# Patient Record
Sex: FEMALE | Race: WHITE | HISPANIC OR LATINO | Employment: UNEMPLOYED | URBAN - METROPOLITAN AREA
[De-identification: names, ages, dates, MRNs, and addresses within clinical notes are randomized per-mention and may not be internally consistent; named-entity substitution may affect disease eponyms.]

---

## 2017-01-18 ENCOUNTER — OFFICE VISIT (OUTPATIENT)
Dept: OBSTETRICS AND GYNECOLOGY | Facility: CLINIC | Age: 32
End: 2017-01-18
Payer: MEDICAID

## 2017-01-18 VITALS
SYSTOLIC BLOOD PRESSURE: 118 MMHG | DIASTOLIC BLOOD PRESSURE: 78 MMHG | WEIGHT: 150.38 LBS | BODY MASS INDEX: 29.52 KG/M2 | HEIGHT: 60 IN

## 2017-01-18 DIAGNOSIS — E28.2 PCOS (POLYCYSTIC OVARIAN SYNDROME): Primary | ICD-10-CM

## 2017-01-18 DIAGNOSIS — N80.9 ENDOMETRIOSIS: ICD-10-CM

## 2017-01-18 DIAGNOSIS — N97.9 FEMALE FERTILITY PROBLEM: ICD-10-CM

## 2017-01-18 DIAGNOSIS — D25.9 UTERINE LEIOMYOMA, UNSPECIFIED LOCATION: ICD-10-CM

## 2017-01-18 PROCEDURE — 99999 PR PBB SHADOW E&M-EST. PATIENT-LVL III: CPT | Mod: PBBFAC,,, | Performed by: OBSTETRICS & GYNECOLOGY

## 2017-01-18 PROCEDURE — 99212 OFFICE O/P EST SF 10 MIN: CPT | Mod: S$PBB,,, | Performed by: OBSTETRICS & GYNECOLOGY

## 2017-01-18 PROCEDURE — 99213 OFFICE O/P EST LOW 20 MIN: CPT | Mod: PBBFAC | Performed by: OBSTETRICS & GYNECOLOGY

## 2017-01-18 RX ORDER — CLOMIPHENE CITRATE 50 MG/1
50 TABLET ORAL DAILY
Qty: 5 TABLET | Refills: 2 | Status: SHIPPED | OUTPATIENT
Start: 2017-01-18 | End: 2017-01-23

## 2017-01-18 NOTE — MR AVS SNAPSHOT
[facility] [place] [facility]  [address], [address]  [place] [address]  Phone: [phone]                  Amara Dasilva   2017 1:30 PM   Office Visit    Description:  Female : 1985   Provider:  Carlos Alberto Miles MD   Department:  [facility] [place] [facility]           Reason for Visit     Results           Diagnoses this Visit        Comments    PCOS (polycystic ovarian syndrome)    -  Primary     Female fertility problem         Uterine leiomyoma, unspecified location         Endometriosis                To Do List           Goals (5 Years of Data)     None      Follow-Up and Disposition     Return in about 6 weeks (around 3/1/2017).       These Medications        Disp Refills Start End    clomiPHENE (CLOMID) 50 mg tablet 5 tablet 2 2017    Take 1 tablet (50 mg total) by mouth once daily. - Oral    [facility] [id number] [place] [address] [address] [place] [address] Ph #: [phone]         [facility] On Call     [facility] On Call Nurse Care Line -  Assistance  Registered nurses in the [facility] On Call Center provide clinical advisement, health education, appointment booking, and other advisory services.  Call for this free service at [phone].             Medications           Message regarding Medications     Verify the changes and/or additions to your medication regime listed below are the same as discussed with your clinician today.  If any of these changes or additions are incorrect, please notify your healthcare provider.        START taking these NEW medications        Refills    clomiPHENE (CLOMID) 50 mg tablet 2    Sig: Take 1 tablet (50 mg total) by mouth once daily.    Class: Normal    Route: Oral           Verify that the below list of medications is an accurate representation of the medications you are currently taking.  If none reported, the list may be blank. If incorrect, please contact your healthcare  provider. Carry this list with you in case of emergency.           Current Medications     clomiPHENE (CLOMID) 50 mg tablet Take 1 tablet (50 mg total) by mouth once daily.    diphenhydrAMINE (BENADRYL) 25 mg capsule Take 1 each (25 mg total) by mouth every 6 (six) hours as needed for Itching.    doxycycline (MONODOX) 100 MG capsule     fluconazole (DIFLUCAN) 150 MG Tab as needed.    fluticasone (FLONASE) 50 mcg/actuation nasal spray as needed.    hydrocortisone 2.5 % cream daily as needed.    ibuprofen (ADVIL,MOTRIN) 600 MG tablet Take 600 mg by mouth 3 (three) times daily.    ibuprofen (ADVIL,MOTRIN) 800 MG tablet     L norgest&E estradiol-E estrad (SEASONIQUE) 0.15 mg-30 mcg (84)/10 mcg (7) 3MPk Take 1 tablet by mouth once daily.    MICROGESTIN 1/20, 21, 1-20 mg-mcg per tablet     montelukast (SINGULAIR) 10 mg tablet     oxycodone-acetaminophen (PERCOCET)  mg per tablet Take 1 tablet by mouth every 4 (four) hours as needed for Pain.    oxycodone-acetaminophen (PERCOCET) 5-325 mg per tablet Take 1 tablet by mouth every 4 (four) hours as needed for Pain.    polyethylene glycol (GLYCOLAX) 17 gram/dose powder as needed.    PROAIR HFA 90 mcg/actuation inhaler     tizanidine (ZANAFLEX) 4 MG tablet Take 4 mg by mouth every 8 (eight) hours.    tretinoin (RETIN-A) 0.05 % cream            Clinical Reference Information           Vital Signs - Last Recorded  Most recent update: 1/18/2017  1:41 PM by Anna Reardon MA    BP Ht Wt BMI       118/78 5' (1.524 m) 68.2 kg (150 lb 5.7 oz) 29.36 kg/m2       Blood Pressure          Most Recent Value    BP  118/78      Allergies as of 1/18/2017     Hydrocodone    Flagyl [Metronidazole Hcl]      Immunizations Administered on Date of Encounter - 1/18/2017     None

## 2017-01-18 NOTE — PROGRESS NOTES
Subjective:      Chief Complaint:    Chief Complaint   Patient presents with    Results       Menstrual History:    OB History      Para Term  AB TAB SAB Ectopic Multiple Living    2 2 2       2          Menarche age: 13     No LMP recorded. Patient is not currently having periods (Reason: Other).           Objective:        History of Present Illness AND  Examination detailed DICTATE:       HISTORY OF PRESENT ILLNESS:  The patient is 31 years of age,  2, para 2.    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  Also, irregular   cycles, galacturia and also now the patient states that she has problem with the   fertility, trying to get pregnant for several years' duration, is not being   happened.  Ultrasound indicated small fibroid tumors and possible ovaries   compatible with polycystic ovarian syndrome; however, hormone assay essentially   normal limit.  Prolactin is normal.  Fasting insulin normal.  FSH, LH,   testosterone normal.  A very high estradiol level, but multiple follicles   probably explain that.  The patient's last menstrual cycle was on 2017.    Since hormone assays are essentially normal range, my impression is possible   mild form of polycystic ovarian syndrome.  Since the patient is trying to get   pregnant and nothing happened, we will initiate Clomid stimulation at the next   cycle and we will follow up after Clomid stimulation with ultrasound and   progesterone levels to see if the patient is ovulating.  We will send a time   table for the patient to start medication and return for the pelvic ultrasound   and progesterone level.    DIAGNOSIS:  Possible milder form of polycystic ovarian syndrome and infertility,   most likely due to the polycystic ovarian syndrome.      EDOUARD  dd: 2017 13:49:48 (CST)  td: 2017 02:34:53 (CST)  Doc ID   #[id number]  Job ID  #[id number]    CC:           Assessment:      Diagnosis: PCOS   FERTILITY   PROBLEM    FIBROIDS       Plan:      Return in 6  weeks

## 2017-08-23 ENCOUNTER — OFFICE VISIT (OUTPATIENT)
Dept: OBSTETRICS AND GYNECOLOGY | Facility: CLINIC | Age: 32
End: 2017-08-23
Payer: MEDICAID

## 2017-08-23 VITALS
WEIGHT: 148.81 LBS | BODY MASS INDEX: 29.22 KG/M2 | SYSTOLIC BLOOD PRESSURE: 115 MMHG | DIASTOLIC BLOOD PRESSURE: 72 MMHG | HEIGHT: 60 IN

## 2017-08-23 DIAGNOSIS — N92.6 IRREGULAR MENSTRUAL CYCLE: ICD-10-CM

## 2017-08-23 DIAGNOSIS — L70.9 ACNE, UNSPECIFIED ACNE TYPE: ICD-10-CM

## 2017-08-23 DIAGNOSIS — E28.2 PCOS (POLYCYSTIC OVARIAN SYNDROME): Primary | ICD-10-CM

## 2017-08-23 PROCEDURE — 3008F BODY MASS INDEX DOCD: CPT | Mod: ,,, | Performed by: OBSTETRICS & GYNECOLOGY

## 2017-08-23 PROCEDURE — 99213 OFFICE O/P EST LOW 20 MIN: CPT | Mod: PBBFAC | Performed by: OBSTETRICS & GYNECOLOGY

## 2017-08-23 PROCEDURE — 99999 PR PBB SHADOW E&M-EST. PATIENT-LVL III: CPT | Mod: PBBFAC,,, | Performed by: OBSTETRICS & GYNECOLOGY

## 2017-08-23 PROCEDURE — 99212 OFFICE O/P EST SF 10 MIN: CPT | Mod: S$PBB,,, | Performed by: OBSTETRICS & GYNECOLOGY

## 2017-08-23 RX ORDER — DROSPIRENONE AND ETHINYL ESTRADIOL 0.02-3(28)
1 KIT ORAL DAILY
Qty: 30 TABLET | Refills: 11 | Status: SHIPPED | OUTPATIENT
Start: 2017-08-23 | End: 2019-02-07 | Stop reason: CLARIF

## 2017-08-23 NOTE — PROGRESS NOTES
Subjective:      Chief Complaint:    Chief Complaint   Patient presents with    Consult       Menstrual History:    OB History      Para Term  AB Living    2 2 2     2    SAB TAB Ectopic Multiple Live Births                       Menarche age: 13     Patient's last menstrual period was 2017.           Objective:        History of Present Illness AND  Examination detailed DICTATE:         The patient is 32 years of age,  2, para 2.  The patient was seen last   visit in January.  Prior to that, in December, the patient was diagnosed with   polycystic ovaries; however, all hormone assay, everything was in normal range.    The patient had one course of Clomid stimulation without any success.    Reviewing the patient's past history and problem list, endometriosis, ovarian   cyst, pelvic pain, at the present complaining of acne eruption on face.    Surgery, exploratory laparotomy and laser laparoscopy for endometriosis.  As   stated, reviewing the patient's records, only abnormality is the multicystic   ovaries and ultrasound and reversal FSH-LH ratio.    We will repeat the thyroid, TSH and serum testosterone level.  We will start the   patient on Liyah to see if we can control the patient's acne and we will see her   back in three months.  We will start the Liyah with the next menstrual cycle.      EDOUARD  dd: 2017 10:29:58 (CDT)  td: 2017 00:52:48 (CDT)  Doc ID   #[id number]  Job ID #[id number]    CC:         Assessment:      Diagnosis: PCOS    ACNE       Plan:      Return in 3  months

## 2017-12-19 ENCOUNTER — OFFICE VISIT (OUTPATIENT)
Dept: OBSTETRICS AND GYNECOLOGY | Facility: CLINIC | Age: 32
End: 2017-12-19
Payer: MEDICAID

## 2017-12-19 VITALS
DIASTOLIC BLOOD PRESSURE: 73 MMHG | SYSTOLIC BLOOD PRESSURE: 120 MMHG | WEIGHT: 159.63 LBS | BODY MASS INDEX: 31.34 KG/M2 | HEIGHT: 60 IN

## 2017-12-19 DIAGNOSIS — N76.1 CHRONIC VAGINITIS: ICD-10-CM

## 2017-12-19 DIAGNOSIS — R10.2 PELVIC PAIN IN FEMALE: ICD-10-CM

## 2017-12-19 DIAGNOSIS — Z30.9 ENCOUNTER FOR CONTRACEPTIVE MANAGEMENT, UNSPECIFIED TYPE: ICD-10-CM

## 2017-12-19 DIAGNOSIS — N80.9 ENDOMETRIOSIS: ICD-10-CM

## 2017-12-19 DIAGNOSIS — D25.9 UTERINE LEIOMYOMA, UNSPECIFIED LOCATION: ICD-10-CM

## 2017-12-19 DIAGNOSIS — Z00.00 ANNUAL PHYSICAL EXAM: Primary | ICD-10-CM

## 2017-12-19 PROCEDURE — 99999 PR PBB SHADOW E&M-EST. PATIENT-LVL III: CPT | Mod: PBBFAC,,, | Performed by: OBSTETRICS & GYNECOLOGY

## 2017-12-19 PROCEDURE — 99215 OFFICE O/P EST HI 40 MIN: CPT | Mod: S$PBB,,, | Performed by: OBSTETRICS & GYNECOLOGY

## 2017-12-19 PROCEDURE — 87591 N.GONORRHOEAE DNA AMP PROB: CPT

## 2017-12-19 PROCEDURE — 99213 OFFICE O/P EST LOW 20 MIN: CPT | Mod: PBBFAC | Performed by: OBSTETRICS & GYNECOLOGY

## 2017-12-19 PROCEDURE — 87660 TRICHOMONAS VAGIN DIR PROBE: CPT

## 2017-12-19 PROCEDURE — 87480 CANDIDA DNA DIR PROBE: CPT

## 2017-12-19 NOTE — PATIENT INSTRUCTIONS

## 2017-12-19 NOTE — PROGRESS NOTES
Subjective:      Chief Complaint:    Chief Complaint   Patient presents with    Gynecologic Exam       Menstrual History:    OB History      Para Term  AB Living    2 2 2     2    SAB TAB Ectopic Multiple Live Births                       Menarche age: 13     No LMP recorded. Patient is not currently having periods (Reason: Other).            Objective:        History of Present Illness AND  Examination detailed DICTATE:       HISTORY OF PRESENT ILLNESS:  The patient is 32 years of age,  2, para 2,   here for annual yearly examination.  The patient's past medical history is   asthma, endometriosis, ovarian cyst, polycystic ovarian syndrome and fibroid.    Ultrasound last year indicated multiple ovarian cysts with a small fibroid   tumor.  Surgery, exploratory laparotomy and multiple laparoscopies because of   endometriosis.  The patient at the present time complaining of discharge and   pruritus.  Also, pelvic pain off and on, not as bad as it was in the past.  The   patient wishes to continue with the Microgestin.    REVIEW OF SYSTEMS:  HEAD, EAR, EYES, NOSE, AND THROAT:  Negative.  CARDIORESPIRATORY:  Negative.  GASTROINTESTINAL:  Negative.  GENITOURINARY:  See present illness.  NEUROMUSCULAR:  No problem.    PHYSICAL EXAMINATION:  GENERAL:  Well-developed, well-nourished, alert, oriented female, not in acute   distress.  VITAL SIGNS:  Blood pressure 120/73, weight 159.  HEAD:  Normocephalic.  EYES:  Reactive.  NECK:  Supple.  Thyroid is not palpable.  No nodes.  CHEST:  Clear.  HEART:  Regular sinus rhythm, no murmur.  BREASTS:  No lumps, masses, discharge, skin changes, retraction, nipple changes.    Axilla negative.  ABDOMEN:  Upper abdomen normal.  Lower abdomen normal.  No guarding, rebound or   tenderness.  Bowel sounds normal.  PELVIC:  External normal.  Vulva normal.  [place], urethral and [place] glands   are negative.  Vagina, heavy mucoid discharge.  Cervix, mild ectropion.   Uterus,   normal size, shape, position.  Both ovaries are palpable, normal size, mobile;   however, some discomfort and pain elicited with the movement of the internal   organs.  Good pelvic support is noted.  The patient also has some dyspareu.  RECTAL:  Negative.  MUSCULOSKELETAL:  Negative.  NEUROLOGIC:  Grossly normal.  SKIN:  Clear.    IMPRESSION:  1.  Vaginal discharge, possible vaginal infection.  2.  Pelvic pain, most likely endometriosis.    Discussed with the patient the future and possible plans; however, we cannot   offer to her.  The patient will have to make the decision on her reproductive   capacity before final treatment is initiated.    PLAN:  Vaginal cultures.  We will decide on treatment depending on her culture.    Continue with Microgestin, multivitamin daily and start calcium and vitamin D   daily and we will see her back within a year.      EDOUARD  dd: 12/19/2017 10:56:54 (CST)  td: 12/20/2017 06:00:23 (CST)  Doc ID   #[id number]  Job ID #[id number]    CC:           Assessment:      Diagnosis:  ANNUAL   EXAM     ENDOMETRIOSIS   PELVIC   PAIN   VAGINITIS      Plan:      Return in 12  months

## 2017-12-20 LAB
C TRACH DNA SPEC QL NAA+PROBE: NOT DETECTED
CANDIDA RRNA VAG QL PROBE: NEGATIVE
G VAGINALIS RRNA GENITAL QL PROBE: NEGATIVE
N GONORRHOEA DNA SPEC QL NAA+PROBE: NOT DETECTED
T VAGINALIS RRNA GENITAL QL PROBE: NEGATIVE

## 2017-12-21 ENCOUNTER — TELEPHONE (OUTPATIENT)
Dept: OBSTETRICS AND GYNECOLOGY | Facility: CLINIC | Age: 32
End: 2017-12-21

## 2017-12-21 NOTE — TELEPHONE ENCOUNTER
----- Message from Carlos Alberto Miles MD sent at 12/21/2017  8:05 AM CST -----  Your vaginal culture is normal.

## 2017-12-21 NOTE — TELEPHONE ENCOUNTER
Notes Recorded by Laura Del Valle MA on 12/21/2017 at 8:43 AM CST  Patient aware of normal result, she is still worried about excessive vaginal discharge.

## 2018-03-04 ENCOUNTER — OFFICE VISIT (OUTPATIENT)
Dept: URGENT CARE | Facility: CLINIC | Age: 33
End: 2018-03-04
Payer: MEDICAID

## 2018-03-04 VITALS
HEART RATE: 110 BPM | SYSTOLIC BLOOD PRESSURE: 126 MMHG | DIASTOLIC BLOOD PRESSURE: 87 MMHG | WEIGHT: 157 LBS | HEIGHT: 60 IN | TEMPERATURE: 99 F | RESPIRATION RATE: 18 BRPM | BODY MASS INDEX: 30.82 KG/M2 | OXYGEN SATURATION: 97 %

## 2018-03-04 DIAGNOSIS — J30.1 ACUTE SEASONAL ALLERGIC RHINITIS DUE TO POLLEN: Primary | ICD-10-CM

## 2018-03-04 DIAGNOSIS — J02.9 ACUTE PHARYNGITIS, UNSPECIFIED ETIOLOGY: ICD-10-CM

## 2018-03-04 DIAGNOSIS — J01.00 ACUTE NON-RECURRENT MAXILLARY SINUSITIS: ICD-10-CM

## 2018-03-04 PROCEDURE — 99213 OFFICE O/P EST LOW 20 MIN: CPT | Mod: S$GLB,,, | Performed by: FAMILY MEDICINE

## 2018-03-04 RX ORDER — PREDNISONE 10 MG/1
TABLET ORAL
Qty: 30 TABLET | Refills: 0 | Status: SHIPPED | OUTPATIENT
Start: 2018-03-04 | End: 2019-02-07 | Stop reason: CLARIF

## 2018-03-04 RX ORDER — PREDNISONE 10 MG/1
10 TABLET ORAL DAILY
Qty: 10 TABLET | Refills: 0 | Status: SHIPPED | OUTPATIENT
Start: 2018-03-04 | End: 2018-03-04 | Stop reason: SDUPTHER

## 2018-03-04 NOTE — PROGRESS NOTES
Subjective:       Patient ID: Amara Dasilva is a 32 y.o. female.    Vitals:  height is 5' (1.524 m) and weight is 71.2 kg (157 lb). Her temperature is 99.3 °F (37.4 °C). Her blood pressure is 126/87 and her pulse is 110. Her respiration is 18 and oxygen saturation is 97%.     Chief Complaint: URI    URI    This is a new problem. The problem has been gradually worsening. The maximum temperature recorded prior to her arrival was 101 - 101.9 F. Associated symptoms include congestion, coughing, headaches, neck pain, rhinorrhea, sinus pain, sneezing and a sore throat. Pertinent negatives include no abdominal pain, chest pain, ear pain, nausea or wheezing. Treatments tried: Augmentin  The treatment provided mild relief.     Review of Systems   Constitution: Negative for chills, fever and malaise/fatigue.   HENT: Positive for congestion, rhinorrhea, sinus pain, sneezing and sore throat. Negative for ear pain and hoarse voice.    Eyes: Negative for discharge and redness.   Cardiovascular: Negative for chest pain, dyspnea on exertion and leg swelling.   Respiratory: Positive for cough and sputum production. Negative for shortness of breath and wheezing.    Musculoskeletal: Positive for myalgias and neck pain.   Gastrointestinal: Negative for abdominal pain and nausea.   Neurological: Positive for headaches.       Objective:      Physical Exam   Constitutional: She is oriented to person, place, and time. She appears well-developed and well-nourished. She is cooperative.  Non-toxic appearance. She does not appear ill. No distress.   HENT:   Head: Normocephalic and atraumatic.   Right Ear: Hearing, tympanic membrane, external ear and ear canal normal.   Left Ear: Hearing, tympanic membrane, external ear and ear canal normal.   Nose: Nose normal. No mucosal edema, rhinorrhea or nasal deformity. No epistaxis. Right sinus exhibits no maxillary sinus tenderness and no frontal sinus tenderness. Left sinus exhibits no maxillary  sinus tenderness and no frontal sinus tenderness.   Mouth/Throat: Uvula is midline, oropharynx is clear and moist and mucous membranes are normal. No trismus in the jaw. Normal dentition. No uvula swelling. No posterior oropharyngeal erythema.   Eyes: Conjunctivae and lids are normal. Right eye exhibits no discharge. Left eye exhibits no discharge. No scleral icterus.   Sclera clear bilat   Neck: Trachea normal, normal range of motion, full passive range of motion without pain and phonation normal. Neck supple.   Cardiovascular: Normal rate, regular rhythm, normal heart sounds, intact distal pulses and normal pulses.    Pulmonary/Chest: Effort normal and breath sounds normal. She has no wheezes.   Abdominal: Soft. Normal appearance and bowel sounds are normal. She exhibits no distension, no pulsatile midline mass and no mass. There is no tenderness.   Musculoskeletal: Normal range of motion. She exhibits no edema or deformity.   Neurological: She is alert and oriented to person, place, and time. She exhibits normal muscle tone. Coordination normal.   Skin: Skin is warm, dry and intact. She is not diaphoretic. No pallor.   Psychiatric: She has a normal mood and affect. Her speech is normal and behavior is normal. Judgment and thought content normal. Cognition and memory are normal.   Nursing note and vitals reviewed.      Assessment:       1. Acute seasonal allergic rhinitis due to pollen    2. Acute pharyngitis, unspecified etiology    3. Acute non-recurrent maxillary sinusitis        Plan:         Acute seasonal allergic rhinitis due to pollen    Acute pharyngitis, unspecified etiology    Acute non-recurrent maxillary sinusitis    Other orders  -     Discontinue: predniSONE (DELTASONE) 10 MG tablet; Take 1 tablet (10 mg total) by mouth once daily.  Dispense: 10 tablet; Refill: 0  -     predniSONE (DELTASONE) 10 MG tablet; Take 2 po bid x 5 days, then one daily x 5-7  days  Dispense: 30 tablet; Refill: 0         Cont. flonase nasal spray once daily  Mucinex D bid  Claritin one daily

## 2019-02-18 PROBLEM — J34.89 OVERDEVELOPMENT OF NASAL BONES: Status: ACTIVE | Noted: 2019-02-18

## 2019-02-18 PROBLEM — J34.3 HYPERTROPHY OF NASAL TURBINATES: Status: ACTIVE | Noted: 2019-02-18

## 2019-02-18 PROBLEM — H69.93 DISORDER OF BOTH EUSTACHIAN TUBES: Status: ACTIVE | Noted: 2019-02-18

## 2019-12-05 ENCOUNTER — OFFICE VISIT (OUTPATIENT)
Dept: URGENT CARE | Facility: CLINIC | Age: 34
End: 2019-12-05
Payer: MEDICAID

## 2019-12-05 VITALS
OXYGEN SATURATION: 98 % | TEMPERATURE: 100 F | RESPIRATION RATE: 18 BRPM | SYSTOLIC BLOOD PRESSURE: 131 MMHG | DIASTOLIC BLOOD PRESSURE: 85 MMHG | HEIGHT: 60 IN | HEART RATE: 103 BPM | WEIGHT: 150 LBS | BODY MASS INDEX: 29.45 KG/M2

## 2019-12-05 DIAGNOSIS — L73.9 FOLLICULITIS OF LEFT AXILLA: Primary | ICD-10-CM

## 2019-12-05 PROCEDURE — 99204 OFFICE O/P NEW MOD 45 MIN: CPT | Mod: S$GLB,,, | Performed by: NURSE PRACTITIONER

## 2019-12-05 PROCEDURE — 99204 PR OFFICE/OUTPT VISIT, NEW, LEVL IV, 45-59 MIN: ICD-10-PCS | Mod: S$GLB,,, | Performed by: NURSE PRACTITIONER

## 2019-12-05 RX ORDER — CEPHALEXIN 500 MG/1
500 CAPSULE ORAL EVERY 12 HOURS
Qty: 14 CAPSULE | Refills: 0 | Status: SHIPPED | OUTPATIENT
Start: 2019-12-05 | End: 2019-12-12

## 2019-12-05 NOTE — PATIENT INSTRUCTIONS
Folliculitis  Folliculitis is an inflammation of a hair follicle. A hair follicle is the little pocket where a hair grows out of the skin. Bacteria normally live on the skin. But sometimes bacteria can get trapped in a follicle and cause infection. This causes a bumpy rash. The area over the follicles is red and raised. It may itch or be painful. The bumps may have fluid (pus) inside. The pus may leak and then form crusts. Sores can spread to other areas of the body. Once it goes away, folliculitis can come back at any time. Severe cases may cause permanent hair loss and scarring.  Folliculitis can happen anywhere on the body where hair grows. It can be caused by rubbing from tight clothing. Ingrown hairs can cause it. Soaking in a hot tub or swimming pool that has bacteria in the water can cause it. It may also occur if a hair follicle is blocked by a bandage.  Sores often go away in a few days with no treatment. In some cases, medicine may be given. A small piece of skin or pus may be taken to find the type of bacteria causing the infection.  Home care  The healthcare provider may prescribe an antibiotic cream or ointment.  Oral antibiotics may also be prescribed. Or you may be told to use an over-the-counter antibiotic cream. Follow all instructions when using any of these medicines.  General care:  · Apply warm, moist compresses to the sores for 20 minutes up to 3 times a day. You can make a compress by soaking a cloth in warm water. Squeeze out excess water.  · Dont cut, poke, or squeeze the sores. This can be painful and spread infection.  · Dont scratch the affected area. Scratching can delay healing.  · Dont shave the areas affected by folliculitis.  · If the sores leak fluid, cover the area with a nonstick gauze bandage. Use as little tape as possible. Carefully discard all soiled bandages.  · Dress in loose cotton clothing.  · Change sheets and blankets if they are soiled by pus. Wash all clothes,  towels, sheets, and cloth diapers in soap and hot water. Do not share clothes, towels, or sheets with other family members.  · Do not soak the sores in bath water. This can spread infection. Instead, keep the area clean by gently washing sores with soap and warm water.  · Wash your hands or use antibacterial gels often to prevent spreading the bacteria.  Follow-up care  Follow up with your healthcare provider, or as advised.  When to seek medical advice  Call your healthcare provider right away if any of these occur:  · Fever of 100.4°F (38°C) or higher  · Spreading of the rash  · Rash does not get better with treatment  · Redness or swelling that gets worse  · Rash becomes more painful  · Foul-smelling fluid leaking from the skin  · Rash improves, but then comes back   Date Last Reviewed: 11/1/2016  © 4014-8181 [employer]. [address] [place] [address], [place] [address]. All rights reserved. This information is not intended as a substitute for professional medical care. Always follow your healthcare professional's instructions.

## 2020-02-11 ENCOUNTER — CLINICAL SUPPORT (OUTPATIENT)
Dept: URGENT CARE | Facility: CLINIC | Age: 35
End: 2020-02-11
Payer: MEDICAID

## 2020-02-11 VITALS
DIASTOLIC BLOOD PRESSURE: 95 MMHG | BODY MASS INDEX: 30.51 KG/M2 | OXYGEN SATURATION: 98 % | HEART RATE: 98 BPM | SYSTOLIC BLOOD PRESSURE: 133 MMHG | WEIGHT: 155.38 LBS | HEIGHT: 60 IN | TEMPERATURE: 100 F

## 2020-02-11 DIAGNOSIS — J10.1 INFLUENZA A: ICD-10-CM

## 2020-02-11 DIAGNOSIS — R50.9 FEVER, UNSPECIFIED FEVER CAUSE: Primary | ICD-10-CM

## 2020-02-11 PROCEDURE — 99214 OFFICE O/P EST MOD 30 MIN: CPT | Mod: S$GLB,,, | Performed by: NURSE PRACTITIONER

## 2020-02-11 PROCEDURE — 99214 PR OFFICE/OUTPT VISIT, EST, LEVL IV, 30-39 MIN: ICD-10-PCS | Mod: S$GLB,,, | Performed by: NURSE PRACTITIONER

## 2020-02-11 RX ORDER — CETIRIZINE HYDROCHLORIDE 10 MG/1
10 TABLET ORAL DAILY
Qty: 30 TABLET | Refills: 0 | Status: SHIPPED | OUTPATIENT
Start: 2020-02-11 | End: 2023-06-22 | Stop reason: SDUPTHER

## 2020-02-11 RX ORDER — OSELTAMIVIR PHOSPHATE 75 MG/1
75 CAPSULE ORAL 2 TIMES DAILY
Qty: 10 CAPSULE | Refills: 0 | Status: SHIPPED | OUTPATIENT
Start: 2020-02-11 | End: 2020-02-16

## 2020-02-11 RX ORDER — FLUTICASONE PROPIONATE 50 MCG
2 SPRAY, SUSPENSION (ML) NASAL DAILY
Qty: 15.8 ML | Refills: 0 | Status: SHIPPED | OUTPATIENT
Start: 2020-02-11 | End: 2023-06-06

## 2020-02-11 RX ORDER — ACETAMINOPHEN 500 MG
1000 TABLET ORAL
Status: COMPLETED | OUTPATIENT
Start: 2020-02-11 | End: 2020-02-11

## 2020-02-11 RX ADMIN — Medication 1000 MG: at 05:02

## 2020-02-11 NOTE — PATIENT INSTRUCTIONS
The Flu (Influenza)     The virus that causes the flu spreads through the air in droplets when someone who has the flu coughs, sneezes, laughs, or talks.   The flu (influenza) is an infection that affects your respiratory tract. This tract is made up of your mouth, nose, and lungs, and the passages between them. Unlike a cold, the flu can make you very ill. And it can lead to pneumonia, a serious lung infection. The flu can have serious complications and even cause death.  Who is at risk for the flu?  Anyone can get the flu. But you are more likely to become infected if you:  · Have a weakened immune system  · Work in a healthcare setting where you may be exposed to flu germs  · Live or work with someone who has the flu  · Havent had an annual flu shot  How does the flu spread?  The flu is caused by a virus. The virus spreads through the air in droplets when someone who has the flu coughs, sneezes, laughs, or talks. You can become infected when you inhale these viruses directly. You can also become infected when you touch a surface on which the droplets have landed and then transfer the germs to your eyes, nose, or mouth. Touching used tissues, or sharing utensils, drinking glasses, or a toothbrush from an infected person can expose you to flu viruses, too.  What are the symptoms of the flu?  Flu symptoms tend to come on quickly and may last a few days to a few weeks. They include:  · Fever usually higher than 100.4°F  (38°C) and chills  · Sore throat and headache  · Dry cough  · Runny nose  · Tiredness and weakness  · Muscle aches  Who is at risk for flu complications?  For some people, the flu can be very serious. The risk for complications is greater for:  · Children younger than age 5  · Adults ages 65 and older  · People with a chronic illness such as diabetes or heart, kidney, or lung disease  · People who live in a nursing home or long-term care facility   How is the flu treated?  The flu usually gets  better after 7 days or so. In some cases, your healthcare provider may prescribe an antiviral medicine. This may help you get well a little sooner. For the medicine to help, you need to take it as soon as possible (ideally within 48 hours) after your symptoms start. If you develop pneumonia or other serious illness, you may need to stay in the hospital.  Easing flu symptoms  · Drink lots of fluids such as water, juice, and warm soup. A good rule is to drink enough so that you urinate your normal amount.  · Get plenty of rest.  · Ask your healthcare provider what to take for fever and pain.  · Call your provider if your fever is 100.4°F (38°C) or higher, or you become dizzy, lightheaded, or short of breath.  Taking steps to protect others  · Wash your hands often, especially after coughing or sneezing. Or clean your hands with an alcohol-based hand  containing at least 60% alcohol.  · Cough or sneeze into a tissue. Then throw the tissue away and wash your hands. If you dont have a tissue, cough and sneeze into your elbow.  · Stay home until at least 24 hours after you no longer have a fever or chills. Be sure the fever isnt being hidden by fever-reducing medicine.  · Dont share food, utensils, drinking glasses, or a toothbrush with others.  · Ask your healthcare provider if others in your household should get antiviral medicine to help them avoid infection.  How can the flu be prevented?  · One of the best ways to avoid the flu is to get a flu vaccine each year. The virus that causes the flu changes from year to year. For that reason, healthcare providers recommend getting the flu vaccine each year, as soon as it's available in your area. The vaccine is given as a shot. Your healthcare provider can tell you which vaccine is right for you. A nasal spray is also available but is not recommended for the 6893-2804 flu season. The CDC says this is because the nasal spray did not seem to protect against the flu  over the last several flu seasons. In the past, it was meant for people ages 2 to 49.  · Wash your hands often. Frequent handwashing is a proven way to help prevent infection.  · Carry an alcohol-based hand gel containing at least 60% alcohol. Use it when you can't use soap and water. Then wash your hands as soon as you can.  · Avoid touching your eyes, nose, and mouth.  · At home and work, clean phones, computer keyboards, and toys often with disinfectant wipes.  · If possible, avoid close contact with others who have the flu or symptoms of the flu.  Handwashing tips  Handwashing is one of the best ways to prevent many common infections. If you are caring for or visiting someone with the flu, wash your hands each time you enter and leave the room. Follow these steps:  · Use warm water and plenty of soap. Rub your hands together well.  · Clean the whole hand, including under your nails, between your fingers, and up the wrists.  · Wash for at least 15 seconds.  · Rinse, letting the water run down your fingers, not up your wrists.  · Dry your hands well. Use a paper towel to turn off the faucet and open the door.  Using alcohol-based hand   Alcohol-based hand  are also a good choice. Use them when you can't use soap and water. Follow these steps:  · Squeeze about a tablespoon of gel into the palm of one hand.  · Rub your hands together briskly, cleaning the backs of your hands, the palms, between your fingers, and up the wrists.  · Rub until the gel is gone and your hands are completely dry.  Preventing the flu in healthcare settings  The flu is a special concern for people in hospitals and long-term care facilities. To help prevent the spread of flu, many hospitals and nursing homes take these steps:  · Healthcare providers wash their hands or use an alcohol-based hand  before and after treating each patient.  · People with the flu have private rooms and bathrooms or share a room with someone  with the same infection.  · People who are at high risk for the flu but don't have it are encouraged to get the flu and pneumonia vaccines.  · All healthcare workers are encouraged or required to get flu shots.   Date Last Reviewed: 12/1/2016  © 7196-5274 [employer]. [address] [place] [address]. All rights reserved. This information is not intended as a substitute for professional medical care. Always follow your healthcare professional's instructions.

## 2020-02-11 NOTE — LETTER
February 11, 2020      [facility]  [address]  [facility] [address]  Phone: [phone]       Patient: Amara Dasilva   YOB: 1985  Date of Visit: 02/11/2020    To Whom It May Concern:    Gwyn Dasilva  was at [facility] on 02/11/2020. She may return to work/school on 2/17/20 with no restrictions. If you have any questions or concerns, or if I can be of further assistance, please do not hesitate to contact me.    Sincerely,    Muriel Aquino, NP

## 2020-02-11 NOTE — PROGRESS NOTES
Subjective:       Patient ID: Amara Dasilva is a 34 y.o. female.    Vitals:  height is 5' (1.524 m) and weight is 70.5 kg (155 lb 6.4 oz). Her oral temperature is 100.2 °F (37.9 °C). Her blood pressure is 133/95 (abnormal) and her pulse is 98. Her oxygen saturation is 98%.     Chief Complaint: Cough    Patient complains of fever, cough, sinus congestion, and body aches that began today. Reports here children were seen here in clinic last night and diagnosed with influenza A.     Cough   This is a new problem. The current episode started in the past 7 days. The problem has been gradually worsening. The cough is non-productive. Associated symptoms include chest pain, chills, ear pain, headaches, myalgias, nasal congestion, rhinorrhea and sweats. Pertinent negatives include no fever, rash, sore throat or shortness of breath. Treatments tried: Tylenol, NSAIDS. The treatment provided no relief.       Constitution: Positive for chills. Negative for fatigue and fever.   HENT: Positive for ear pain. Negative for congestion and sore throat.    Neck: Negative for painful lymph nodes.   Cardiovascular: Positive for chest pain. Negative for leg swelling.   Eyes: Negative for double vision and blurred vision.   Respiratory: Positive for cough. Negative for shortness of breath.    Gastrointestinal: Negative for abdominal pain, nausea, vomiting and diarrhea.   Genitourinary: Negative for dysuria, frequency, urgency and history of kidney stones.   Musculoskeletal: Positive for muscle ache. Negative for joint pain, joint swelling and muscle cramps.   Skin: Negative for color change, pale, rash and bruising.   Allergic/Immunologic: Negative for seasonal allergies.   Neurological: Positive for headaches. Negative for dizziness, history of vertigo, light-headedness and passing out.   Hematologic/Lymphatic: Negative for swollen lymph nodes.   Psychiatric/Behavioral: Negative for nervous/anxious, sleep disturbance and depression.  The patient is not nervous/anxious.        Objective:      Physical Exam   Constitutional: She is oriented to person, place, and time. Vital signs are normal. She appears well-developed and well-nourished. She is cooperative.  Non-toxic appearance. She does not have a sickly appearance. She does not appear ill. No distress.   HENT:   Head: Normocephalic and atraumatic.   Right Ear: Hearing, tympanic membrane, external ear and ear canal normal.   Left Ear: Hearing, tympanic membrane, external ear and ear canal normal.   Nose: Nose normal. No mucosal edema, rhinorrhea or nasal deformity. No epistaxis. Right sinus exhibits no maxillary sinus tenderness and no frontal sinus tenderness. Left sinus exhibits no maxillary sinus tenderness and no frontal sinus tenderness.   Mouth/Throat: Uvula is midline, oropharynx is clear and moist and mucous membranes are normal. No trismus in the jaw. Normal dentition. No uvula swelling. No posterior oropharyngeal erythema.   Eyes: Conjunctivae and lids are normal. Right eye exhibits no discharge. Left eye exhibits no discharge. No scleral icterus.   Neck: Trachea normal, normal range of motion, full passive range of motion without pain and phonation normal. Neck supple.   Cardiovascular: Normal rate, regular rhythm, normal heart sounds, intact distal pulses and normal pulses.   Pulmonary/Chest: Effort normal and breath sounds normal. No respiratory distress.   Abdominal: Soft. Normal appearance and bowel sounds are normal. She exhibits no distension, no pulsatile midline mass and no mass. There is no tenderness.   Musculoskeletal: Normal range of motion. She exhibits no edema or deformity.   Neurological: She is alert and oriented to person, place, and time. She exhibits normal muscle tone. Coordination normal. GCS eye subscore is 4. GCS verbal subscore is 5. GCS motor subscore is 6.   Skin: Skin is warm, dry, intact, not diaphoretic and not pale.   Psychiatric: She has a normal mood  and affect. Her speech is normal and behavior is normal. Judgment and thought content normal. Cognition and memory are normal.   Nursing note and vitals reviewed.        Assessment:       1. Fever, unspecified fever cause    2. Influenza A        Plan:       Influenza negative.   Will treat as influenza based on history, physical exam, exposure to and prevalence of influenza within the community. Advised to increase fluids, rest and take tylenol or motrin for fever. Follow up with PCP.     Fever, unspecified fever cause    Influenza A    Other orders  -     acetaminophen tablet 1,000 mg  -     cetirizine (ZYRTEC) 10 MG tablet; Take 1 tablet (10 mg total) by mouth once daily.  Dispense: 30 tablet; Refill: 0  -     fluticasone propionate (FLONASE) 50 mcg/actuation nasal spray; 2 sprays (100 mcg total) by Each Nostril route once daily.  Dispense: 15.8 mL; Refill: 0  -     dexchlorphen-phenylephrine-DM (POLYTUSSIN DM) 1-5-10 mg/5 mL Syrp; Take 5 mLs by mouth every 4 (four) hours as needed.  Dispense: 120 mL; Refill: 0  -     oseltamivir (TAMIFLU) 75 MG capsule; Take 1 capsule (75 mg total) by mouth 2 (two) times daily. for 5 days  Dispense: 10 capsule; Refill: 0

## 2020-03-09 ENCOUNTER — OFFICE VISIT (OUTPATIENT)
Dept: URGENT CARE | Facility: CLINIC | Age: 35
End: 2020-03-09
Payer: MEDICAID

## 2020-03-09 VITALS
DIASTOLIC BLOOD PRESSURE: 88 MMHG | TEMPERATURE: 100 F | BODY MASS INDEX: 30.27 KG/M2 | WEIGHT: 155 LBS | OXYGEN SATURATION: 100 % | SYSTOLIC BLOOD PRESSURE: 133 MMHG | HEART RATE: 84 BPM | RESPIRATION RATE: 16 BRPM

## 2020-03-09 DIAGNOSIS — H65.93 BILATERAL OTITIS MEDIA WITH EFFUSION: Primary | ICD-10-CM

## 2020-03-09 PROCEDURE — 99214 OFFICE O/P EST MOD 30 MIN: CPT | Mod: S$GLB,,, | Performed by: NURSE PRACTITIONER

## 2020-03-09 PROCEDURE — 99214 PR OFFICE/OUTPT VISIT, EST, LEVL IV, 30-39 MIN: ICD-10-PCS | Mod: S$GLB,,, | Performed by: NURSE PRACTITIONER

## 2020-03-09 RX ORDER — PREDNISONE 20 MG/1
20 TABLET ORAL 2 TIMES DAILY
Qty: 10 TABLET | Refills: 0 | Status: SHIPPED | OUTPATIENT
Start: 2020-03-09 | End: 2020-03-14

## 2020-03-09 RX ORDER — CEFDINIR 300 MG/1
300 CAPSULE ORAL 2 TIMES DAILY
Qty: 20 CAPSULE | Refills: 0 | Status: SHIPPED | OUTPATIENT
Start: 2020-03-09 | End: 2020-03-19

## 2020-03-09 NOTE — PROGRESS NOTES
Subjective: right ear pain x 1 week       Patient ID: Amara Dasilva is a 34 y.o. female.    Vitals:  weight is 70.3 kg (155 lb). Her temperature is 99.8 °F (37.7 °C). Her blood pressure is 133/88 and her pulse is 84. Her respiration is 16 and oxygen saturation is 100%.     Chief Complaint: Otalgia (right ear pain x 1 week)    Otalgia    There is pain in the right ear. This is a new problem. The current episode started in the past 7 days. Pertinent negatives include no coughing, diarrhea, headaches, rash, sore throat or vomiting.       Constitution: Negative for chills, fatigue and fever.   HENT: Positive for ear pain. Negative for congestion and sore throat.    Neck: Negative for painful lymph nodes.   Cardiovascular: Negative for chest pain and leg swelling.   Eyes: Negative for double vision and blurred vision.   Respiratory: Negative for cough and shortness of breath.    Gastrointestinal: Negative for nausea, vomiting and diarrhea.   Genitourinary: Negative for dysuria, frequency, urgency and history of kidney stones.   Musculoskeletal: Negative for joint pain, joint swelling, muscle cramps and muscle ache.   Skin: Negative for color change, pale, rash and bruising.   Allergic/Immunologic: Negative for seasonal allergies.   Neurological: Negative for dizziness, history of vertigo, light-headedness, passing out and headaches.   Hematologic/Lymphatic: Negative for swollen lymph nodes.   Psychiatric/Behavioral: Negative for nervous/anxious, sleep disturbance and depression. The patient is not nervous/anxious.        Objective:      Physical Exam   Constitutional: She is oriented to person, place, and time. She appears well-developed and well-nourished. She is cooperative.  Non-toxic appearance. She does not appear ill. No distress.   HENT:   Head: Normocephalic and atraumatic.   Right Ear: Hearing, external ear and ear canal normal. Tympanic membrane is bulging. A middle ear effusion is present.   Left Ear:  Hearing, external ear and ear canal normal. Tympanic membrane is bulging. A middle ear effusion is present.   Nose: Nose normal. No mucosal edema, rhinorrhea or nasal deformity. No epistaxis. Right sinus exhibits no maxillary sinus tenderness and no frontal sinus tenderness. Left sinus exhibits no maxillary sinus tenderness and no frontal sinus tenderness.   Mouth/Throat: Uvula is midline, oropharynx is clear and moist and mucous membranes are normal. No trismus in the jaw. Normal dentition. No uvula swelling. No posterior oropharyngeal erythema.   Eyes: Conjunctivae and lids are normal. Right eye exhibits no discharge. Left eye exhibits no discharge. No scleral icterus.   Neck: Trachea normal, normal range of motion, full passive range of motion without pain and phonation normal. Neck supple.   Cardiovascular: Normal rate, regular rhythm, normal heart sounds, intact distal pulses and normal pulses.   Pulmonary/Chest: Effort normal and breath sounds normal. No respiratory distress.   Abdominal: Soft. Normal appearance and bowel sounds are normal. She exhibits no distension, no pulsatile midline mass and no mass. There is no tenderness.   Musculoskeletal: Normal range of motion. She exhibits no edema or deformity.   Neurological: She is alert and oriented to person, place, and time. She exhibits normal muscle tone. Coordination normal.   Skin: Skin is warm, dry, intact, not diaphoretic and not pale.   Psychiatric: She has a normal mood and affect. Her speech is normal and behavior is normal. Judgment and thought content normal. Cognition and memory are normal.   Nursing note and vitals reviewed.        Assessment:       1. Bilateral otitis media with effusion        Plan:         Bilateral otitis media with effusion    Other orders  -     cefdinir (OMNICEF) 300 MG capsule; Take 1 capsule (300 mg total) by mouth 2 (two) times daily. for 10 days  Dispense: 20 capsule; Refill: 0  -     predniSONE (DELTASONE) 20 MG  tablet; Take 1 tablet (20 mg total) by mouth 2 (two) times daily. for 5 days  Dispense: 10 tablet; Refill: 0

## 2020-03-09 NOTE — PATIENT INSTRUCTIONS
Common Middle Ear Problems    Your middle ear may have been injured or infected recently. Over time, certain growths or bone disease can also harm the middle ear. Left untreated, middle ear problems often lead to lifelong hearing loss. There are two types of hearing loss: conductive and sensorineural. One or both kinds can occur. Injury, infection, certain growths, or bone disease can cause your symptoms. A ruptured eardrum or a long-lasting (chronic) ear infection may be painful and decrease hearing.  Symptoms  · Hearing loss in one or both ears  · Fluid, often smelly, draining from the ear  · Pain, pressure, or discomfort in the ear  · Ringing in the ear  Conductive and sensorineural hearing loss  Sound waves may be disrupted before they reach the inner ear. If this happens, conductive hearing loss may occur. The ear canal can be blocked by wax, infection, a tumor, or a foreign object. The eardrum can be injured or infected. Abnormal bone growth, infection, or tumors in the middle ear can block sound waves.  Sound waves may not be processed correctly in the inner ear. If this happens, sensorineural hearing loss may occur. Permanent hearing loss is most commonly associated with sensorineural problems.  The tests and evaluations used to diagnose what type of hearing problem you have will depend on your symptoms.   Date Last Reviewed: 10/1/2016  © 2998-4331 [employer]. [address] [place] [address], [place] [address]. All rights reserved. This information is not intended as a substitute for professional medical care. Always follow your healthcare professional's instructions.

## 2020-07-08 ENCOUNTER — OFFICE VISIT (OUTPATIENT)
Dept: URGENT CARE | Facility: CLINIC | Age: 35
End: 2020-07-08
Payer: MEDICAID

## 2020-07-08 VITALS
HEIGHT: 60 IN | BODY MASS INDEX: 31.22 KG/M2 | HEART RATE: 76 BPM | DIASTOLIC BLOOD PRESSURE: 76 MMHG | WEIGHT: 159 LBS | RESPIRATION RATE: 18 BRPM | SYSTOLIC BLOOD PRESSURE: 125 MMHG | OXYGEN SATURATION: 99 % | TEMPERATURE: 98 F

## 2020-07-08 DIAGNOSIS — N30.01 ACUTE CYSTITIS WITH HEMATURIA: Primary | ICD-10-CM

## 2020-07-08 LAB
BILIRUB UR QL STRIP: NEGATIVE
GLUCOSE UR QL STRIP: NEGATIVE
KETONES UR QL STRIP: NEGATIVE
LEUKOCYTE ESTERASE UR QL STRIP: NEGATIVE
PH, POC UA: 6
POC BLOOD, URINE: NEGATIVE
POC NITRATES, URINE: NEGATIVE
PROT UR QL STRIP: NEGATIVE
SP GR UR STRIP: 1.02 (ref 1–1.03)
UROBILINOGEN UR STRIP-ACNC: NORMAL (ref 0.1–1.1)

## 2020-07-08 PROCEDURE — 99214 PR OFFICE/OUTPT VISIT, EST, LEVL IV, 30-39 MIN: ICD-10-PCS | Mod: 25,S$GLB,, | Performed by: NURSE PRACTITIONER

## 2020-07-08 PROCEDURE — 81003 POCT URINALYSIS, DIPSTICK, AUTOMATED, W/O SCOPE: ICD-10-PCS | Mod: QW,S$GLB,, | Performed by: NURSE PRACTITIONER

## 2020-07-08 PROCEDURE — 99214 OFFICE O/P EST MOD 30 MIN: CPT | Mod: 25,S$GLB,, | Performed by: NURSE PRACTITIONER

## 2020-07-08 PROCEDURE — 81003 URINALYSIS AUTO W/O SCOPE: CPT | Mod: QW,S$GLB,, | Performed by: NURSE PRACTITIONER

## 2020-07-08 RX ORDER — CEPHALEXIN 500 MG/1
500 CAPSULE ORAL EVERY 8 HOURS
Qty: 30 CAPSULE | Refills: 0 | Status: SHIPPED | OUTPATIENT
Start: 2020-07-08 | End: 2020-07-18

## 2020-07-08 NOTE — PROGRESS NOTES
Subjective:       Patient ID: Amara Dasilva is a 34 y.o. female.    Vitals:  height is 5' (1.524 m) and weight is 72.1 kg (159 lb). Her oral temperature is 98.2 °F (36.8 °C). Her blood pressure is 125/76 and her pulse is 76. Her respiration is 18 and oxygen saturation is 99%.     Chief Complaint: Urinary Tract Infection    Urinary Tract Infection   This is a new problem. The current episode started gradual onset. The problem has been gradually worsening. The quality of the pain is described as aching and burning. There has been no fever. Associated symptoms include frequency and hematuria. Pertinent negatives include no chills, nausea, urgency, vomiting or rash. She has tried nothing for the symptoms. The treatment provided mild relief.       Constitution: Negative for chills and fever.   Neck: Negative for painful lymph nodes.   Gastrointestinal: Negative for abdominal pain, nausea and vomiting.   Genitourinary: Positive for frequency and hematuria. Negative for dysuria, urgency, urine decreased, history of kidney stones, painful menstruation, irregular menstruation, missed menses, heavy menstrual bleeding, ovarian cysts, genital trauma, vaginal pain, vaginal discharge, vaginal bleeding, vaginal odor, painful intercourse, genital sore, painful ejaculation and pelvic pain.   Musculoskeletal: Negative for back pain.   Skin: Negative for rash and lesion.   Hematologic/Lymphatic: Negative for swollen lymph nodes.       Objective:      Physical Exam   Constitutional: She is oriented to person, place, and time. She appears well-developed.   HENT:   Head: Normocephalic and atraumatic.   Right Ear: External ear normal.   Left Ear: External ear normal.   Nose: Nose normal. No nasal deformity. No epistaxis.   Mouth/Throat: Oropharynx is clear and moist and mucous membranes are normal.   Eyes: Lids are normal.   Neck: Trachea normal, normal range of motion and phonation normal. Neck supple.   Cardiovascular: Normal  pulses.   Pulmonary/Chest: Effort normal.   Abdominal: Soft. Normal appearance and bowel sounds are normal. She exhibits no distension. There is no abdominal tenderness.   Neurological: She is alert and oriented to person, place, and time.   Skin: Skin is warm, dry and intact.   Psychiatric: Her speech is normal and behavior is normal. Mood, judgment and thought content normal.   Nursing note and vitals reviewed.        Assessment:       1. Acute cystitis with hematuria        Plan:       Discussed medication options with patient and option to wait for culture. Given symptoms, exam, history of pyelonephritis and documented blood in urine at OBGYN appointment x2, will treat for possible pyelonephritis   Acute cystitis with hematuria  -     POCT Urinalysis, Dipstick, Automated, W/O Scope  -     Culture, Urine  -     cephALEXin (KEFLEX) 500 MG capsule; Take 1 capsule (500 mg total) by mouth every 8 (eight) hours. for 10 days  Dispense: 30 capsule; Refill: 0

## 2020-07-13 LAB
BACTERIA UR CULT: NO GROWTH
BACTERIA UR CULT: NORMAL

## 2020-07-14 ENCOUNTER — TELEPHONE (OUTPATIENT)
Dept: URGENT CARE | Facility: CLINIC | Age: 35
End: 2020-07-14

## 2020-07-14 NOTE — TELEPHONE ENCOUNTER
----- Message from Livier Akers NP sent at 7/14/2020 11:50 AM CDT -----  There was no growth on urine culture. If patient continues to have symptoms she should follow up with PCP or obgyn

## 2020-08-12 ENCOUNTER — OFFICE VISIT (OUTPATIENT)
Dept: URGENT CARE | Facility: CLINIC | Age: 35
End: 2020-08-12
Payer: MEDICAID

## 2020-08-12 VITALS
SYSTOLIC BLOOD PRESSURE: 135 MMHG | HEIGHT: 60 IN | BODY MASS INDEX: 31.02 KG/M2 | OXYGEN SATURATION: 100 % | DIASTOLIC BLOOD PRESSURE: 90 MMHG | HEART RATE: 85 BPM | TEMPERATURE: 98 F | RESPIRATION RATE: 17 BRPM | WEIGHT: 158 LBS

## 2020-08-12 DIAGNOSIS — R11.0 NAUSEA: ICD-10-CM

## 2020-08-12 DIAGNOSIS — R50.9 FEVER, UNSPECIFIED FEVER CAUSE: ICD-10-CM

## 2020-08-12 DIAGNOSIS — H60.502 ACUTE OTITIS EXTERNA OF LEFT EAR, UNSPECIFIED TYPE: Primary | ICD-10-CM

## 2020-08-12 DIAGNOSIS — R50.9 FEVER: ICD-10-CM

## 2020-08-12 LAB
B-HCG UR QL: NEGATIVE
CTP QC/QA: YES

## 2020-08-12 PROCEDURE — 99214 OFFICE O/P EST MOD 30 MIN: CPT | Mod: S$GLB,,, | Performed by: NURSE PRACTITIONER

## 2020-08-12 PROCEDURE — 99214 PR OFFICE/OUTPT VISIT, EST, LEVL IV, 30-39 MIN: ICD-10-PCS | Mod: S$GLB,,, | Performed by: NURSE PRACTITIONER

## 2020-08-12 RX ORDER — ONDANSETRON 4 MG/1
4 TABLET, ORALLY DISINTEGRATING ORAL EVERY 6 HOURS PRN
Qty: 30 TABLET | Refills: 0 | OUTPATIENT
Start: 2020-08-12 | End: 2021-09-09

## 2020-08-12 RX ORDER — PROMETHAZINE HYDROCHLORIDE 25 MG/1
25 TABLET ORAL EVERY 4 HOURS PRN
Qty: 30 TABLET | Refills: 0 | Status: SHIPPED | OUTPATIENT
Start: 2020-08-12 | End: 2021-09-09 | Stop reason: ALTCHOICE

## 2020-08-12 RX ORDER — NEOMYCIN SULFATE, POLYMYXIN B SULFATE, HYDROCORTISONE 3.5; 10000; 1 MG/ML; [USP'U]/ML; MG/ML
4 SOLUTION/ DROPS AURICULAR (OTIC) 3 TIMES DAILY
Qty: 10 ML | Refills: 0 | Status: SHIPPED | OUTPATIENT
Start: 2020-08-12 | End: 2020-08-22

## 2020-08-12 NOTE — PROGRESS NOTES
Subjective:       Patient ID: Amara Dasilva is a 35 y.o. female.    Vitals:  height is 5' (1.524 m) and weight is 71.7 kg (158 lb). Her temperature is 98.4 °F (36.9 °C). Her blood pressure is 135/90 (abnormal) and her pulse is 85. Her respiration is 17 and oxygen saturation is 100%.     Chief Complaint: Nausea    Nausea  Episode onset: 2 days  Associated symptoms include diaphoresis, a fever and nausea. Pertinent negatives include no abdominal pain, chest pain, chills or vomiting. Associated symptoms comments: Headache, ear pain . She has tried acetaminophen for the symptoms. The treatment provided no relief.       Constitution: Positive for sweating and fever. Negative for appetite change and chills.   HENT: Positive for ear pain and ear discharge. Negative for trouble swallowing.    Cardiovascular: Negative for chest pain.   Respiratory: Negative for shortness of breath.    Gastrointestinal: Positive for nausea. Negative for abdominal trauma, abdominal pain, abdominal bloating, history of abdominal surgery, vomiting, constipation, diarrhea, dark colored stools and heartburn.   Genitourinary: Negative for dysuria, missed menses and pelvic pain.   Musculoskeletal: Negative for back pain.       Objective:      Physical Exam   Constitutional: She is oriented to person, place, and time. She appears well-developed.   HENT:   Head: Normocephalic and atraumatic.   Ears:   Right Ear: External ear normal.   Left Ear: External ear normal. There is drainage, swelling and tenderness.   Nose: Nose normal.   Mouth/Throat: Mucous membranes are normal.   Eyes: Conjunctivae and lids are normal.   Neck: Trachea normal and full passive range of motion without pain. Neck supple.   Cardiovascular: Normal rate, regular rhythm and normal heart sounds.   Pulmonary/Chest: Effort normal and breath sounds normal. No respiratory distress.   Abdominal: Soft. Normal appearance and bowel sounds are normal. She exhibits no distension, no  abdominal bruit, no pulsatile midline mass and no mass. There is no abdominal tenderness.   Musculoskeletal: Normal range of motion.   Neurological: She is alert and oriented to person, place, and time. She has normal strength.   Skin: Skin is warm, dry, intact, not diaphoretic and not pale. Psychiatric: Her speech is normal and behavior is normal. Judgment and thought content normal.   Nursing note and vitals reviewed.        Assessment:       1. Acute otitis externa of left ear, unspecified type    2. Nausea    3. Fever, unspecified fever cause    4. Fever        Plan:         Acute otitis externa of left ear, unspecified type    Nausea  -     POCT urine pregnancy  -     COVID-19 Routine Screening; Future; Expected date: 08/12/2020    Fever, unspecified fever cause    Fever  -     COVID-19 Routine Screening; Future; Expected date: 08/12/2020    Other orders  -     Cancel: COVID-19 Routine Screening  -     neomycin-polymyxin-hydrocortisone (CORTISPORIN) otic solution; Place 4 drops into both ears 3 (three) times daily. for 10 days  Dispense: 10 mL; Refill: 0  -     ondansetron (ZOFRAN-ODT) 4 MG TbDL; Take 1 tablet (4 mg total) by mouth every 6 (six) hours as needed.  Dispense: 30 tablet; Refill: 0  -     promethazine (PHENERGAN) 25 MG tablet; Take 1 tablet (25 mg total) by mouth every 4 (four) hours as needed for Nausea.  Dispense: 30 tablet; Refill: 0

## 2020-08-13 ENCOUNTER — LAB VISIT (OUTPATIENT)
Dept: PRIMARY CARE CLINIC | Facility: CLINIC | Age: 35
End: 2020-08-13
Payer: MEDICAID

## 2020-08-13 DIAGNOSIS — R50.9 FEVER: ICD-10-CM

## 2020-08-13 DIAGNOSIS — R11.0 NAUSEA: ICD-10-CM

## 2020-08-13 PROCEDURE — U0003 INFECTIOUS AGENT DETECTION BY NUCLEIC ACID (DNA OR RNA); SEVERE ACUTE RESPIRATORY SYNDROME CORONAVIRUS 2 (SARS-COV-2) (CORONAVIRUS DISEASE [COVID-19]), AMPLIFIED PROBE TECHNIQUE, MAKING USE OF HIGH THROUGHPUT TECHNOLOGIES AS DESCRIBED BY CMS-2020-01-R: HCPCS

## 2020-08-13 NOTE — PATIENT INSTRUCTIONS
External Ear Infection (Adult)    External otitis (also called swimmers ear) is an infection in the ear canal. It is often caused by bacteria or fungus. It can occur a few days after water gets trapped in the ear canal (from swimming or bathing). It can also occur after cleaning too deeply in the ear canal with a cotton swab or other object. Sometimes, hair care products get into the ear canal and cause this problem.  Symptoms can include pain, fever, itching, redness, drainage, or swelling of the ear canal. Temporary hearing loss may also occur.  Home care  · Do not try to clean the ear canal. This can push pus and bacteria deeper into the canal.  · Use prescribed ear drops as directed. These help reduce swelling and fight the infection. If an ear wick was placed in the ear canal, apply drops right onto the end of the wick. The wick will draw the medication into the ear canal even if it is swollen closed.  · A cotton ball may be loosely placed in the outer ear to absorb any drainage.  · You may use acetaminophen or ibuprofen to control pain, unless another medication was prescribed. Note: If you have chronic liver or kidney disease or ever had a stomach ulcer or GI bleeding, talk to your health care provider before taking any of these medications.  · Do not allow water to get into your ear when bathing. Also, avoid swimming until the infection has cleared.  Prevention  · Keep your ears dry. This helps lower the risk of infection. Dry your ears with a towel or hair dryer after getting wet. Also, use ear plugs when swimming.  · Do not stick any objects in the ear to remove wax.  · If you feel water trapped in your ear, use ear drops right away. You can get these drops over the counter at most drugstores. They work by removing water from the ear canal.  Follow-up care  Follow up with your health care provider in one week, or as advised.  When to seek medical advice  Call your health care provider right away if  "any of these occur:  · Ear pain becomes worse or doesnt improve after 3 days of treatment  · Redness or swelling of the outer ear occurs or gets worse  · Headache  · Painful or stiff neck  · Drowsiness or confusion  · Fever of 100.4ºF (38ºC) or higher, or as directed by your health care provider  · Seizure  Date Last Reviewed: 3/22/2015  © 1189-7326 [employer]. [address] [place] [address], [place] [address]. All rights reserved. This information is not intended as a substitute for professional medical care. Always follow your healthcare professional's instructions.        Vomiting (Adult)  Vomiting is a common symptom that may be due to different causes. These include gastroenteritis ("stomach flu"), food poisoning and gastritis. There are other more serious causes of vomiting which may be hard to diagnose early in the illness. Therefore, it is important to watch for the warning signs listed below.  The main danger from repeated vomiting is dehydration. This is due to excess loss of water and minerals from the body. When this occurs, body fluids must be replaced.  Home care  · If symptoms are severe, rest at home for the next 24 hours.  · Because your symptoms may be from an infection, wash your hands frequently and well, and use alcohol-based  to avoid spreading the infection to others.  · Wash your hands for at least 20 seconds. Hum the happy birthday song twice for the correct length of time.  · Wash your hands after using the toilet, before and after preparing food, before eating food, after changing a diaper, cleaning a wound, caring for a sick person, and blowing your nose, coughing, or sneezing. You should also wash your hands after caring for someone who is sick, touching pet food, or treats, and touching an animal, or animal waste.  · You may use acetaminophen or NSAID medicines like ibuprofen or naproxen to control fever, unless another medicine was prescribed. If you have chronic liver " or kidney disease or ever had a stomach ulcer or GI bleeding, talk with your doctor before using these medicines. Aspirin should never be used in anyone under 18 years of age who is ill with a fever. It may cause severe liver damage. Don't use NSAID medicines if you are already taking one for another condition (like arthritis) or are on aspirin (such as for heart disease, or after a stroke)  · Avoid tobacco and alcohol use, which may worsen your symptoms.  · If medicines for vomiting were prescribed, take as directed.  · Once vomiting stops, then follow these guidelines:  During the first 12 to 24 hours follow the diet below:  · Fruit juices. Apple, grape juice, clear fruit drinks, and electrolyte replacement drinks.  · Beverages. Soft drinks without caffeine; mineral water (plain or flavored), decaffeinated tea and coffee.  · Soups. Clear broth, consommé and bouillon  · Desserts. Plain gelatin, popsicles and fruit juice bars. As you feel better, you may add 6-8 ounces of yogurt per day.  During the next 24 hours you may add the following to the above:  · Hot cereal, plain toast, bread, rolls, crackers  · Plain noodles, rice, mashed potatoes, chicken noodle or rice soup  · Unsweetened canned fruit (avoid pineapple), bananas  · Limit caffeine and chocolate. No spices or seasonings except salt.  During the next 24 hours:  Gradually resume a normal diet, as you feel better and your symptoms lessen.  Follow-up care  Follow up with your healthcare provider, or as advised.  When to seek medical advice  Call your healthcare provider right away if any of these occur:  · Constant right-sided lower abdominal pain or increasing general abdominal pain  · Continued vomiting (unable to keep liquids down) for 24 hours  · Frequent diarrhea (more than 5 times a day); blood (red or black color) or mucus in diarrhea  · Reduced urine output or extreme thirst  · Weakness, dizziness or fainting  · Unusually drowsy or confused  · Fever of  100.4°F (38°C) oral or higher, or as directed  · Yellow color of the eyes or skin  Date Last Reviewed: 11/16/2015  © 1514-4064 [employer]. [address] [place] [address], [place] [address]. All rights reserved. This information is not intended as a substitute for professional medical care. Always follow your healthcare professional's instructions.

## 2020-08-14 LAB — SARS-COV-2 RNA RESP QL NAA+PROBE: NOT DETECTED

## 2020-08-15 ENCOUNTER — PATIENT MESSAGE (OUTPATIENT)
Dept: URGENT CARE | Facility: CLINIC | Age: 35
End: 2020-08-15

## 2020-11-02 ENCOUNTER — OFFICE VISIT (OUTPATIENT)
Dept: URGENT CARE | Facility: CLINIC | Age: 35
End: 2020-11-02
Payer: MEDICAID

## 2020-11-02 VITALS
OXYGEN SATURATION: 99 % | DIASTOLIC BLOOD PRESSURE: 79 MMHG | HEIGHT: 60 IN | TEMPERATURE: 98 F | SYSTOLIC BLOOD PRESSURE: 120 MMHG | BODY MASS INDEX: 31.02 KG/M2 | WEIGHT: 158 LBS | RESPIRATION RATE: 16 BRPM | HEART RATE: 82 BPM

## 2020-11-02 DIAGNOSIS — H60.391 INFECTIVE OTITIS EXTERNA, RIGHT: Primary | ICD-10-CM

## 2020-11-02 PROCEDURE — 99214 OFFICE O/P EST MOD 30 MIN: CPT | Mod: S$GLB,,, | Performed by: NURSE PRACTITIONER

## 2020-11-02 PROCEDURE — 99214 PR OFFICE/OUTPT VISIT, EST, LEVL IV, 30-39 MIN: ICD-10-PCS | Mod: S$GLB,,, | Performed by: NURSE PRACTITIONER

## 2020-11-02 RX ORDER — DICLOFENAC SODIUM 50 MG/1
50 TABLET, DELAYED RELEASE ORAL 3 TIMES DAILY PRN
Qty: 30 TABLET | Refills: 0 | Status: SHIPPED | OUTPATIENT
Start: 2020-11-02 | End: 2021-01-12 | Stop reason: ALTCHOICE

## 2020-11-02 RX ORDER — HYDROCODONE BITARTRATE AND ACETAMINOPHEN 5; 325 MG/1; MG/1
1 TABLET ORAL EVERY 6 HOURS PRN
Qty: 8 TABLET | Refills: 0 | Status: SHIPPED | OUTPATIENT
Start: 2020-11-02 | End: 2023-06-06

## 2020-11-02 RX ORDER — ONDANSETRON 4 MG/1
4 TABLET, ORALLY DISINTEGRATING ORAL EVERY 6 HOURS PRN
Qty: 30 TABLET | Refills: 0 | OUTPATIENT
Start: 2020-11-02 | End: 2021-09-09

## 2020-11-02 RX ORDER — CIPROFLOXACIN AND DEXAMETHASONE 3; 1 MG/ML; MG/ML
4 SUSPENSION/ DROPS AURICULAR (OTIC) 2 TIMES DAILY
Qty: 7.5 ML | Refills: 0 | Status: SHIPPED | OUTPATIENT
Start: 2020-11-02 | End: 2023-06-06

## 2020-11-02 NOTE — PROGRESS NOTES
Subjective:       Patient ID: Amara Dasilva is a 35 y.o. female.    Vitals:  height is 5' (1.524 m) and weight is 71.7 kg (158 lb). Her temperature is 98.4 °F (36.9 °C). Her blood pressure is 120/79 and her pulse is 82. Her respiration is 16 and oxygen saturation is 99%.     Chief Complaint: Otalgia (Possible ear infection for past two days. Pt. c/o throbbing in right ear with yellow leakage from ear)    HPI    Constitution: Negative for appetite change, chills and fever.   HENT: Positive for ear pain, ear discharge and hearing loss. Negative for congestion and sore throat.    Neck: Negative for painful lymph nodes.   Eyes: Negative for eye discharge and eye redness.   Respiratory: Negative for cough.    Gastrointestinal: Negative for vomiting and diarrhea.   Genitourinary: Negative for dysuria.   Musculoskeletal: Negative for muscle ache.   Skin: Negative for rash and erythema.   Neurological: Negative for headaches and seizures.   Hematologic/Lymphatic: Negative for swollen lymph nodes.       Objective:      Physical Exam   Constitutional: She is oriented to person, place, and time. She appears well-developed.   HENT:   Head: Normocephalic and atraumatic. Head is without abrasion, without contusion and without laceration.   Ears:   Right Ear: External ear normal. There is drainage, swelling (erythema to ear canal) and tenderness. A foreign body (PE tube, in place) is present. Decreased hearing is noted.   Left Ear: External ear normal.   Nose: Nose normal.   Mouth/Throat: Oropharynx is clear and moist and mucous membranes are normal.   Eyes: Pupils are equal, round, and reactive to light. Conjunctivae, EOM and lids are normal.   Neck: Trachea normal, full passive range of motion without pain and phonation normal. Neck supple.   Cardiovascular: Normal rate, regular rhythm and normal heart sounds.   Pulmonary/Chest: Effort normal and breath sounds normal. No stridor. No respiratory distress.   Musculoskeletal:  Normal range of motion.   Neurological: She is alert and oriented to person, place, and time.   Skin: Skin is warm, dry, intact and no rash. Capillary refill takes less than 2 seconds. abrasion, burn, bruising, erythema and ecchymosisPsychiatric: Her speech is normal and behavior is normal. Judgment and thought content normal.   Nursing note and vitals reviewed.        Assessment:       1. Infective otitis externa, right        Plan:         Infective otitis externa, right    Other orders  -     ciprofloxacin-dexamethasone 0.3-0.1% (CIPRODEX) 0.3-0.1 % DrpS; Place 4 drops into the right ear 2 (two) times daily.  Dispense: 7.5 mL; Refill: 0  -     diclofenac (VOLTAREN) 50 MG EC tablet; Take 1 tablet (50 mg total) by mouth 3 (three) times daily as needed.  Dispense: 30 tablet; Refill: 0  -     HYDROcodone-acetaminophen (NORCO) 5-325 mg per tablet; Take 1 tablet by mouth every 6 (six) hours as needed.  Dispense: 8 tablet; Refill: 0  -     ondansetron (ZOFRAN-ODT) 4 MG TbDL; Take 1 tablet (4 mg total) by mouth every 6 (six) hours as needed.  Dispense: 30 tablet; Refill: 0

## 2020-11-03 NOTE — PATIENT INSTRUCTIONS

## 2021-01-12 ENCOUNTER — HOSPITAL ENCOUNTER (EMERGENCY)
Facility: HOSPITAL | Age: 36
Discharge: HOME | End: 2021-01-12
Attending: EMERGENCY MEDICINE
Payer: MEDICAID

## 2021-01-12 VITALS
OXYGEN SATURATION: 98 % | HEIGHT: 60 IN | BODY MASS INDEX: 30.69 KG/M2 | TEMPERATURE: 99 F | HEART RATE: 92 BPM | DIASTOLIC BLOOD PRESSURE: 68 MMHG | SYSTOLIC BLOOD PRESSURE: 119 MMHG | RESPIRATION RATE: 16 BRPM | WEIGHT: 156.31 LBS

## 2021-01-12 DIAGNOSIS — K08.89 PAIN, DENTAL: Primary | ICD-10-CM

## 2021-01-12 LAB
B-HCG UR QL: NEGATIVE
CTP QC/QA: YES

## 2021-01-12 PROCEDURE — 81025 URINE PREGNANCY TEST: CPT | Performed by: PHYSICIAN ASSISTANT

## 2021-01-12 PROCEDURE — 63600175 PHARM REV CODE 636 W HCPCS: Performed by: EMERGENCY MEDICINE

## 2021-01-12 PROCEDURE — 99284 EMERGENCY DEPT VISIT MOD MDM: CPT | Mod: 25

## 2021-01-12 PROCEDURE — 96365 THER/PROPH/DIAG IV INF INIT: CPT

## 2021-01-12 PROCEDURE — 25000003 PHARM REV CODE 250: Performed by: EMERGENCY MEDICINE

## 2021-01-12 PROCEDURE — 96375 TX/PRO/DX INJ NEW DRUG ADDON: CPT

## 2021-01-12 RX ORDER — KETOROLAC TROMETHAMINE 30 MG/ML
15 INJECTION, SOLUTION INTRAMUSCULAR; INTRAVENOUS
Status: COMPLETED | OUTPATIENT
Start: 2021-01-12 | End: 2021-01-12

## 2021-01-12 RX ORDER — KETOROLAC TROMETHAMINE 10 MG/1
10 TABLET, FILM COATED ORAL EVERY 8 HOURS PRN
Qty: 12 TABLET | Refills: 0 | Status: SHIPPED | OUTPATIENT
Start: 2021-01-12 | End: 2021-01-17

## 2021-01-12 RX ORDER — CLINDAMYCIN PHOSPHATE 900 MG/50ML
900 INJECTION, SOLUTION INTRAVENOUS
Status: COMPLETED | OUTPATIENT
Start: 2021-01-12 | End: 2021-01-12

## 2021-01-12 RX ADMIN — KETOROLAC TROMETHAMINE 15 MG: 30 INJECTION, SOLUTION INTRAMUSCULAR; INTRAVENOUS at 03:01

## 2021-01-12 RX ADMIN — CLINDAMYCIN IN 5 PERCENT DEXTROSE 900 MG: 18 INJECTION, SOLUTION INTRAVENOUS at 03:01

## 2021-02-17 ENCOUNTER — OFFICE VISIT (OUTPATIENT)
Dept: URGENT CARE | Facility: CLINIC | Age: 36
End: 2021-02-17
Payer: MEDICAID

## 2021-02-17 VITALS
DIASTOLIC BLOOD PRESSURE: 87 MMHG | OXYGEN SATURATION: 99 % | BODY MASS INDEX: 31.25 KG/M2 | RESPIRATION RATE: 16 BRPM | SYSTOLIC BLOOD PRESSURE: 127 MMHG | HEART RATE: 94 BPM | WEIGHT: 160 LBS

## 2021-02-17 DIAGNOSIS — K08.89 PAIN, DENTAL: ICD-10-CM

## 2021-02-17 DIAGNOSIS — B37.31 VAGINAL CANDIDIASIS: ICD-10-CM

## 2021-02-17 DIAGNOSIS — N30.01 ACUTE CYSTITIS WITH HEMATURIA: ICD-10-CM

## 2021-02-17 DIAGNOSIS — N12 PYELONEPHRITIS: ICD-10-CM

## 2021-02-17 DIAGNOSIS — R30.0 DYSURIA: Primary | ICD-10-CM

## 2021-02-17 LAB
B-HCG UR QL: NEGATIVE
BILIRUB UR QL STRIP: NEGATIVE
CTP QC/QA: YES
GLUCOSE UR QL STRIP: NEGATIVE
KETONES UR QL STRIP: NEGATIVE
LEUKOCYTE ESTERASE UR QL STRIP: NEGATIVE
PH, POC UA: 5
POC BLOOD, URINE: POSITIVE
POC NITRATES, URINE: POSITIVE
PROT UR QL STRIP: NEGATIVE
SP GR UR STRIP: 1.02 (ref 1–1.03)
UROBILINOGEN UR STRIP-ACNC: ABNORMAL (ref 0.1–1.1)

## 2021-02-17 PROCEDURE — 81003 URINALYSIS AUTO W/O SCOPE: CPT | Mod: QW,S$GLB,, | Performed by: NURSE PRACTITIONER

## 2021-02-17 PROCEDURE — 81025 URINE PREGNANCY TEST: CPT | Mod: S$GLB,,, | Performed by: NURSE PRACTITIONER

## 2021-02-17 PROCEDURE — 99214 PR OFFICE/OUTPT VISIT, EST, LEVL IV, 30-39 MIN: ICD-10-PCS | Mod: 25,S$GLB,, | Performed by: NURSE PRACTITIONER

## 2021-02-17 PROCEDURE — 99214 OFFICE O/P EST MOD 30 MIN: CPT | Mod: 25,S$GLB,, | Performed by: NURSE PRACTITIONER

## 2021-02-17 PROCEDURE — 81025 PR  URINE PREGNANCY TEST: ICD-10-PCS | Mod: S$GLB,,, | Performed by: NURSE PRACTITIONER

## 2021-02-17 PROCEDURE — 81003 POCT URINALYSIS, DIPSTICK, AUTOMATED, W/O SCOPE: ICD-10-PCS | Mod: QW,S$GLB,, | Performed by: NURSE PRACTITIONER

## 2021-02-17 RX ORDER — HYDROCODONE BITARTRATE AND ACETAMINOPHEN 7.5; 325 MG/1; MG/1
TABLET ORAL
COMMUNITY
Start: 2021-02-05 | End: 2023-06-06

## 2021-02-17 RX ORDER — PENICILLIN V POTASSIUM 500 MG/1
TABLET, FILM COATED ORAL
COMMUNITY
Start: 2021-02-01 | End: 2021-02-17

## 2021-02-17 RX ORDER — FLUCONAZOLE 150 MG/1
150 TABLET ORAL DAILY
Qty: 1 TABLET | Refills: 0 | Status: SHIPPED | OUTPATIENT
Start: 2021-02-17 | End: 2021-02-18

## 2021-02-17 RX ORDER — IBUPROFEN 800 MG/1
TABLET ORAL
COMMUNITY
End: 2022-02-09

## 2021-02-17 RX ORDER — AMOXICILLIN 500 MG/1
TABLET, FILM COATED ORAL
COMMUNITY
Start: 2021-02-05 | End: 2021-02-17

## 2021-02-17 RX ORDER — LACTULOSE 10 G/15ML
SOLUTION ORAL; RECTAL
COMMUNITY
End: 2023-06-06

## 2021-02-17 RX ORDER — CLINDAMYCIN HYDROCHLORIDE 300 MG/1
CAPSULE ORAL
COMMUNITY
Start: 2021-01-12 | End: 2023-06-06

## 2021-02-17 RX ORDER — TRETINOIN 0.5 MG/G
CREAM TOPICAL
COMMUNITY
End: 2023-06-06

## 2021-02-17 RX ORDER — PRENATAL WITH FERROUS FUM AND FOLIC ACID 3080; 920; 120; 400; 22; 1.84; 3; 20; 10; 1; 12; 200; 27; 25; 2 [IU]/1; [IU]/1; MG/1; [IU]/1; MG/1; MG/1; MG/1; MG/1; MG/1; MG/1; UG/1; MG/1; MG/1; MG/1; MG/1
TABLET ORAL
COMMUNITY
Start: 2021-02-11 | End: 2023-06-06

## 2021-02-17 RX ORDER — TIZANIDINE 4 MG/1
TABLET ORAL
COMMUNITY
End: 2023-06-06

## 2021-02-17 RX ORDER — POLYETHYLENE GLYCOL 3350 17 G/17G
POWDER, FOR SOLUTION ORAL
COMMUNITY
End: 2023-06-06

## 2021-02-17 RX ORDER — CEFTRIAXONE 1 G/1
1 INJECTION, POWDER, FOR SOLUTION INTRAMUSCULAR; INTRAVENOUS
Status: COMPLETED | OUTPATIENT
Start: 2021-02-17 | End: 2021-02-17

## 2021-02-17 RX ORDER — MELOXICAM 15 MG/1
TABLET ORAL
COMMUNITY
End: 2022-02-09

## 2021-02-17 RX ORDER — TRIAZOLAM 0.25 MG/1
TABLET ORAL
COMMUNITY
Start: 2021-02-04 | End: 2023-06-06

## 2021-02-17 RX ORDER — LIDOCAINE HYDROCHLORIDE 20 MG/ML
SOLUTION OROPHARYNGEAL
COMMUNITY
End: 2023-06-06

## 2021-02-17 RX ORDER — FLUCONAZOLE 150 MG/1
TABLET ORAL
COMMUNITY
Start: 2021-02-01 | End: 2021-02-17 | Stop reason: ALTCHOICE

## 2021-02-17 RX ORDER — CIPROFLOXACIN 500 MG/1
500 TABLET ORAL EVERY 12 HOURS
Qty: 14 TABLET | Refills: 0 | Status: SHIPPED | OUTPATIENT
Start: 2021-02-17 | End: 2021-02-24

## 2021-02-17 RX ORDER — HYDROCORTISONE 25 MG/G
CREAM TOPICAL
COMMUNITY
End: 2023-06-06

## 2021-02-17 RX ADMIN — CEFTRIAXONE 1 G: 1 INJECTION, POWDER, FOR SOLUTION INTRAMUSCULAR; INTRAVENOUS at 01:02

## 2021-02-21 ENCOUNTER — TELEPHONE (OUTPATIENT)
Dept: URGENT CARE | Facility: CLINIC | Age: 36
End: 2021-02-21

## 2021-02-21 LAB
BACTERIA UR CULT: ABNORMAL
BACTERIA UR CULT: ABNORMAL
OTHER ANTIBIOTIC SUSC ISLT: ABNORMAL

## 2021-05-06 ENCOUNTER — PATIENT MESSAGE (OUTPATIENT)
Dept: RESEARCH | Facility: HOSPITAL | Age: 36
End: 2021-05-06

## 2021-07-27 ENCOUNTER — OFFICE VISIT (OUTPATIENT)
Dept: URGENT CARE | Facility: CLINIC | Age: 36
End: 2021-07-27
Payer: MEDICAID

## 2021-07-27 VITALS
TEMPERATURE: 99 F | RESPIRATION RATE: 16 BRPM | SYSTOLIC BLOOD PRESSURE: 133 MMHG | HEIGHT: 62 IN | OXYGEN SATURATION: 99 % | DIASTOLIC BLOOD PRESSURE: 95 MMHG | BODY MASS INDEX: 30.55 KG/M2 | HEART RATE: 83 BPM | WEIGHT: 166 LBS

## 2021-07-27 DIAGNOSIS — M25.561 ACUTE PAIN OF RIGHT KNEE: Primary | ICD-10-CM

## 2021-07-27 DIAGNOSIS — R52 BODY ACHES: ICD-10-CM

## 2021-07-27 DIAGNOSIS — Z20.822 COVID-19 VIRUS NOT DETECTED: ICD-10-CM

## 2021-07-27 LAB
CTP QC/QA: YES
SARS-COV-2 RDRP RESP QL NAA+PROBE: NEGATIVE

## 2021-07-27 PROCEDURE — 87635 PR SARS-COV-2 COVID-19 AMPLIFIED PROBE: ICD-10-PCS | Mod: QW,S$GLB,, | Performed by: NURSE PRACTITIONER

## 2021-07-27 PROCEDURE — 99213 OFFICE O/P EST LOW 20 MIN: CPT | Mod: S$GLB,,, | Performed by: NURSE PRACTITIONER

## 2021-07-27 PROCEDURE — 99213 PR OFFICE/OUTPT VISIT, EST, LEVL III, 20-29 MIN: ICD-10-PCS | Mod: S$GLB,,, | Performed by: NURSE PRACTITIONER

## 2021-07-27 PROCEDURE — 87635 SARS-COV-2 COVID-19 AMP PRB: CPT | Mod: QW,S$GLB,, | Performed by: NURSE PRACTITIONER

## 2021-07-27 RX ORDER — MELOXICAM 15 MG/1
15 TABLET ORAL DAILY
Qty: 20 TABLET | Refills: 0 | OUTPATIENT
Start: 2021-07-27 | End: 2022-02-09

## 2021-09-09 ENCOUNTER — HOSPITAL ENCOUNTER (EMERGENCY)
Facility: HOSPITAL | Age: 36
Discharge: HOME | End: 2021-09-09
Attending: EMERGENCY MEDICINE
Payer: MEDICAID

## 2021-09-09 VITALS
HEIGHT: 62 IN | TEMPERATURE: 99 F | DIASTOLIC BLOOD PRESSURE: 75 MMHG | SYSTOLIC BLOOD PRESSURE: 124 MMHG | WEIGHT: 166 LBS | RESPIRATION RATE: 16 BRPM | OXYGEN SATURATION: 100 % | HEART RATE: 76 BPM | BODY MASS INDEX: 30.55 KG/M2

## 2021-09-09 DIAGNOSIS — R07.9 CHEST PAIN: ICD-10-CM

## 2021-09-09 DIAGNOSIS — U07.1 COVID-19: Primary | ICD-10-CM

## 2021-09-09 DIAGNOSIS — R40.20 LOSS OF CONSCIOUSNESS: ICD-10-CM

## 2021-09-09 LAB
ALBUMIN SERPL BCP-MCNC: 4.3 G/DL (ref 3.5–5.2)
ALP SERPL-CCNC: 57 U/L (ref 55–135)
ALT SERPL W/O P-5'-P-CCNC: 24 U/L (ref 10–44)
ANION GAP SERPL CALC-SCNC: 13 MMOL/L (ref 8–16)
AST SERPL-CCNC: 19 U/L (ref 10–40)
B-HCG UR QL: NEGATIVE
BASOPHILS # BLD AUTO: 0.03 K/UL (ref 0–0.2)
BASOPHILS NFR BLD: 0.5 % (ref 0–1.9)
BILIRUB SERPL-MCNC: 0.5 MG/DL (ref 0.1–1)
BUN SERPL-MCNC: 10 MG/DL (ref 6–20)
CALCIUM SERPL-MCNC: 9.9 MG/DL (ref 8.7–10.5)
CHLORIDE SERPL-SCNC: 102 MMOL/L (ref 95–110)
CO2 SERPL-SCNC: 25 MMOL/L (ref 23–29)
CREAT SERPL-MCNC: 0.8 MG/DL (ref 0.5–1.4)
CTP QC/QA: YES
DIFFERENTIAL METHOD: ABNORMAL
EOSINOPHIL # BLD AUTO: 0 K/UL (ref 0–0.5)
EOSINOPHIL NFR BLD: 0.5 % (ref 0–8)
ERYTHROCYTE [DISTWIDTH] IN BLOOD BY AUTOMATED COUNT: 12.3 % (ref 11.5–14.5)
EST. GFR  (AFRICAN AMERICAN): >60 ML/MIN/1.73 M^2
EST. GFR  (NON AFRICAN AMERICAN): >60 ML/MIN/1.73 M^2
GLUCOSE SERPL-MCNC: 93 MG/DL (ref 70–110)
HCT VFR BLD AUTO: 42.8 % (ref 37–48.5)
HGB BLD-MCNC: 13.5 G/DL (ref 12–16)
IMM GRANULOCYTES # BLD AUTO: 0.01 K/UL (ref 0–0.04)
IMM GRANULOCYTES NFR BLD AUTO: 0.2 % (ref 0–0.5)
LYMPHOCYTES # BLD AUTO: 1.1 K/UL (ref 1–4.8)
LYMPHOCYTES NFR BLD: 18.3 % (ref 18–48)
MCH RBC QN AUTO: 28.5 PG (ref 27–31)
MCHC RBC AUTO-ENTMCNC: 31.5 G/DL (ref 32–36)
MCV RBC AUTO: 90 FL (ref 82–98)
MONOCYTES # BLD AUTO: 0.5 K/UL (ref 0.3–1)
MONOCYTES NFR BLD: 8.2 % (ref 4–15)
NEUTROPHILS # BLD AUTO: 4.4 K/UL (ref 1.8–7.7)
NEUTROPHILS NFR BLD: 72.3 % (ref 38–73)
NRBC BLD-RTO: 0 /100 WBC
PLATELET # BLD AUTO: 297 K/UL (ref 150–450)
PMV BLD AUTO: 9.4 FL (ref 9.2–12.9)
POTASSIUM SERPL-SCNC: 3.6 MMOL/L (ref 3.5–5.1)
PROT SERPL-MCNC: 8.3 G/DL (ref 6–8.4)
RBC # BLD AUTO: 4.74 M/UL (ref 4–5.4)
SARS-COV-2 RDRP RESP QL NAA+PROBE: POSITIVE
SODIUM SERPL-SCNC: 140 MMOL/L (ref 136–145)
TROPONIN I SERPL DL<=0.01 NG/ML-MCNC: <0.006 NG/ML (ref 0–0.03)
WBC # BLD AUTO: 6.13 K/UL (ref 3.9–12.7)

## 2021-09-09 PROCEDURE — 84484 ASSAY OF TROPONIN QUANT: CPT | Performed by: NURSE PRACTITIONER

## 2021-09-09 PROCEDURE — 99285 EMERGENCY DEPT VISIT HI MDM: CPT | Mod: 25

## 2021-09-09 PROCEDURE — 36415 COLL VENOUS BLD VENIPUNCTURE: CPT | Performed by: NURSE PRACTITIONER

## 2021-09-09 PROCEDURE — 93005 ELECTROCARDIOGRAM TRACING: CPT

## 2021-09-09 PROCEDURE — 81025 URINE PREGNANCY TEST: CPT | Performed by: NURSE PRACTITIONER

## 2021-09-09 PROCEDURE — 85025 COMPLETE CBC W/AUTO DIFF WBC: CPT | Performed by: NURSE PRACTITIONER

## 2021-09-09 PROCEDURE — U0002 COVID-19 LAB TEST NON-CDC: HCPCS | Performed by: NURSE PRACTITIONER

## 2021-09-09 PROCEDURE — 96372 THER/PROPH/DIAG INJ SC/IM: CPT

## 2021-09-09 PROCEDURE — 63600175 PHARM REV CODE 636 W HCPCS: Performed by: NURSE PRACTITIONER

## 2021-09-09 PROCEDURE — 80053 COMPREHEN METABOLIC PANEL: CPT | Performed by: NURSE PRACTITIONER

## 2021-09-09 PROCEDURE — 25000003 PHARM REV CODE 250: Performed by: NURSE PRACTITIONER

## 2021-09-09 RX ORDER — ONDANSETRON 4 MG/1
4 TABLET, ORALLY DISINTEGRATING ORAL
Status: COMPLETED | OUTPATIENT
Start: 2021-09-09 | End: 2021-09-09

## 2021-09-09 RX ORDER — ONDANSETRON 4 MG/1
8 TABLET, ORALLY DISINTEGRATING ORAL EVERY 6 HOURS PRN
Qty: 20 TABLET | Refills: 0 | Status: SHIPPED | OUTPATIENT
Start: 2021-09-09 | End: 2023-06-06

## 2021-09-09 RX ORDER — KETOROLAC TROMETHAMINE 30 MG/ML
15 INJECTION, SOLUTION INTRAMUSCULAR; INTRAVENOUS
Status: COMPLETED | OUTPATIENT
Start: 2021-09-09 | End: 2021-09-09

## 2021-09-09 RX ORDER — ACETAMINOPHEN 500 MG
1000 TABLET ORAL
Status: COMPLETED | OUTPATIENT
Start: 2021-09-09 | End: 2021-09-09

## 2021-09-09 RX ADMIN — KETOROLAC TROMETHAMINE 15 MG: 30 INJECTION, SOLUTION INTRAMUSCULAR at 11:09

## 2021-09-09 RX ADMIN — ONDANSETRON 4 MG: 4 TABLET, ORALLY DISINTEGRATING ORAL at 10:09

## 2021-09-09 RX ADMIN — ACETAMINOPHEN 1000 MG: 500 TABLET ORAL at 10:09

## 2021-09-10 ENCOUNTER — INFUSION (OUTPATIENT)
Dept: INFECTIOUS DISEASES | Facility: HOSPITAL | Age: 36
End: 2021-09-10
Attending: NURSE PRACTITIONER
Payer: MEDICAID

## 2021-09-10 VITALS
OXYGEN SATURATION: 100 % | DIASTOLIC BLOOD PRESSURE: 92 MMHG | HEART RATE: 83 BPM | SYSTOLIC BLOOD PRESSURE: 144 MMHG | RESPIRATION RATE: 18 BRPM | TEMPERATURE: 98 F

## 2021-09-10 DIAGNOSIS — U07.1 COVID-19: ICD-10-CM

## 2021-09-10 PROCEDURE — 63600175 PHARM REV CODE 636 W HCPCS: Performed by: NURSE PRACTITIONER

## 2021-09-10 PROCEDURE — 25000003 PHARM REV CODE 250: Performed by: NURSE PRACTITIONER

## 2021-09-10 PROCEDURE — M0243 CASIRIVI AND IMDEVI INFUSION: HCPCS | Performed by: NURSE PRACTITIONER

## 2021-09-10 RX ORDER — ACETAMINOPHEN 325 MG/1
650 TABLET ORAL ONCE AS NEEDED
Status: DISCONTINUED | OUTPATIENT
Start: 2021-09-10 | End: 2024-01-13

## 2021-09-10 RX ORDER — ONDANSETRON 4 MG/1
4 TABLET, ORALLY DISINTEGRATING ORAL ONCE AS NEEDED
Status: DISCONTINUED | OUTPATIENT
Start: 2021-09-10 | End: 2024-01-13

## 2021-09-10 RX ORDER — DIPHENHYDRAMINE HYDROCHLORIDE 50 MG/ML
25 INJECTION INTRAMUSCULAR; INTRAVENOUS ONCE AS NEEDED
Status: DISCONTINUED | OUTPATIENT
Start: 2021-09-10 | End: 2022-08-11

## 2021-09-10 RX ORDER — EPINEPHRINE 0.3 MG/.3ML
0.3 INJECTION SUBCUTANEOUS
Status: DISCONTINUED | OUTPATIENT
Start: 2021-09-10 | End: 2024-01-13

## 2021-09-10 RX ORDER — ALBUTEROL SULFATE 90 UG/1
2 AEROSOL, METERED RESPIRATORY (INHALATION)
Status: DISCONTINUED | OUTPATIENT
Start: 2021-09-10 | End: 2024-01-13

## 2021-09-10 RX ORDER — SODIUM CHLORIDE 0.9 % (FLUSH) 0.9 %
10 SYRINGE (ML) INJECTION
Status: DISCONTINUED | OUTPATIENT
Start: 2021-09-10 | End: 2024-01-13

## 2021-09-10 RX ADMIN — CASIRIVIMAB AND IMDEVIMAB 600 MG: 600; 600 INJECTION, SOLUTION, CONCENTRATE INTRAVENOUS at 10:09

## 2021-09-11 ENCOUNTER — NURSE TRIAGE (OUTPATIENT)
Dept: ADMINISTRATIVE | Facility: CLINIC | Age: 36
End: 2021-09-11

## 2022-01-12 LAB
HBV SURFACE AG SERPL QL IA: NEGATIVE
HIV-1 AND HIV-2 ANTIBODIES: NEGATIVE
RPR: NORMAL
RUBELLA IMMUNE STATUS: NORMAL

## 2022-02-09 ENCOUNTER — HOSPITAL ENCOUNTER (EMERGENCY)
Facility: HOSPITAL | Age: 37
Discharge: HOME | End: 2022-02-09
Attending: EMERGENCY MEDICINE
Payer: COMMERCIAL

## 2022-02-09 VITALS
TEMPERATURE: 99 F | HEIGHT: 62 IN | WEIGHT: 160 LBS | SYSTOLIC BLOOD PRESSURE: 132 MMHG | DIASTOLIC BLOOD PRESSURE: 65 MMHG | HEART RATE: 95 BPM | OXYGEN SATURATION: 99 % | RESPIRATION RATE: 18 BRPM | BODY MASS INDEX: 29.44 KG/M2

## 2022-02-09 DIAGNOSIS — O21.0 HYPEREMESIS GRAVIDARUM: Primary | ICD-10-CM

## 2022-02-09 DIAGNOSIS — N30.00 ACUTE CYSTITIS WITHOUT HEMATURIA: ICD-10-CM

## 2022-02-09 LAB
ALBUMIN SERPL BCP-MCNC: 3.9 G/DL (ref 3.5–5.2)
ALP SERPL-CCNC: 48 U/L (ref 55–135)
ALT SERPL W/O P-5'-P-CCNC: 18 U/L (ref 10–44)
ANION GAP SERPL CALC-SCNC: 12 MMOL/L (ref 8–16)
AST SERPL-CCNC: 11 U/L (ref 10–40)
B-HCG UR QL: POSITIVE
BACTERIA #/AREA URNS HPF: ABNORMAL /HPF
BASOPHILS # BLD AUTO: 0.02 K/UL (ref 0–0.2)
BASOPHILS NFR BLD: 0.3 % (ref 0–1.9)
BILIRUB SERPL-MCNC: 0.5 MG/DL (ref 0.1–1)
BILIRUB UR QL STRIP: NEGATIVE
BUN SERPL-MCNC: 7 MG/DL (ref 6–20)
CALCIUM SERPL-MCNC: 10 MG/DL (ref 8.7–10.5)
CHLORIDE SERPL-SCNC: 103 MMOL/L (ref 95–110)
CLARITY UR: CLEAR
CO2 SERPL-SCNC: 22 MMOL/L (ref 23–29)
COLOR UR: YELLOW
CREAT SERPL-MCNC: 0.7 MG/DL (ref 0.5–1.4)
DIFFERENTIAL METHOD: ABNORMAL
EOSINOPHIL # BLD AUTO: 0 K/UL (ref 0–0.5)
EOSINOPHIL NFR BLD: 0.3 % (ref 0–8)
ERYTHROCYTE [DISTWIDTH] IN BLOOD BY AUTOMATED COUNT: 12.9 % (ref 11.5–14.5)
EST. GFR  (AFRICAN AMERICAN): >60 ML/MIN/1.73 M^2
EST. GFR  (NON AFRICAN AMERICAN): >60 ML/MIN/1.73 M^2
GLUCOSE SERPL-MCNC: 81 MG/DL (ref 70–110)
GLUCOSE UR QL STRIP: NEGATIVE
HCT VFR BLD AUTO: 34.5 % (ref 37–48.5)
HGB BLD-MCNC: 11.2 G/DL (ref 12–16)
HGB UR QL STRIP: NEGATIVE
HYALINE CASTS #/AREA URNS LPF: 0 /LPF
IMM GRANULOCYTES # BLD AUTO: 0.02 K/UL (ref 0–0.04)
IMM GRANULOCYTES NFR BLD AUTO: 0.3 % (ref 0–0.5)
KETONES UR QL STRIP: ABNORMAL
LEUKOCYTE ESTERASE UR QL STRIP: ABNORMAL
LIPASE SERPL-CCNC: 28 U/L (ref 4–60)
LYMPHOCYTES # BLD AUTO: 1.2 K/UL (ref 1–4.8)
LYMPHOCYTES NFR BLD: 14.7 % (ref 18–48)
MCH RBC QN AUTO: 28.3 PG (ref 27–31)
MCHC RBC AUTO-ENTMCNC: 32.5 G/DL (ref 32–36)
MCV RBC AUTO: 87 FL (ref 82–98)
MICROSCOPIC COMMENT: ABNORMAL
MONOCYTES # BLD AUTO: 0.4 K/UL (ref 0.3–1)
MONOCYTES NFR BLD: 4.6 % (ref 4–15)
NEUTROPHILS # BLD AUTO: 6.4 K/UL (ref 1.8–7.7)
NEUTROPHILS NFR BLD: 79.8 % (ref 38–73)
NITRITE UR QL STRIP: NEGATIVE
NRBC BLD-RTO: 0 /100 WBC
PH UR STRIP: 6 [PH] (ref 5–8)
PLATELET # BLD AUTO: 251 K/UL (ref 150–450)
PMV BLD AUTO: 9.3 FL (ref 9.2–12.9)
POTASSIUM SERPL-SCNC: 3.4 MMOL/L (ref 3.5–5.1)
PROT SERPL-MCNC: 7.9 G/DL (ref 6–8.4)
PROT UR QL STRIP: ABNORMAL
RBC # BLD AUTO: 3.96 M/UL (ref 4–5.4)
RBC #/AREA URNS HPF: 0 /HPF (ref 0–4)
SODIUM SERPL-SCNC: 137 MMOL/L (ref 136–145)
SP GR UR STRIP: >=1.03 (ref 1–1.03)
SQUAMOUS #/AREA URNS HPF: 2 /HPF
URN SPEC COLLECT METH UR: ABNORMAL
UROBILINOGEN UR STRIP-ACNC: 1 EU/DL
WBC # BLD AUTO: 7.98 K/UL (ref 3.9–12.7)
WBC #/AREA URNS HPF: 6 /HPF (ref 0–5)

## 2022-02-09 PROCEDURE — 96361 HYDRATE IV INFUSION ADD-ON: CPT

## 2022-02-09 PROCEDURE — 81025 URINE PREGNANCY TEST: CPT | Performed by: EMERGENCY MEDICINE

## 2022-02-09 PROCEDURE — 36415 COLL VENOUS BLD VENIPUNCTURE: CPT | Performed by: EMERGENCY MEDICINE

## 2022-02-09 PROCEDURE — 25000003 PHARM REV CODE 250: Performed by: EMERGENCY MEDICINE

## 2022-02-09 PROCEDURE — 83690 ASSAY OF LIPASE: CPT | Performed by: EMERGENCY MEDICINE

## 2022-02-09 PROCEDURE — 99284 EMERGENCY DEPT VISIT MOD MDM: CPT | Mod: 25

## 2022-02-09 PROCEDURE — 63600175 PHARM REV CODE 636 W HCPCS: Performed by: EMERGENCY MEDICINE

## 2022-02-09 PROCEDURE — 85025 COMPLETE CBC W/AUTO DIFF WBC: CPT | Performed by: EMERGENCY MEDICINE

## 2022-02-09 PROCEDURE — 96365 THER/PROPH/DIAG IV INF INIT: CPT

## 2022-02-09 PROCEDURE — 80053 COMPREHEN METABOLIC PANEL: CPT | Performed by: EMERGENCY MEDICINE

## 2022-02-09 PROCEDURE — 81000 URINALYSIS NONAUTO W/SCOPE: CPT | Performed by: NURSE PRACTITIONER

## 2022-02-09 RX ORDER — DOXYLAMINE SUCCINATE AND PYRIDOXINE HYDROCHLORIDE, DELAYED RELEASE TABLETS 10 MG/10 MG 10; 10 MG/1; MG/1
2 TABLET, DELAYED RELEASE ORAL NIGHTLY
Qty: 60 TABLET | Refills: 0 | Status: SHIPPED | OUTPATIENT
Start: 2022-02-09 | End: 2022-03-11

## 2022-02-09 RX ORDER — PROMETHAZINE HYDROCHLORIDE 25 MG/1
25 SUPPOSITORY RECTAL EVERY 6 HOURS PRN
Qty: 12 SUPPOSITORY | Refills: 0 | Status: SHIPPED | OUTPATIENT
Start: 2022-02-09 | End: 2023-06-06

## 2022-02-09 RX ORDER — CEPHALEXIN 500 MG/1
500 CAPSULE ORAL EVERY 8 HOURS
Qty: 21 CAPSULE | Refills: 0 | Status: SHIPPED | OUTPATIENT
Start: 2022-02-09 | End: 2022-02-16

## 2022-02-09 RX ADMIN — SODIUM CHLORIDE 1000 ML: 0.9 INJECTION, SOLUTION INTRAVENOUS at 05:02

## 2022-02-09 RX ADMIN — PROMETHAZINE HYDROCHLORIDE 12.5 MG: 25 INJECTION INTRAMUSCULAR; INTRAVENOUS at 05:02

## 2022-02-09 NOTE — FIRST PROVIDER EVALUATION
" Emergency Department TeleTriage Encounter Note      CHIEF COMPLAINT    Chief Complaint   Patient presents with    Emesis During Pregnancy     12 weeks pregnant, N/V/D x 2 weeks        VITAL SIGNS   Initial Vitals [02/09/22 1439]   BP Pulse Resp Temp SpO2   132/65 95 18 99 °F (37.2 °C) 99 %      MAP       --            ALLERGIES    Review of patient's allergies indicates:   Allergen Reactions    Hydrocodone Nausea And Vomiting    Flagyl [metronidazole hcl] Other (See Comments)     Oral form only--burns her   stomach. Take IV only       PROVIDER TRIAGE NOTE  This is a teletriage evaluation of a 36 y.o. female presenting to the ED complaining of n/v/d for two weeks.  Pt states, "I think I have a kidney infection."  Pt is 12 weeks pregnant and is established with OB.  Reports lower abd pain.     Initial orders will be placed and care will be transferred to an alternate provider when patient is roomed for a full evaluation. Any additional orders and the final disposition will be determined by that provider.           ORDERS  Labs Reviewed   URINALYSIS, REFLEX TO URINE CULTURE       ED Orders (720h ago, onward)    Start Ordered     Status Ordering Provider    02/09/22 1455 02/09/22 1454  Urinalysis, Reflex to Urine Culture Urine, Clean Catch  STAT         Ordered MENA LAMAR    Unscheduled 02/09/22 1454  Assess fetal heart tones  Use PRN       Ordered MENA LAMAR            Virtual Visit Note: The provider triage portion of this emergency department evaluation and documentation was performed via [employer], a HIPAA-compliant telemedicine application, in concert with a tele-presenter in the room. A face to face patient evaluation with one of my colleagues will occur once the patient is placed in an emergency department room.      DISCLAIMER: This note was prepared with [email or web address] voice recognition transcription software. Garbled syntax, mangled pronouns, and other bizarre constructions may be " attributed to that software system.

## 2022-02-09 NOTE — ED PROVIDER NOTES
Encounter Date: 2022    SCRIBE #1 NOTE: IMalou, am scribing for, and in the presence of, Hosea Sellers MD.       History     Chief Complaint   Patient presents with    Emesis During Pregnancy     12 weeks pregnant, N/V/D x 2 weeks      Time seen by provider: 4:06 PM on 2022    Amara Dasilva is a 36 y.o. female A1 who presents to the ED at 12 weeks gestation with an onset of N/V. Patient has been experiencing N/V over the last 2 weeks and has been taking Phenergan as prescribed by OB/GYN located in [place] which has provided little to no relief. She denies taking Zofran as she is currently pregnant. She reports 1 episode of vomiting today, as well as abdominal pain and back pain. She expresses concerns for kidney stones, noting Hx of kidney stones and previous decrease in function of kidneys years ago almost requiring she be placed on dialysis. She states decreased kidney function, at the time, was result of excessive Coca [employer]a consumption. The patient denies vaginal bleeding, vaginal discharge, or any other symptoms at this time. She has had an US for current pregnancy. PMHx of C difficile colitis, ovarian cyst, endometriosis, and cystitis. PSHx of exploratory of abdomen and laser laparoscopy.     The history is provided by the patient.     Review of patient's allergies indicates:   Allergen Reactions    Hydrocodone Nausea And Vomiting    Flagyl [metronidazole hcl] Other (See Comments)     Oral form only--burns her   stomach. Take IV only     Past Medical History:   Diagnosis Date    Clostridium difficile colitis     hx--reoccurs with antibiotic use    Cystitis 2014    Endometriosis     Ovarian cyst     Ovarian cyst 2014    Pelvic pain in female      Past Surgical History:   Procedure Laterality Date    EXCISION OF LESION OF NOSE Bilateral 2019    Procedure: EXCISION, LESION, NOSE;  Surgeon: Chadd Ward MD;  Location: [facility];  Service: ENT;  Laterality:  Bilateral;    LASER LAPAROSCOPY  07/01/2013    pt has had 3 surgies     MYOMECTOMY      MYRINGOTOMY WITH INSERTION OF VENTILATION TUBE Right 2/18/2019    Procedure: MYRINGOTOMY, WITH TYMPANOSTOMY TUBE INSERTION;  Surgeon: Chadd Ward MD;  Location[facility];  Service: ENT;  Laterality: Right;    AK EXPLORATORY OF ABDOMEN  2012    AK EXPLORATORY OF ABDOMEN  04/15/2014    VAGINAL DELIVERY      x2 normal     Family History   Problem Relation Age of Onset    Hypertension Mother     Hypertension Father      Social History     Tobacco Use    Smoking status: Never Smoker    Smokeless tobacco: Never Used   Substance Use Topics    Alcohol use: No     Comment: rarely    Drug use: No     Review of Systems   Constitutional: Negative for fever.   HENT: Negative for sore throat.    Respiratory: Negative for shortness of breath.    Cardiovascular: Negative for chest pain.   Gastrointestinal: Positive for abdominal pain, nausea and vomiting.   Genitourinary: Negative for dysuria, vaginal bleeding and vaginal discharge.   Musculoskeletal: Positive for back pain.   Skin: Negative for rash.   Neurological: Negative for weakness.   Hematological: Does not bruise/bleed easily.       Physical Exam     Initial Vitals [02/09/22 1439]   BP Pulse Resp Temp SpO2   132/65 95 18 99 °F (37.2 °C) 99 %      MAP       --         Physical Exam    Nursing note and vitals reviewed.  Constitutional: She appears well-developed.  Non-toxic appearance.   HENT:   Head: Normocephalic and atraumatic.   Eyes: EOM are normal. Pupils are equal, round, and reactive to light.   Neck: Neck supple.   Cardiovascular: Normal rate, regular rhythm, normal heart sounds and intact distal pulses. Exam reveals no gallop and no friction rub.    No murmur heard.  Pulmonary/Chest: Breath sounds normal. No respiratory distress. She has no decreased breath sounds. She has no wheezes. She has no rhonchi. She has no rales.   Abdominal: Abdomen is soft. Bowel sounds  are normal. She exhibits no distension. There is no abdominal tenderness.   Musculoskeletal:         General: Normal range of motion.      Cervical back: Neck supple.     Neurological: She is alert and oriented to person, place, and time.   Skin: Skin is warm and dry.   Psychiatric: She has a normal mood and affect.         ED Course   Procedures  Labs Reviewed   URINALYSIS, REFLEX TO URINE CULTURE - Abnormal; Notable for the following components:       Result Value    Specific Gravity, UA >=1.030 (*)     Protein, UA 1+ (*)     Ketones, UA 1+ (*)     Leukocytes, UA Trace (*)     All other components within normal limits    Narrative:     Specimen Source->Urine   PREGNANCY TEST, URINE RAPID - Abnormal; Notable for the following components:    Preg Test, Ur Positive (*)     All other components within normal limits    Narrative:     Specimen Source->Urine   COMPREHENSIVE METABOLIC PANEL - Abnormal; Notable for the following components:    Potassium 3.4 (*)     CO2 22 (*)     Alkaline Phosphatase 48 (*)     All other components within normal limits   CBC W/ AUTO DIFFERENTIAL - Abnormal; Notable for the following components:    RBC 3.96 (*)     Hemoglobin 11.2 (*)     Hematocrit 34.5 (*)     Gran % 79.8 (*)     Lymph % 14.7 (*)     All other components within normal limits   URINALYSIS MICROSCOPIC - Abnormal; Notable for the following components:    WBC, UA 6 (*)     Bacteria Many (*)     All other components within normal limits    Narrative:     Specimen Source->Urine   LIPASE          Imaging Results    None          Medications   sodium chloride 0.9% bolus 1,000 mL (0 mLs Intravenous Stopped 2/9/22 1815)   promethazine (PHENERGAN) 12.5 mg in dextrose 5 % 50 mL IVPB (0 mg Intravenous Stopped 2/9/22 1734)     Medical Decision Making:   History:   Old Medical Records: I decided to obtain old medical records.  Clinical Tests:   Lab Tests: Reviewed and Ordered          Scribe Attestation:   Scribe #1: I performed the  above scribed service and the documentation accurately describes the services I performed. I attest to the accuracy of the note.        ED Course as of 02/09/22 2116 Wed Feb 09, 2022 1749 Patient appears to have hyperemesis.  She was given Phenergan here.  I will prescribe her  diclegis. She needs to follow up with her obgyn.  Able to tolerate fluids prior to dc.  [JS]   1910 Patient does not appear to be septic or toxic.  She has a mild nonspecific urinary tract infection.  No signs of pyelonephritis.  Stable for discharge [JS]      ED Course User Index  [JS] Hosea Sellers MD           I, Dr. Hosea Sellers personally performed the services described in this documentation. All medical record entries made by the scribe were at my direction and in my presence.  I have reviewed the chart and agree that the record reflects my personal performance and is accurate and complete. Hosea Sellers MD.  9:16 PM 02/09/2022    DISCLAIMER: This note was prepared with [employer] voice recognition transcription software. Garbled syntax, mangled pronouns, and other bizarre constructions may be attributed to that software system   Clinical Impression:   Final diagnoses:  [O21.0] Hyperemesis gravidarum (Primary)  [N30.00] Acute cystitis without hematuria          ED Disposition Condition    Discharge Stable        ED Prescriptions     Medication Sig Dispense Start Date End Date Auth. Provider    doxylamine-pyridoxine, vit B6, (DICLEGIS) 10-10 mg TbEC Take 2 tablets by mouth every evening. 60 tablet 2/9/2022 3/11/2022 Hosea Sellers MD    promethazine (PHENERGAN) 25 MG suppository Place 1 suppository (25 mg total) rectally every 6 (six) hours as needed for Nausea. 12 suppository 2/9/2022  Hosea Sellers MD    cephALEXin (KEFLEX) 500 MG capsule Take 1 capsule (500 mg total) by mouth every 8 (eight) hours. for 7 days 21 capsule 2/9/2022 2/16/2022 Hosea Sellers MD        Follow-up Information     Follow up  With Specialties Details Why Contact Info        follow up with your obgyn this week.    [facility] Emergency Dept Emergency Medicine Schedule an appointment as soon as possible for a visit   [address] [place] [id number] [phone]           Hosea Sellers MD  02/09/22 5579

## 2022-02-11 ENCOUNTER — PATIENT OUTREACH (OUTPATIENT)
Dept: EMERGENCY MEDICINE | Facility: HOSPITAL | Age: 37
End: 2022-02-11
Payer: MEDICAID

## 2022-02-11 NOTE — PROGRESS NOTES
Elida Reeves  ED Navigator  Emergency Department    Project: [facility] ED Navigator  Role: Community Health Worker    Date: 02/11/2022  Patient Name: Mrs. Amara Dasilva  MRN: [id number]  PCP: Jama Burt MD    Assessment:     Amara Dasilva is a 36 y.o. female who has presented to ED for nausea/vomiting. Patient has visited the ED 1 times in the past 3 months. Patient did not contact PCP.     ED Navigator Initial Assessment    ED Navigator Enrollment Documentation  Consent to Services  Does patient consent to completing the assessment?: Yes  Contact  Method of Initial Contact: Phone  Transportation  Does the patient have issues with Transportation?: No  Does the patient have transportation to and from healthcare appointments?: Yes  Insurance Coverage  Do you have coverage/adequate coverage?: Yes  Type/kind of coverage: Medicaid/UHC  Is patient able to afford co-pays/deductibles?: Yes  Is patient able to afford HME or supplies?: Yes  Does patient have an established [facility] PCP?: No  Does patient need assistance finding a PCP?: Yes  Does the patient have a lack of adequate coverage?: No  Specialist Appointment  Did the patient come to the ED to see a specialist?: No  Does the patient have a pending specialist referral?: No  Does the patient have a specialist appointment made?: No  PCP Follow Up Appointment  Has the patient had an appointment with a primary care provider in the past year?: No  Does the patient have a follow up appontment with a PCP?: No  Why does the patient not have a follow up scheduled?: Other (see comments)  Medications  Is patient able to afford medication?: Yes  Is patient unable to get medication due to lack of transportation?: No  Psychological  Does the patient have psycho-social concerns?: No  Food  Does the patient have concerns about food?: No  Communication/Education  Does the patient have limited English proficiency/English not primary language?: No  Does patient have low literacy and/or  low health literacy?: Yes  Does patient have concerns with care?: No  Does patient have dissatisfaction with care?: No  Other Financial Concerns  Does the patient have immediate financial distress?: No  Does the patient have general financial concerns?: Yes  Other Social Barriers/Concerns  Does the patient have any additional barriers or concerns?: Unable to afford utilities  Primary Barrier  Barriers identified: Cognitive barrier (health literacy, language and communication, etc.)  Root Cause of ED Utilization: Patient Knowledge/Low Health Literacy  Plan to address Patient Knowledge/Low Health Literacy: Provided information for [facility] 24/7 Nurse triage line [phone] or 1-[facility] ([phone])  Next steps: Provided Education  Was education/educational materials provided surrounding PCP services/creating a medical home?: Yes Was education verbal or written?: Written   Was education/educational materials provided surrounding low cost, healthy foods?: No    Was education/educational materials provided surrounding other items? If so, use comment to explain.: Yes Was education verbal or written?: Written   Plan: Provided information for [facility] 24/7 Nurse triage line, [phone] or 1-866-[facility] ([phone])  Expected Date of Follow Up 1: 3/4/22         Social History     Socioeconomic History    Marital status:    Tobacco Use    Smoking status: Never Smoker    Smokeless tobacco: Never Used   Substance and Sexual Activity    Alcohol use: No     Comment: rarely    Drug use: No    Sexual activity: Yes     Partners: Male     Social Determinants of Health     Financial Resource Strain: Low Risk     Difficulty of Paying Living Expenses: Not very hard   Food Insecurity: No Food Insecurity    Worried About Running Out of Food in the Last Year: Never true    Ran Out of Food in the Last Year: Never true   Transportation Needs: No Transportation Needs    Lack of Transportation  (Medical): No    Lack of Transportation (Non-Medical): No   Physical Activity: Inactive    Days of Exercise per Week: 0 days    Minutes of Exercise per Session: 0 min   Stress: No Stress Concern Present    Feeling of Stress : Not at all   Social Connections: Unknown    Frequency of Communication with Friends and Family: Three times a week    Frequency of Social Gatherings with Friends and Family: Three times a week    Marital Status:    Housing Stability: High Risk    Unable to Pay for Housing in the Last Year: Yes    Unstable Housing in the Last Year: No       Plan:   ED Navigator spoke with patient on the telephone and completed initial enrollment.  Patient reported no concerns with food, transportation, however, paying bills has been difficult in the past.  Patient reported her mortgage is on a deferred plan at this time.  Patient has two children ages 13 and 10 and is four months pregnant.  She stated she has not started purchasing anything for the baby but did get approved for WIC two days ago.  Patient stated she does not have a PCP and would like assistance in getting one.  ED Navigator emailed the following resources to patient:  [facility] brochure, [facility] Nurse Triage line, information on virtual medical visits, information on utility assistance, pregnancy support, organizations that offer financial assistance with daily living expenses.      Elida Reeves  ED Navigator

## 2022-05-05 ENCOUNTER — PATIENT MESSAGE (OUTPATIENT)
Dept: OBSTETRICS AND GYNECOLOGY | Facility: CLINIC | Age: 37
End: 2022-05-05
Payer: MEDICAID

## 2022-05-12 ENCOUNTER — PATIENT OUTREACH (OUTPATIENT)
Dept: EMERGENCY MEDICINE | Facility: HOSPITAL | Age: 37
End: 2022-05-12
Payer: MEDICAID

## 2022-05-19 LAB
C TRACH RRNA SPEC QL PROBE: NEGATIVE
HUMAN PAPILLOMAVIRUS (HPV): NORMAL
N GONORRHOEAE, AMPLIFIED DNA: NEGATIVE
PAP RECOMMENDATION EXT: NORMAL
PAP SMEAR: NORMAL

## 2022-05-23 LAB — INDIRECT COOMBS: NEGATIVE

## 2022-05-29 ENCOUNTER — HOSPITAL ENCOUNTER (EMERGENCY)
Facility: HOSPITAL | Age: 37
Discharge: HOME | End: 2022-05-29
Attending: EMERGENCY MEDICINE
Payer: COMMERCIAL

## 2022-05-29 VITALS
WEIGHT: 170 LBS | OXYGEN SATURATION: 100 % | DIASTOLIC BLOOD PRESSURE: 60 MMHG | SYSTOLIC BLOOD PRESSURE: 101 MMHG | HEART RATE: 116 BPM | TEMPERATURE: 100 F | HEIGHT: 62 IN | RESPIRATION RATE: 20 BRPM | BODY MASS INDEX: 31.28 KG/M2

## 2022-05-29 DIAGNOSIS — N30.00 ACUTE CYSTITIS WITHOUT HEMATURIA: Primary | ICD-10-CM

## 2022-05-29 DIAGNOSIS — R05.9 COUGH: ICD-10-CM

## 2022-05-29 LAB
B-HCG UR QL: POSITIVE
BACTERIA #/AREA URNS HPF: ABNORMAL /HPF
BILIRUB UR QL STRIP: NEGATIVE
CLARITY UR: ABNORMAL
COLOR UR: YELLOW
CTP QC/QA: YES
GLUCOSE UR QL STRIP: NEGATIVE
HGB UR QL STRIP: NEGATIVE
HYALINE CASTS #/AREA URNS LPF: 38 /LPF
INFLUENZA A, MOLECULAR: NEGATIVE
INFLUENZA B, MOLECULAR: NEGATIVE
KETONES UR QL STRIP: NEGATIVE
LEUKOCYTE ESTERASE UR QL STRIP: ABNORMAL
MICROSCOPIC COMMENT: ABNORMAL
NITRITE UR QL STRIP: NEGATIVE
PH UR STRIP: 7 [PH] (ref 5–8)
PROT UR QL STRIP: ABNORMAL
RBC #/AREA URNS HPF: 3 /HPF (ref 0–4)
SARS-COV-2 RDRP RESP QL NAA+PROBE: NEGATIVE
SP GR UR STRIP: 1.02 (ref 1–1.03)
SPECIMEN SOURCE: NORMAL
SQUAMOUS #/AREA URNS HPF: 2 /HPF
URN SPEC COLLECT METH UR: ABNORMAL
UROBILINOGEN UR STRIP-ACNC: NEGATIVE EU/DL
WBC #/AREA URNS HPF: 30 /HPF (ref 0–5)

## 2022-05-29 PROCEDURE — 99283 EMERGENCY DEPT VISIT LOW MDM: CPT

## 2022-05-29 PROCEDURE — 81025 URINE PREGNANCY TEST: CPT | Performed by: STUDENT IN AN ORGANIZED HEALTH CARE EDUCATION/TRAINING PROGRAM

## 2022-05-29 PROCEDURE — U0002 COVID-19 LAB TEST NON-CDC: HCPCS | Performed by: STUDENT IN AN ORGANIZED HEALTH CARE EDUCATION/TRAINING PROGRAM

## 2022-05-29 PROCEDURE — 87086 URINE CULTURE/COLONY COUNT: CPT | Performed by: STUDENT IN AN ORGANIZED HEALTH CARE EDUCATION/TRAINING PROGRAM

## 2022-05-29 PROCEDURE — 87502 INFLUENZA DNA AMP PROBE: CPT | Performed by: STUDENT IN AN ORGANIZED HEALTH CARE EDUCATION/TRAINING PROGRAM

## 2022-05-29 PROCEDURE — 81001 URINALYSIS AUTO W/SCOPE: CPT | Performed by: STUDENT IN AN ORGANIZED HEALTH CARE EDUCATION/TRAINING PROGRAM

## 2022-05-29 RX ORDER — CEPHALEXIN 500 MG/1
500 CAPSULE ORAL 4 TIMES DAILY
Qty: 28 CAPSULE | Refills: 0 | Status: SHIPPED | OUTPATIENT
Start: 2022-05-29 | End: 2022-06-05

## 2022-05-30 NOTE — ED PROVIDER NOTES
Encounter Date: 5/29/2022       History     Chief Complaint   Patient presents with    COVID-19 Concerns    Back Pain     APPROX 7 MOS PREG    Chills     36-year-old female at a gestational age of 20 weeks presents emergency room for complaints of fever, cough, chills.  She has no nausea or vomiting.  She has no chest pain or shortness of breath.  No chronic lung diseases.  Her  is currently positive for COVID-19.  She is also concerned about UTI although declines any UTI like symptoms.        Review of patient's allergies indicates:   Allergen Reactions    Hydrocodone Nausea And Vomiting    Flagyl [metronidazole hcl] Other (See Comments)     Oral form only--burns her   stomach. Take IV only     Past Medical History:   Diagnosis Date    Clostridium difficile colitis     hx--reoccurs with antibiotic use    Cystitis 5/20/2014    Endometriosis     Ovarian cyst     Ovarian cyst 5/20/2014    Pelvic pain in female      Past Surgical History:   Procedure Laterality Date    EXCISION OF LESION OF NOSE Bilateral 2/18/2019    Procedure: EXCISION, LESION, NOSE;  Surgeon: Chadd Ward MD;  Location: [facility] OR;  Service: ENT;  Laterality: Bilateral;    LASER LAPAROSCOPY  07/01/2013    pt has had 3 surgies     MYOMECTOMY      MYRINGOTOMY WITH INSERTION OF VENTILATION TUBE Right 2/18/2019    Procedure: MYRINGOTOMY, WITH TYMPANOSTOMY TUBE INSERTION;  Surgeon: Chadd Ward MD;  Location: [facility] OR;  Service: ENT;  Laterality: Right;    CO EXPLORATORY OF ABDOMEN  2012    CO EXPLORATORY OF ABDOMEN  04/15/2014    VAGINAL DELIVERY      x2 normal     Family History   Problem Relation Age of Onset    Hypertension Mother     Hypertension Father      Social History     Tobacco Use    Smoking status: Never Smoker    Smokeless tobacco: Never Used   Substance Use Topics    Alcohol use: No     Comment: rarely    Drug use: No     Review of Systems   Constitutional: Positive for chills, fatigue and fever.   HENT:  Negative for congestion, hearing loss, sore throat and trouble swallowing.    Eyes: Negative for visual disturbance.   Respiratory: Positive for cough. Negative for chest tightness and shortness of breath.    Cardiovascular: Negative for chest pain.   Gastrointestinal: Negative for abdominal pain and nausea.   Endocrine: Negative for polyuria.   Genitourinary: Negative for difficulty urinating, dysuria and urgency.   Musculoskeletal: Negative for arthralgias and myalgias.   Skin: Negative for rash.   Neurological: Negative for dizziness and headaches.   Psychiatric/Behavioral: The patient is not nervous/anxious.    All other systems reviewed and are negative.      Physical Exam     Initial Vitals [05/29/22 1758]   BP Pulse Resp Temp SpO2   98/69 (!) 123 18 99.9 °F (37.7 °C) 98 %      MAP       --         Physical Exam    Nursing note and vitals reviewed.  Constitutional: She appears well-developed and well-nourished.   HENT:   Head: Normocephalic and atraumatic.   Right Ear: External ear normal.   Left Ear: External ear normal.   Nose: Nose normal.   Mouth/Throat: Oropharynx is clear and moist.   Eyes: Conjunctivae and EOM are normal.   Neck: Neck supple.   Normal range of motion.  Cardiovascular: Normal rate, regular rhythm and normal heart sounds.   Pulmonary/Chest: Breath sounds normal. No respiratory distress. She has no wheezes. She has no rhonchi. She has no rales. She exhibits no tenderness.   Abdominal: Abdomen is soft. She exhibits no distension. There is no abdominal tenderness.   Gravid uterus   Musculoskeletal:         General: Normal range of motion.      Cervical back: Normal range of motion and neck supple.     Neurological: She is alert and oriented to person, place, and time. GCS score is 15. GCS eye subscore is 4. GCS verbal subscore is 5. GCS motor subscore is 6.   Skin: Skin is warm and dry. Capillary refill takes less than 2 seconds.   Psychiatric: She has a normal mood and affect. Her behavior  is normal. Judgment and thought content normal.         ED Course   Procedures  Labs Reviewed   URINALYSIS, REFLEX TO URINE CULTURE - Abnormal; Notable for the following components:       Result Value    Appearance, UA Hazy (*)     Protein, UA 1+ (*)     Leukocytes, UA Trace (*)     All other components within normal limits    Narrative:     Specimen Source->Urine   URINALYSIS MICROSCOPIC - Abnormal; Notable for the following components:    WBC, UA 30 (*)     Hyaline Casts, UA 38 (*)     All other components within normal limits    Narrative:     Specimen Source->Urine   POCT URINE PREGNANCY - Abnormal; Notable for the following components:    POC Preg Test, Ur Positive (*)     All other components within normal limits   CULTURE, URINE   SARS-COV-2 RNA AMPLIFICATION, QUAL   INFLUENZA A AND B ANTIGEN    Narrative:     Specimen Source->Nasopharyngeal Swab          Imaging Results    None          Medications - No data to display  Medical Decision Making:   Initial Assessment:   36-year-old female at a gestational age of 20 weeks presents emergency room for complaints of fever, cough, chills.  She has no nausea or vomiting.  She has no chest pain or shortness of breath.  No chronic lung diseases.  Her  is currently positive for COVID-19.  She is also concerned about UTI although declines any UTI like symptoms.  ED Management:  36-year-old female, currently pregnant presents emergency room for evaluation of flu-like symptoms.  Nontoxic, afebrile.  COVID negative but at home with COVID positive family member.  Likely represents false negative.  Patient also found to have bacteriuria without symptoms.  Will treat given that she is pregnant.  Plan for discharge home with symptomatic care and antibiotics.  Recommend follow-up with OBGYN and primary care.    Disposition:  Improved, discharged this  Plan to discharge home with appropriate follow-up, including primary care manager.  Will discharge with prescription for  Keflex.    I discussed the findings and plan of care with this patient.  All questions were answered to the patient's satisfaction.  Disposition plan as above.  Verbal and written discharge instructions provided to the patient on discharge.  Return precautions discussed prior to discharge.     I discuss this patient case with the cosigning physician, who agrees with diagnosis and plan of care. This note was written using the assistance of a dictation program and may contain grammatical errors.                       Clinical Impression:   Final diagnoses:  [N30.00] Acute cystitis without hematuria (Primary)  [R05.9] Cough          ED Disposition Condition    Discharge Stable        ED Prescriptions     Medication Sig Dispense Start Date End Date Auth. Provider    cephALEXin (KEFLEX) 500 MG capsule Take 1 capsule (500 mg total) by mouth 4 (four) times daily. for 7 days 28 capsule 5/29/2022 6/5/2022 Eddie Braun PA-C        Follow-up Information     Follow up With Specialties Details Why Contact Info Additional Information    Aamir Burt MD Family Medicine Schedule an appointment as soon as possible for a visit in 1 week  [address]  [place] [address]  [phone]       [facility] - Emergency Dept Emergency Medicine Go to  As needed, If symptoms worsen [address] [id number] [phone] 1st floor           Eddie Braun PA-C  05/29/22 2032

## 2022-06-01 LAB — BACTERIA UR CULT: NO GROWTH

## 2022-06-27 ENCOUNTER — HOSPITAL ENCOUNTER (OUTPATIENT)
Facility: HOSPITAL | Age: 37
Discharge: HOME | End: 2022-06-27
Attending: OBSTETRICS & GYNECOLOGY | Admitting: OBSTETRICS & GYNECOLOGY
Payer: COMMERCIAL

## 2022-06-27 VITALS — HEART RATE: 96 BPM | RESPIRATION RATE: 17 BRPM | DIASTOLIC BLOOD PRESSURE: 66 MMHG | SYSTOLIC BLOOD PRESSURE: 109 MMHG

## 2022-06-27 DIAGNOSIS — O09.529 AMA (ADVANCED MATERNAL AGE) MULTIGRAVIDA 35+: ICD-10-CM

## 2022-06-27 PROCEDURE — 59025 FETAL NON-STRESS TEST: CPT

## 2022-06-30 ENCOUNTER — HOSPITAL ENCOUNTER (OUTPATIENT)
Facility: HOSPITAL | Age: 37
Discharge: HOME | End: 2022-06-30
Attending: OBSTETRICS & GYNECOLOGY | Admitting: OBSTETRICS & GYNECOLOGY
Payer: COMMERCIAL

## 2022-06-30 VITALS — SYSTOLIC BLOOD PRESSURE: 100 MMHG | DIASTOLIC BLOOD PRESSURE: 54 MMHG | HEART RATE: 97 BPM

## 2022-06-30 DIAGNOSIS — O09.529 AMA (ADVANCED MATERNAL AGE) MULTIGRAVIDA 35+: ICD-10-CM

## 2022-06-30 PROCEDURE — 59025 FETAL NON-STRESS TEST: CPT

## 2022-07-04 ENCOUNTER — HOSPITAL ENCOUNTER (OUTPATIENT)
Facility: HOSPITAL | Age: 37
Discharge: HOME | End: 2022-07-04
Attending: OBSTETRICS & GYNECOLOGY | Admitting: OBSTETRICS & GYNECOLOGY
Payer: COMMERCIAL

## 2022-07-04 VITALS — DIASTOLIC BLOOD PRESSURE: 59 MMHG | HEART RATE: 102 BPM | SYSTOLIC BLOOD PRESSURE: 107 MMHG

## 2022-07-04 DIAGNOSIS — O09.529 AMA (ADVANCED MATERNAL AGE) MULTIGRAVIDA 35+: ICD-10-CM

## 2022-07-04 PROCEDURE — 59025 FETAL NON-STRESS TEST: CPT

## 2022-07-07 ENCOUNTER — HOSPITAL ENCOUNTER (OUTPATIENT)
Facility: HOSPITAL | Age: 37
Discharge: HOME | End: 2022-07-07
Attending: OBSTETRICS & GYNECOLOGY | Admitting: OBSTETRICS & GYNECOLOGY
Payer: COMMERCIAL

## 2022-07-07 VITALS — SYSTOLIC BLOOD PRESSURE: 112 MMHG | DIASTOLIC BLOOD PRESSURE: 59 MMHG | RESPIRATION RATE: 18 BRPM | HEART RATE: 104 BPM

## 2022-07-07 DIAGNOSIS — O09.529 AMA (ADVANCED MATERNAL AGE) MULTIGRAVIDA 35+: ICD-10-CM

## 2022-07-07 PROCEDURE — 59025 FETAL NON-STRESS TEST: CPT

## 2022-07-11 ENCOUNTER — HOSPITAL ENCOUNTER (OUTPATIENT)
Facility: HOSPITAL | Age: 37
Discharge: HOME | End: 2022-07-11
Attending: OBSTETRICS & GYNECOLOGY | Admitting: OBSTETRICS & GYNECOLOGY
Payer: COMMERCIAL

## 2022-07-11 VITALS — SYSTOLIC BLOOD PRESSURE: 106 MMHG | DIASTOLIC BLOOD PRESSURE: 64 MMHG | HEART RATE: 114 BPM

## 2022-07-11 DIAGNOSIS — O09.529 AMA (ADVANCED MATERNAL AGE) MULTIGRAVIDA 35+: ICD-10-CM

## 2022-07-11 PROCEDURE — 59025 FETAL NON-STRESS TEST: CPT | Mod: 59

## 2022-07-14 ENCOUNTER — HOSPITAL ENCOUNTER (OUTPATIENT)
Facility: HOSPITAL | Age: 37
Discharge: HOME | End: 2022-07-14
Attending: OBSTETRICS & GYNECOLOGY | Admitting: OBSTETRICS & GYNECOLOGY
Payer: COMMERCIAL

## 2022-07-14 DIAGNOSIS — O09.529 ADVANCED MATERNAL AGE IN MULTIGRAVIDA: ICD-10-CM

## 2022-07-14 PROCEDURE — 59025 FETAL NON-STRESS TEST: CPT

## 2022-07-14 NOTE — DISCHARGE INSTRUCTIONS
Keep your scheduled appointment with your provider.    Call your Doctor if you have any of the following:  Temperature above 100 degrees  Nausea, vomiting and/or diarrhea  Severe headache, dizziness, or blurred vision  Notable increase in swelling of hands or feet  Notable swelling of face and lips  Difficulty, pain or burning with urination  Foul smelling vaginal discharge  Decreased fetal movement    Come to the hospital if you have any of the following symptoms:  Your water breaks  More than 4-6 contractions in 1 hour for 2 or more hours  Vaginal bleeding like a period    After 28 weeks, you should feel 10 distinct fetal movements within a 2 hour period.    It is recommended that you drink 1/2 a gallon of water each day.  Tea, Soda and Juice are  in addition to this.     For pain you can:   Take up to 3,000mg of tylenol per day   Warm soaks in tub/shower  Heating pads  Belly bands  Drink at least 64 oz fluids per day to prevent dehydration contractions.

## 2022-07-15 ENCOUNTER — HOSPITAL ENCOUNTER (OUTPATIENT)
Dept: RADIOLOGY | Facility: HOSPITAL | Age: 37
Discharge: HOME | End: 2022-07-15
Attending: OTOLARYNGOLOGY
Payer: COMMERCIAL

## 2022-07-15 DIAGNOSIS — G47.30 SLEEP APNEA IN ADULT: ICD-10-CM

## 2022-07-15 DIAGNOSIS — E01.0 THYROMEGALY: ICD-10-CM

## 2022-07-15 PROCEDURE — 76536 US SOFT TISSUE HEAD NECK THYROID: ICD-10-PCS | Mod: 26,,, | Performed by: RADIOLOGY

## 2022-07-15 PROCEDURE — 76536 US EXAM OF HEAD AND NECK: CPT | Mod: TC

## 2022-07-15 PROCEDURE — 76536 US EXAM OF HEAD AND NECK: CPT | Mod: 26,,, | Performed by: RADIOLOGY

## 2022-07-18 ENCOUNTER — HOSPITAL ENCOUNTER (OUTPATIENT)
Facility: HOSPITAL | Age: 37
Discharge: HOME | End: 2022-07-18
Attending: OBSTETRICS & GYNECOLOGY | Admitting: OBSTETRICS & GYNECOLOGY
Payer: COMMERCIAL

## 2022-07-18 VITALS — DIASTOLIC BLOOD PRESSURE: 69 MMHG | SYSTOLIC BLOOD PRESSURE: 110 MMHG | RESPIRATION RATE: 17 BRPM | HEART RATE: 96 BPM

## 2022-07-18 DIAGNOSIS — O09.529 ADVANCED MATERNAL AGE IN MULTIGRAVIDA: ICD-10-CM

## 2022-07-18 PROCEDURE — 59025 FETAL NON-STRESS TEST: CPT

## 2022-07-21 ENCOUNTER — HOSPITAL ENCOUNTER (OUTPATIENT)
Facility: HOSPITAL | Age: 37
Discharge: HOME | End: 2022-07-21
Attending: OBSTETRICS & GYNECOLOGY | Admitting: OBSTETRICS & GYNECOLOGY
Payer: COMMERCIAL

## 2022-07-21 DIAGNOSIS — O09.529 AMA (ADVANCED MATERNAL AGE) MULTIGRAVIDA 35+: ICD-10-CM

## 2022-07-21 LAB — PRENATAL STREP B CULTURE: NEGATIVE

## 2022-07-21 PROCEDURE — 59025 FETAL NON-STRESS TEST: CPT

## 2022-07-25 ENCOUNTER — HOSPITAL ENCOUNTER (OUTPATIENT)
Facility: HOSPITAL | Age: 37
Discharge: HOME | End: 2022-07-25
Attending: OBSTETRICS & GYNECOLOGY | Admitting: OBSTETRICS & GYNECOLOGY
Payer: COMMERCIAL

## 2022-07-25 VITALS — DIASTOLIC BLOOD PRESSURE: 66 MMHG | HEART RATE: 96 BPM | SYSTOLIC BLOOD PRESSURE: 112 MMHG

## 2022-07-25 DIAGNOSIS — O09.529 AMA (ADVANCED MATERNAL AGE) MULTIGRAVIDA 35+: ICD-10-CM

## 2022-07-25 PROCEDURE — 59025 FETAL NON-STRESS TEST: CPT

## 2022-08-01 ENCOUNTER — HOSPITAL ENCOUNTER (OUTPATIENT)
Facility: HOSPITAL | Age: 37
Discharge: HOME | End: 2022-08-01
Attending: OBSTETRICS & GYNECOLOGY | Admitting: OBSTETRICS & GYNECOLOGY
Payer: COMMERCIAL

## 2022-08-01 VITALS — HEART RATE: 100 BPM | DIASTOLIC BLOOD PRESSURE: 62 MMHG | SYSTOLIC BLOOD PRESSURE: 118 MMHG

## 2022-08-01 DIAGNOSIS — O09.529 AMA (ADVANCED MATERNAL AGE) MULTIGRAVIDA 35+: ICD-10-CM

## 2022-08-01 PROCEDURE — 59025 FETAL NON-STRESS TEST: CPT | Mod: 59

## 2022-08-04 ENCOUNTER — HOSPITAL ENCOUNTER (OUTPATIENT)
Facility: HOSPITAL | Age: 37
Discharge: HOME | End: 2022-08-04
Attending: OBSTETRICS & GYNECOLOGY | Admitting: OBSTETRICS & GYNECOLOGY
Payer: COMMERCIAL

## 2022-08-04 VITALS — DIASTOLIC BLOOD PRESSURE: 68 MMHG | RESPIRATION RATE: 16 BRPM | SYSTOLIC BLOOD PRESSURE: 123 MMHG | HEART RATE: 100 BPM

## 2022-08-04 DIAGNOSIS — O09.529 ADVANCED MATERNAL AGE IN MULTIGRAVIDA: ICD-10-CM

## 2022-08-04 PROCEDURE — 59025 FETAL NON-STRESS TEST: CPT

## 2022-08-11 ENCOUNTER — HOSPITAL ENCOUNTER (INPATIENT)
Facility: HOSPITAL | Age: 37
LOS: 3 days | Discharge: HOME | End: 2022-08-14
Attending: OBSTETRICS & GYNECOLOGY | Admitting: OBSTETRICS & GYNECOLOGY
Payer: COMMERCIAL

## 2022-08-11 DIAGNOSIS — O36.5990 IUGR, ANTENATAL: Primary | ICD-10-CM

## 2022-08-11 DIAGNOSIS — O36.5930 POOR FETAL GROWTH AFFECTING MANAGEMENT OF MOTHER IN THIRD TRIMESTER: ICD-10-CM

## 2022-08-11 LAB
ABO + RH BLD: ABNORMAL
AMPHET+METHAMPHET UR QL: NEGATIVE
BACTERIA #/AREA URNS HPF: ABNORMAL /HPF
BARBITURATES UR QL SCN>200 NG/ML: NEGATIVE
BASOPHILS # BLD AUTO: 0.02 K/UL (ref 0–0.2)
BASOPHILS NFR BLD: 0.2 % (ref 0–1.9)
BENZODIAZ UR QL SCN>200 NG/ML: NEGATIVE
BILIRUB UR QL STRIP: NEGATIVE
BLD GP AB SCN CELLS X3 SERPL QL: ABNORMAL
BLOOD GROUP ANTIBODIES SERPL: NORMAL
BUPRENORPHINE UR QL: NEGATIVE
BZE UR QL SCN: NEGATIVE
CANNABINOIDS UR QL SCN: NEGATIVE
CLARITY UR: CLEAR
COLOR UR: YELLOW
CREAT UR-MCNC: 229 MG/DL (ref 15–325)
DIFFERENTIAL METHOD: ABNORMAL
EOSINOPHIL # BLD AUTO: 0 K/UL (ref 0–0.5)
EOSINOPHIL NFR BLD: 0.4 % (ref 0–8)
ERYTHROCYTE [DISTWIDTH] IN BLOOD BY AUTOMATED COUNT: 16.1 % (ref 11.5–14.5)
GLUCOSE UR QL STRIP: NEGATIVE
HCT VFR BLD AUTO: 29.5 % (ref 37–48.5)
HGB BLD-MCNC: 9.4 G/DL (ref 12–16)
HGB UR QL STRIP: NEGATIVE
HYALINE CASTS #/AREA URNS LPF: 26 /LPF
IMM GRANULOCYTES # BLD AUTO: 0.1 K/UL (ref 0–0.04)
IMM GRANULOCYTES NFR BLD AUTO: 1 % (ref 0–0.5)
KETONES UR QL STRIP: ABNORMAL
LEUKOCYTE ESTERASE UR QL STRIP: NEGATIVE
LYMPHOCYTES # BLD AUTO: 2.1 K/UL (ref 1–4.8)
LYMPHOCYTES NFR BLD: 20.9 % (ref 18–48)
MCH RBC QN AUTO: 27 PG (ref 27–31)
MCHC RBC AUTO-ENTMCNC: 31.9 G/DL (ref 32–36)
MCV RBC AUTO: 85 FL (ref 82–98)
MICROSCOPIC COMMENT: ABNORMAL
MONOCYTES # BLD AUTO: 0.7 K/UL (ref 0.3–1)
MONOCYTES NFR BLD: 7.3 % (ref 4–15)
NEUTROPHILS # BLD AUTO: 7 K/UL (ref 1.8–7.7)
NEUTROPHILS NFR BLD: 70.2 % (ref 38–73)
NITRITE UR QL STRIP: NEGATIVE
NRBC BLD-RTO: 0 /100 WBC
OPIATES UR QL SCN: NEGATIVE
PCP UR QL SCN>25 NG/ML: NEGATIVE
PH UR STRIP: 7 [PH] (ref 5–8)
PLATELET # BLD AUTO: 166 K/UL (ref 150–450)
PMV BLD AUTO: 12 FL (ref 9.2–12.9)
PROT UR QL STRIP: ABNORMAL
RBC # BLD AUTO: 3.48 M/UL (ref 4–5.4)
RBC #/AREA URNS HPF: 2 /HPF (ref 0–4)
SP GR UR STRIP: >1.03 (ref 1–1.03)
SQUAMOUS #/AREA URNS HPF: 8 /HPF
TOXICOLOGY INFORMATION: NORMAL
URN SPEC COLLECT METH UR: ABNORMAL
UROBILINOGEN UR STRIP-ACNC: ABNORMAL EU/DL
WBC # BLD AUTO: 9.94 K/UL (ref 3.9–12.7)
WBC #/AREA URNS HPF: 4 /HPF (ref 0–5)

## 2022-08-11 PROCEDURE — 25000003 PHARM REV CODE 250: Performed by: OBSTETRICS & GYNECOLOGY

## 2022-08-11 PROCEDURE — 86901 BLOOD TYPING SEROLOGIC RH(D): CPT | Performed by: OBSTETRICS & GYNECOLOGY

## 2022-08-11 PROCEDURE — 80307 DRUG TEST PRSMV CHEM ANLYZR: CPT | Performed by: OBSTETRICS & GYNECOLOGY

## 2022-08-11 PROCEDURE — 12000002 HC ACUTE/MED SURGE SEMI-PRIVATE ROOM

## 2022-08-11 PROCEDURE — 86870 RBC ANTIBODY IDENTIFICATION: CPT | Performed by: OBSTETRICS & GYNECOLOGY

## 2022-08-11 PROCEDURE — 85025 COMPLETE CBC W/AUTO DIFF WBC: CPT | Performed by: OBSTETRICS & GYNECOLOGY

## 2022-08-11 PROCEDURE — 63600175 PHARM REV CODE 636 W HCPCS: Performed by: OBSTETRICS & GYNECOLOGY

## 2022-08-11 PROCEDURE — 86922 COMPATIBILITY TEST ANTIGLOB: CPT | Performed by: OBSTETRICS & GYNECOLOGY

## 2022-08-11 PROCEDURE — 81001 URINALYSIS AUTO W/SCOPE: CPT | Performed by: OBSTETRICS & GYNECOLOGY

## 2022-08-11 PROCEDURE — 86592 SYPHILIS TEST NON-TREP QUAL: CPT | Performed by: OBSTETRICS & GYNECOLOGY

## 2022-08-11 RX ORDER — CALCIUM CARBONATE 200(500)MG
500 TABLET,CHEWABLE ORAL 3 TIMES DAILY PRN
Status: DISCONTINUED | OUTPATIENT
Start: 2022-08-11 | End: 2022-08-14 | Stop reason: HOSPADM

## 2022-08-11 RX ORDER — ONDANSETRON 2 MG/ML
4 INJECTION INTRAMUSCULAR; INTRAVENOUS EVERY 6 HOURS PRN
Status: DISCONTINUED | OUTPATIENT
Start: 2022-08-11 | End: 2022-08-12

## 2022-08-11 RX ORDER — SODIUM CHLORIDE, SODIUM LACTATE, POTASSIUM CHLORIDE, CALCIUM CHLORIDE 600; 310; 30; 20 MG/100ML; MG/100ML; MG/100ML; MG/100ML
INJECTION, SOLUTION INTRAVENOUS CONTINUOUS
Status: DISCONTINUED | OUTPATIENT
Start: 2022-08-11 | End: 2022-08-12

## 2022-08-11 RX ORDER — TERBUTALINE SULFATE 1 MG/ML
0.25 INJECTION SUBCUTANEOUS
Status: DISCONTINUED | OUTPATIENT
Start: 2022-08-11 | End: 2022-08-12

## 2022-08-11 RX ORDER — MISOPROSTOL 100 MCG
25 TABLET ORAL EVERY 4 HOURS PRN
Status: DISCONTINUED | OUTPATIENT
Start: 2022-08-11 | End: 2022-08-12

## 2022-08-11 RX ORDER — BUTORPHANOL TARTRATE 2 MG/ML
1 INJECTION INTRAMUSCULAR; INTRAVENOUS
Status: DISCONTINUED | OUTPATIENT
Start: 2022-08-11 | End: 2022-08-12

## 2022-08-11 RX ADMIN — Medication 25 MCG: at 06:08

## 2022-08-11 RX ADMIN — SODIUM CHLORIDE, SODIUM LACTATE, POTASSIUM CHLORIDE, AND CALCIUM CHLORIDE: .6; .31; .03; .02 INJECTION, SOLUTION INTRAVENOUS at 06:08

## 2022-08-11 RX ADMIN — Medication 25 MCG: at 10:08

## 2022-08-12 ENCOUNTER — ANESTHESIA EVENT (OUTPATIENT)
Dept: OBSTETRICS AND GYNECOLOGY | Facility: HOSPITAL | Age: 37
End: 2022-08-12
Payer: COMMERCIAL

## 2022-08-12 ENCOUNTER — ANESTHESIA (OUTPATIENT)
Dept: OBSTETRICS AND GYNECOLOGY | Facility: HOSPITAL | Age: 37
End: 2022-08-12
Payer: COMMERCIAL

## 2022-08-12 PROBLEM — O36.5930 POOR FETAL GROWTH AFFECTING MANAGEMENT OF MOTHER IN THIRD TRIMESTER: Status: ACTIVE | Noted: 2022-08-12

## 2022-08-12 PROBLEM — O36.5990 IUGR, ANTENATAL: Status: ACTIVE | Noted: 2022-08-12

## 2022-08-12 LAB — RPR SER QL: NORMAL

## 2022-08-12 PROCEDURE — 72200004 HC VAGINAL DELIVERY LEVEL I

## 2022-08-12 PROCEDURE — 27200710 HC EPIDURAL INFUSION PUMP SET: Performed by: ANESTHESIOLOGY

## 2022-08-12 PROCEDURE — 51702 INSERT TEMP BLADDER CATH: CPT

## 2022-08-12 PROCEDURE — 63600175 PHARM REV CODE 636 W HCPCS: Performed by: ANESTHESIOLOGY

## 2022-08-12 PROCEDURE — 25000003 PHARM REV CODE 250: Performed by: OBSTETRICS & GYNECOLOGY

## 2022-08-12 PROCEDURE — 12000002 HC ACUTE/MED SURGE SEMI-PRIVATE ROOM

## 2022-08-12 PROCEDURE — 25000003 PHARM REV CODE 250: Performed by: ANESTHESIOLOGY

## 2022-08-12 PROCEDURE — C1751 CATH, INF, PER/CENT/MIDLINE: HCPCS | Performed by: ANESTHESIOLOGY

## 2022-08-12 PROCEDURE — 63600175 PHARM REV CODE 636 W HCPCS: Performed by: OBSTETRICS & GYNECOLOGY

## 2022-08-12 PROCEDURE — 62326 NJX INTERLAMINAR LMBR/SAC: CPT | Performed by: ANESTHESIOLOGY

## 2022-08-12 RX ORDER — ROPIVACAINE HYDROCHLORIDE 2 MG/ML
INJECTION, SOLUTION EPIDURAL; INFILTRATION
Status: DISCONTINUED | OUTPATIENT
Start: 2022-08-12 | End: 2022-08-12

## 2022-08-12 RX ORDER — PRENATAL WITH FERROUS FUM AND FOLIC ACID 3080; 920; 120; 400; 22; 1.84; 3; 20; 10; 1; 12; 200; 27; 25; 2 [IU]/1; [IU]/1; MG/1; [IU]/1; MG/1; MG/1; MG/1; MG/1; MG/1; MG/1; UG/1; MG/1; MG/1; MG/1; MG/1
1 TABLET ORAL DAILY
Status: DISCONTINUED | OUTPATIENT
Start: 2022-08-13 | End: 2022-08-14 | Stop reason: HOSPADM

## 2022-08-12 RX ORDER — OXYCODONE AND ACETAMINOPHEN 5; 325 MG/1; MG/1
1 TABLET ORAL EVERY 4 HOURS PRN
Status: DISCONTINUED | OUTPATIENT
Start: 2022-08-12 | End: 2022-08-14 | Stop reason: HOSPADM

## 2022-08-12 RX ORDER — PROCHLORPERAZINE EDISYLATE 5 MG/ML
5 INJECTION INTRAMUSCULAR; INTRAVENOUS EVERY 6 HOURS PRN
Status: DISCONTINUED | OUTPATIENT
Start: 2022-08-12 | End: 2022-08-14 | Stop reason: HOSPADM

## 2022-08-12 RX ORDER — OXYTOCIN-SODIUM CHLORIDE 0.9% IV SOLN 30 UNIT/500ML 30-0.9/5 UT/ML-%
30 SOLUTION INTRAVENOUS ONCE
Status: DISCONTINUED | OUTPATIENT
Start: 2022-08-12 | End: 2022-08-14 | Stop reason: HOSPADM

## 2022-08-12 RX ORDER — DOCUSATE SODIUM 100 MG/1
200 CAPSULE, LIQUID FILLED ORAL 2 TIMES DAILY PRN
Status: DISCONTINUED | OUTPATIENT
Start: 2022-08-12 | End: 2022-08-14 | Stop reason: HOSPADM

## 2022-08-12 RX ORDER — SIMETHICONE 80 MG
1 TABLET,CHEWABLE ORAL EVERY 6 HOURS PRN
Status: DISCONTINUED | OUTPATIENT
Start: 2022-08-12 | End: 2022-08-14 | Stop reason: HOSPADM

## 2022-08-12 RX ORDER — OXYTOCIN-SODIUM CHLORIDE 0.9% IV SOLN 30 UNIT/500ML 30-0.9/5 UT/ML-%
0-30 SOLUTION INTRAVENOUS CONTINUOUS
Status: DISCONTINUED | OUTPATIENT
Start: 2022-08-12 | End: 2022-08-12

## 2022-08-12 RX ORDER — ACETAMINOPHEN 325 MG/1
650 TABLET ORAL EVERY 6 HOURS PRN
Status: DISCONTINUED | OUTPATIENT
Start: 2022-08-12 | End: 2022-08-14 | Stop reason: HOSPADM

## 2022-08-12 RX ORDER — IBUPROFEN 400 MG/1
800 TABLET ORAL EVERY 6 HOURS PRN
Status: DISCONTINUED | OUTPATIENT
Start: 2022-08-12 | End: 2022-08-14 | Stop reason: HOSPADM

## 2022-08-12 RX ORDER — DIPHENHYDRAMINE HCL 25 MG
25 CAPSULE ORAL EVERY 4 HOURS PRN
Status: DISCONTINUED | OUTPATIENT
Start: 2022-08-12 | End: 2022-08-14 | Stop reason: HOSPADM

## 2022-08-12 RX ORDER — EPHEDRINE SULFATE 50 MG/ML
10 INJECTION, SOLUTION INTRAVENOUS ONCE AS NEEDED
Status: DISCONTINUED | OUTPATIENT
Start: 2022-08-12 | End: 2022-08-12

## 2022-08-12 RX ORDER — ONDANSETRON 4 MG/1
8 TABLET, ORALLY DISINTEGRATING ORAL EVERY 8 HOURS PRN
Status: DISCONTINUED | OUTPATIENT
Start: 2022-08-12 | End: 2022-08-14 | Stop reason: HOSPADM

## 2022-08-12 RX ORDER — DIPHENHYDRAMINE HYDROCHLORIDE 50 MG/ML
12.5 INJECTION INTRAMUSCULAR; INTRAVENOUS EVERY 4 HOURS PRN
Status: DISCONTINUED | OUTPATIENT
Start: 2022-08-12 | End: 2022-08-12

## 2022-08-12 RX ORDER — AMOXICILLIN 250 MG
1 CAPSULE ORAL NIGHTLY
Status: DISCONTINUED | OUTPATIENT
Start: 2022-08-12 | End: 2022-08-14 | Stop reason: HOSPADM

## 2022-08-12 RX ORDER — ONDANSETRON 2 MG/ML
4 INJECTION INTRAMUSCULAR; INTRAVENOUS EVERY 6 HOURS PRN
Status: DISCONTINUED | OUTPATIENT
Start: 2022-08-12 | End: 2022-08-12

## 2022-08-12 RX ORDER — FENTANYL/BUPIVACAINE/NS/PF 2MCG/ML-.1
14 PLASTIC BAG, INJECTION (ML) INJECTION CONTINUOUS
Status: DISCONTINUED | OUTPATIENT
Start: 2022-08-12 | End: 2022-08-12

## 2022-08-12 RX ORDER — HYDROCORTISONE 25 MG/G
CREAM TOPICAL 3 TIMES DAILY PRN
Status: DISCONTINUED | OUTPATIENT
Start: 2022-08-12 | End: 2022-08-14 | Stop reason: HOSPADM

## 2022-08-12 RX ORDER — NALOXONE HCL 0.4 MG/ML
0.4 VIAL (ML) INJECTION SEE ADMIN INSTRUCTIONS
Status: DISCONTINUED | OUTPATIENT
Start: 2022-08-12 | End: 2022-08-12

## 2022-08-12 RX ORDER — OXYCODONE AND ACETAMINOPHEN 10; 325 MG/1; MG/1
1 TABLET ORAL EVERY 4 HOURS PRN
Status: DISCONTINUED | OUTPATIENT
Start: 2022-08-12 | End: 2022-08-14 | Stop reason: HOSPADM

## 2022-08-12 RX ADMIN — ROPIVACAINE HYDROCHLORIDE 3 ML: 2 INJECTION, SOLUTION EPIDURAL; INFILTRATION at 05:08

## 2022-08-12 RX ADMIN — IBUPROFEN 800 MG: 400 TABLET ORAL at 07:08

## 2022-08-12 RX ADMIN — BENZOCAINE AND LEVOMENTHOL: 200; 5 SPRAY TOPICAL at 10:08

## 2022-08-12 RX ADMIN — Medication 2 MILLI-UNITS/MIN: at 03:08

## 2022-08-12 RX ADMIN — SODIUM CHLORIDE, SODIUM LACTATE, POTASSIUM CHLORIDE, AND CALCIUM CHLORIDE: .6; .31; .03; .02 INJECTION, SOLUTION INTRAVENOUS at 09:08

## 2022-08-12 RX ADMIN — OXYCODONE AND ACETAMINOPHEN 1 TABLET: 10; 325 TABLET ORAL at 08:08

## 2022-08-12 RX ADMIN — SENNOSIDES AND DOCUSATE SODIUM 1 TABLET: 8.6; 5 TABLET ORAL at 10:08

## 2022-08-12 RX ADMIN — SODIUM CHLORIDE, SODIUM LACTATE, POTASSIUM CHLORIDE, AND CALCIUM CHLORIDE: .6; .31; .03; .02 INJECTION, SOLUTION INTRAVENOUS at 02:08

## 2022-08-12 RX ADMIN — SODIUM CHLORIDE, SODIUM LACTATE, POTASSIUM CHLORIDE, AND CALCIUM CHLORIDE: .6; .31; .03; .02 INJECTION, SOLUTION INTRAVENOUS at 05:08

## 2022-08-12 RX ADMIN — SODIUM CHLORIDE, SODIUM LACTATE, POTASSIUM CHLORIDE, AND CALCIUM CHLORIDE 1000 ML: .6; .31; .03; .02 INJECTION, SOLUTION INTRAVENOUS at 05:08

## 2022-08-12 RX ADMIN — Medication 10 ML/HR: at 05:08

## 2022-08-12 NOTE — NURSING
0244 Dr. Whipple notified of pt SROM clear fluid; SVE 2/50/-3 posterior and soft.  Category I tracing; contractions every 3-4 minutes; last dose of Cytotec given at 2230.  Orders to start Pitocin and pt may have epidural PRN noted.

## 2022-08-12 NOTE — PROGRESS NOTES
[facility]  Obstetrics  Labor Progress Note    Patient Name: Amara Reis  MRN: [id number]  Admission Date: 2022  Hospital Length of Stay: 1 days  Attending Physician: Ora Whipple MD  Primary Care Provider: Jama Burt MD    Subjective:     Principal Problem:Poor fetal growth affecting management of mother in third trimester    Hospital Course:  38yo  at 38.4 wks, IUGR with AC 3 wk lag. GBBS negative.  Starts with cytotec, then goes on to pitocin. Epidural.       Interval History:  Amara is a 37 y.o.  at 38w4d. She is doing well. SROM during the night. Cervix tight 4cm/75%/-3/vertex    Objective:     Vital Signs (Most Recent):  Temp: 97.8 °F (36.6 °C) (22)  Pulse: 82 (22)  Resp: 16 (22)  BP: 111/74 (22)  SpO2: 98 % (22) Vital Signs (24h Range):  Temp:  [97.3 °F (36.3 °C)-98.9 °F (37.2 °C)] 97.8 °F (36.6 °C)  Pulse:  [] 82  Resp:  [16-18] 16  SpO2:  [95 %-100 %] 98 %  BP: (102-136)/(54-91) 111/74     Weight: 73.5 kg (162 lb)  Body mass index is 31.64 kg/m².    FHT: 130's Cat 1 (reassuring)  TOCO:  Q 2-5 minutes         Significant Labs:  Lab Results   Component Value Date    GROUPTRH A POS 2022    HEPBSAG Negative 2022    STREPBCULT negative 2022       I have personallly reviewed all pertinent lab results from the last 24 hours.    Physical Exam    Assessment/Plan:     37 y.o. female  at 38w4d for:    * Poor fetal growth affecting management of mother in third trimester  IUP 38.4 wks  Good progress  Expect           Ora Whipple MD  Obstetrics  [facility]

## 2022-08-12 NOTE — ANESTHESIA PREPROCEDURE EVALUATION
08/12/2022  Amara Reis is a 37 y.o., female.      Patient Active Problem List   Diagnosis    Unspecified symptom associated with female genital organs    Ovarian cyst    Cystitis    Pelvic pain in female    Contraceptive management    Neck mass    Routine gynecological examination    Endometriosis    Encounter for gynecological examination without abnormal finding    Intramural leiomyoma of uterus    Breast discharge    PCOS (polycystic ovarian syndrome)    Female fertility problem    Uterine leiomyoma    Irregular menstrual cycle    Acne    Chronic vaginitis    Overdevelopment of nasal bones    Disorder of both eustachian tubes    Hypertrophy of nasal turbinates       Past Surgical History:   Procedure Laterality Date    EXCISION OF LESION OF NOSE Bilateral 2/18/2019    Procedure: EXCISION, LESION, NOSE;  Surgeon: Chadd Ward MD;  Location: [facility] OR;  Service: ENT;  Laterality: Bilateral;    LASER LAPAROSCOPY  07/01/2013    pt has had 3 surgies     MYOMECTOMY      MYRINGOTOMY WITH INSERTION OF VENTILATION TUBE Right 2/18/2019    Procedure: MYRINGOTOMY, WITH TYMPANOSTOMY TUBE INSERTION;  Surgeon: Chadd Ward MD;  Location: [facility] OR;  Service: ENT;  Laterality: Right;    CT EXPLORATORY OF ABDOMEN  2012    CT EXPLORATORY OF ABDOMEN  04/15/2014    VAGINAL DELIVERY      x2 normal        Tobacco Use:  The patient  reports that she has never smoked. She has never used smokeless tobacco.     Results for orders placed or performed during the hospital encounter of 09/09/21   EKG 12-lead    Collection Time: 09/09/21 10:17 AM    Narrative    Test Reason : R40.20,    Vent. Rate : 075 BPM     Atrial Rate : 075 BPM     P-R Int : 138 ms          QRS Dur : 078 ms      QT Int : 390 ms       P-R-T Axes : 021 003 006 degrees     QTc Int : 435 ms    Normal sinus rhythm  Normal ECG  No  previous ECGs available  Confirmed by Baron GHOSH, Nabil MAURER ([id number]) on 9/9/2021 4:28:04 PM    Referred By: [email or web address]   SELF           Confirmed By:Nabil Draper MD             Lab Results   Component Value Date    WBC 9.94 08/11/2022    HGB 9.4 (L) 08/11/2022    HCT 29.5 (L) 08/11/2022    MCV 85 08/11/2022     08/11/2022     BMPNo results found for this or any previous visit.     08/11/22 17:49   Group & Rh A POS   INDIRECT CHERRI POS !   Antibody ID POS   !: Data is abnormal    Pre-op Assessment    I have reviewed the Patient Summary Reports.     I have reviewed the Nursing Notes. I have reviewed the NPO Status.   I have reviewed the Medications.     Review of Systems  Anesthesia Hx:  No problems with previous Anesthesia Denies Hx of Anesthetic complications  Denies Family Hx of Anesthesia complications.   Denies Personal Hx of Anesthesia complications.   Social:  No Alcohol Use, Non-Smoker    Hematology/Oncology:     Oncology Normal    -- Anemia:   EENT/Dental:EENT/Dental Normal   Cardiovascular:  Cardiovascular Normal     Pulmonary:   Sleep Apnea    Education provided regarding risk of obstructive sleep apnea     Renal/:  Renal/ Normal     Hepatic/GI:  Hepatic/GI Normal    Musculoskeletal:  Musculoskeletal Normal    Neurological:  Neurology Normal    Endocrine:  Endocrine Normal    Dermatological:  Skin Normal    Psych:  Psychiatric Normal           Physical Exam  General: Well nourished, Cooperative, Alert and Oriented    Airway:  Mallampati: III   Mouth Opening: Normal  TM Distance: Normal  Tongue: Normal  Neck ROM: Normal ROM    Dental:  Intact    Chest/Lungs:  Clear to auscultation, Normal Respiratory Rate    Heart:  Rate: Normal  Rhythm: Regular Rhythm  Sounds: Normal        Anesthesia Plan  Type of Anesthesia, risks & benefits discussed:    Anesthesia Type: Epidural  Intra-op Monitoring Plan: Standard ASA Monitors  Informed Consent: Informed consent signed with the Patient and all  parties understand the risks and agree with anesthesia plan.  All questions answered. Patient consented to blood products? Yes  ASA Score: 3  Anesthesia Plan Notes:       Labor Epidural    Ready For Surgery From Anesthesia Perspective.     .

## 2022-08-12 NOTE — NURSING
0634 Dr. Whipple notified pt has epidural is comfortable, last SVE 3/70/-2 midposition.  Orders to continue with current plan of care noted.

## 2022-08-12 NOTE — HOSPITAL COURSE
36yo  at 38.4 wks, IUGR with AC 3 wk lag. GBBS negative.  Starts with cytotec, then goes on to pitocin. Epidural. She underwent  of viable female infant. Apgars 8/8.

## 2022-08-12 NOTE — SUBJECTIVE & OBJECTIVE
Interval History:  Amara is a 37 y.o.  at 38w4d. She is doing well. SROM during the night. Cervix tight 4cm/75%/-3/vertex    Objective:     Vital Signs (Most Recent):  Temp: 97.8 °F (36.6 °C) (22)  Pulse: 82 (22 0742)  Resp: 16 (22)  BP: 111/74 (22)  SpO2: 98 % (22) Vital Signs (24h Range):  Temp:  [97.3 °F (36.3 °C)-98.9 °F (37.2 °C)] 97.8 °F (36.6 °C)  Pulse:  [] 82  Resp:  [16-18] 16  SpO2:  [95 %-100 %] 98 %  BP: (102-136)/(54-91) 111/74     Weight: 73.5 kg (162 lb)  Body mass index is 31.64 kg/m².    FHT: 130's Cat 1 (reassuring)  TOCO:  Q 2-5 minutes         Significant Labs:  Lab Results   Component Value Date    GROUPTRH A POS 2022    HEPBSAG Negative 2022    STREPBCULT negative 2022       I have personallly reviewed all pertinent lab results from the last 24 hours.    Physical Exam

## 2022-08-12 NOTE — ANESTHESIA PROCEDURE NOTES
Epidural    Patient location during procedure: OB   Reason for block: primary anesthetic   Reason for block: labor analgesia requested by patient and obstetrician  Diagnosis: IUP   Start time: 8/12/2022 5:40 AM  Timeout: 8/12/2022 5:27 AM  End time: 8/12/2022 5:50 AM    Staffing  Performing Provider: Elver Ramos MD  Authorizing Provider: Elver Ramos MD        Preanesthetic Checklist  Completed: patient identified, IV checked, site marked, risks and benefits discussed, surgical consent, monitors and equipment checked, pre-op evaluation, timeout performed, anesthesia consent given, hand hygiene performed and patient being monitored  Preparation  Patient position: sitting  Prep: Betadine  Patient monitoring: ECG, Pulse Ox and Blood Pressure  Reason for block: primary anesthetic   Epidural  Skin Anesthetic: lidocaine 1%  Skin Wheal: 5 mL  Administration type: continuous  Interspace: L3-4    Injection technique: VINICIO air  Needle and Epidural Catheter  Needle type: Tuohy   Needle gauge: 17  Needle length: 3.5 inches  Needle insertion depth: 7 cm  Catheter type: multi-orifice and springwound  Catheter size: 19 G  Catheter at skin depth: 12 cm  Insertion Attempts: 2  Test dose: 3 mL of lidocaine 1.5% with Epi 1-to-200,000  Additional Documentation: incremental injection, negative aspiration for heme and CSF, no paresthesia on injection, no signs/symptoms of IV or SA injection, no significant pain on injection and no significant complaints from patient  Needle localization: anatomical landmarks  Medications:  Volume per aspiration: 3 mL  Time between aspirations: 1 minutes   Assessment  Upper dermatomal levels - Left: T10  Right: T10   Dermatomal levels determined by alcohol wipe  Ease of block: easy  Patient's tolerance of the procedure: comfortable throughout block and no complaints  Additional Notes  The patient was ID and examined with chart and labs reviewed. The risk benefits alternatives to the epidural were  "discussed with the patient with all questions answered and the patient asked if she desired the epidural and she responded "yes". The consents were signed and a timeout performed.      No inadvertent dural puncture with Tuohy.  Dural puncture not performed with spinal needle            "

## 2022-08-12 NOTE — NURSING
1830 Report received assumed care.  1908  Pt lying in bed no acute distress noted.  Pt states+FM; denies loss of fluid; or vaginal bleeding at this time. Pt states having irregular mild contractions rating them a 2/10 on a pain scale.  Assessment done per flow.  Breath sounds clear bilaterally; apical pulse strong and regular; bowel sounds active X 4 quadrants.  Pt AAO x3 call bell in reach.  IV to LFA intact without redness or swelling infusing LR on a pump @ 125cc/hour without difficulty.  Plan of care discussed with pt.  Pt voiced understanding and consent.  Pt denies needs at this time.  Pt instructed to call for any and all needs.

## 2022-08-12 NOTE — L&D DELIVERY NOTE
Pending sale to [facility]  Vaginal Delivery   Operative Note    SUMMARY     Patient got to complete dilation with a good working epidural.  No episiotomy was performed.  The baby was in the occiput anterior presentation.  After the head delivered, there was a nuchal cord x1 that was reduced, then the shoulders cleared easily, followed by the rest of the infant, and is a viable female infant.  Baby had Apgars of 8 and 8. After the baby delivered, the nares and mouth were then suctioned, then she was handed off to the mom.  Once the cord stopped pulsating, it was clamped by me and then cut by the bruce budrick.  Cord blood was obtained for the nursery.  The placenta delivered in the Schultze presentation was complete and intact.  The uterus was swept and there are no retained placenta found.  The perineum was inspected and was intact, but there was a very small labia minora tear but the 1 o'clock position that required a figure-of-eight of 3-0 chromic.  Blood loss about 250 cc.   Patient tolerated delivery well. Sponge needle and lap counted correctly x2, and counted by me.    Indications: Poor fetal growth affecting management of mother in third trimester  Pregnancy complicated by:   Patient Active Problem List   Diagnosis    Unspecified symptom associated with female genital organs    Ovarian cyst    Cystitis    Pelvic pain in female    Contraceptive management    Neck mass    Routine gynecological examination    Endometriosis    Encounter for gynecological examination without abnormal finding    Intramural leiomyoma of uterus    Breast discharge    PCOS (polycystic ovarian syndrome)    Female fertility problem    Uterine leiomyoma    Irregular menstrual cycle    Acne    Chronic vaginitis    Overdevelopment of nasal bones    Disorder of both eustachian tubes    Hypertrophy of nasal turbinates    Poor fetal growth affecting management of mother in third trimester    IUGR,      Admitting  GA: 38w4d    Delivery Information for Prabhu Reis    Birth information:  YOB: 2022   Time of birth: 2:59 PM   Sex: female   Head Delivery Date/Time:     Delivery type:    Gestational Age: 38w4d    Delivery Providers    Delivering clinician:            Measurements    Weight:   Length:          Apgars    Living status:   Apgars:  1 min.:  5 min.:  10 min.:  15 min.:  20 min.:    Skin color:         Heart rate:         Reflex irritability:         Muscle tone:         Respiratory effort:         Total:                                Interventions/Resuscitation         Cord    No data filed       Placenta    Placenta delivery date/time:   Placenta removal:            Labor Events:       labor: No     Labor Onset Date/Time:         Dilation Complete Date/Time:         Start Pushing Date/Time:         Start Pushing Date/Time:       Rupture Date/Time: 22         Rupture type: SRM (Spontaneous Rupture)         Fluid Amount:       Fluid Color: Clear               steroids: None     Antibiotics given for GBS: No     Induction: misoprostol     Indications for induction:  Intrauterine Growth Restriction     Augmentation:       Indications for augmentation:       Labor complications:       Additional complications:          Cervical ripening:                     Delivery:      Episiotomy:       Indication for Episiotomy:       Perineal Lacerations:   Repaired:      Periurethral Laceration:   Repaired:     Labial Laceration:   Repaired:     Sulcus Laceration:   Repaired:     Vaginal Laceration:   Repaired:     Cervical Laceration:   Repaired:     Repair suture:       Repair # of packets:       Last Value - EBL - Nursing (mL):       Sum - EBL - Nursing (mL): 0     Last Value - EBL - Anesthesia (mL):      Calculated QBL (mL):       Vaginal Sweep Performed:       Surgicount Correct:         Other providers:            Details (if applicable):  Trial of Labor       Categorization:      Priority:     Indications for :     Incision Type:       Additional  information:  Forceps:    Vacuum:    Breech:    Observed anomalies    Other (Comments):

## 2022-08-13 LAB
BASOPHILS # BLD AUTO: 0.03 K/UL (ref 0–0.2)
BASOPHILS NFR BLD: 0.2 % (ref 0–1.9)
DIFFERENTIAL METHOD: ABNORMAL
EOSINOPHIL # BLD AUTO: 0.1 K/UL (ref 0–0.5)
EOSINOPHIL NFR BLD: 0.5 % (ref 0–8)
ERYTHROCYTE [DISTWIDTH] IN BLOOD BY AUTOMATED COUNT: 15.8 % (ref 11.5–14.5)
HCT VFR BLD AUTO: 28.5 % (ref 37–48.5)
HGB BLD-MCNC: 9.2 G/DL (ref 12–16)
IMM GRANULOCYTES # BLD AUTO: 0.08 K/UL (ref 0–0.04)
IMM GRANULOCYTES NFR BLD AUTO: 0.6 % (ref 0–0.5)
LYMPHOCYTES # BLD AUTO: 2 K/UL (ref 1–4.8)
LYMPHOCYTES NFR BLD: 15.4 % (ref 18–48)
MCH RBC QN AUTO: 27.2 PG (ref 27–31)
MCHC RBC AUTO-ENTMCNC: 32.3 G/DL (ref 32–36)
MCV RBC AUTO: 84 FL (ref 82–98)
MONOCYTES # BLD AUTO: 0.7 K/UL (ref 0.3–1)
MONOCYTES NFR BLD: 5.8 % (ref 4–15)
NEUTROPHILS # BLD AUTO: 9.8 K/UL (ref 1.8–7.7)
NEUTROPHILS NFR BLD: 77.5 % (ref 38–73)
NRBC BLD-RTO: 0 /100 WBC
PLATELET # BLD AUTO: 191 K/UL (ref 150–450)
PMV BLD AUTO: 10.4 FL (ref 9.2–12.9)
RBC # BLD AUTO: 3.38 M/UL (ref 4–5.4)
WBC # BLD AUTO: 12.7 K/UL (ref 3.9–12.7)

## 2022-08-13 PROCEDURE — 36415 COLL VENOUS BLD VENIPUNCTURE: CPT | Performed by: OBSTETRICS & GYNECOLOGY

## 2022-08-13 PROCEDURE — 85025 COMPLETE CBC W/AUTO DIFF WBC: CPT | Performed by: OBSTETRICS & GYNECOLOGY

## 2022-08-13 PROCEDURE — 12000002 HC ACUTE/MED SURGE SEMI-PRIVATE ROOM

## 2022-08-13 PROCEDURE — 25000003 PHARM REV CODE 250: Performed by: OBSTETRICS & GYNECOLOGY

## 2022-08-13 RX ORDER — HYDROCORTISONE 25 MG/G
CREAM TOPICAL 2 TIMES DAILY
Status: DISCONTINUED | OUTPATIENT
Start: 2022-08-13 | End: 2022-08-14 | Stop reason: HOSPADM

## 2022-08-13 RX ADMIN — OXYCODONE AND ACETAMINOPHEN 1 TABLET: 10; 325 TABLET ORAL at 01:08

## 2022-08-13 RX ADMIN — HYDROCORTISONE: 25 CREAM TOPICAL at 08:08

## 2022-08-13 RX ADMIN — IBUPROFEN 800 MG: 400 TABLET ORAL at 01:08

## 2022-08-13 RX ADMIN — DOCUSATE SODIUM 200 MG: 100 CAPSULE, LIQUID FILLED ORAL at 08:08

## 2022-08-13 RX ADMIN — OXYCODONE AND ACETAMINOPHEN 1 TABLET: 10; 325 TABLET ORAL at 05:08

## 2022-08-13 RX ADMIN — IBUPROFEN 800 MG: 400 TABLET ORAL at 03:08

## 2022-08-13 RX ADMIN — PRENATAL VIT W/ FE FUMARATE-FA TAB 27-0.8 MG 1 TABLET: 27-0.8 TAB at 08:08

## 2022-08-13 RX ADMIN — HYDROCORTISONE: 25 CREAM TOPICAL at 11:08

## 2022-08-13 RX ADMIN — OXYCODONE AND ACETAMINOPHEN 1 TABLET: 10; 325 TABLET ORAL at 09:08

## 2022-08-13 RX ADMIN — SENNOSIDES AND DOCUSATE SODIUM 1 TABLET: 8.6; 5 TABLET ORAL at 11:08

## 2022-08-13 NOTE — LACTATION NOTE
08/13/22 1400   Maternal Assessment   Breast Density Bilateral:;soft   Areola Bilateral:;elastic   Nipples Bilateral:;everted   Left Nipple Symptoms redness;cracked;painful   Right Nipple Symptoms redness;tender   Maternal Infant Feeding   Maternal Emotional State assist needed   Infant Positioning clutch/football   Signs of Milk Transfer audible swallow;infant jaw motion present   Pain with Feeding yes   Pain Location nipples, bilateral   Comfort Measures Before/During Feeding infant position adjusted;latch adjusted;maternal position adjusted   Latch Assistance yes     Assisted to latch baby to left breast in football position. Nipple is red, cracked and tender. Baby chomping and tongue thrusting at breast. Mother complains of nipple pain during feeding. Able to get baby to drop tongue and suck on gloved finger. Moved baby to right breast in football position. Baby latched deeply, nursing well with audible swallows. Mother states pain nipple pain eases during feeding with deep latch. Reviewed basic breastfeeding instructions and emphasized importance of deep latch to prevent further nipple damage. Provided gel pads to promote nipple healing. Encouraged to call me for any further breastfeeding assistance. Patient verbalizes understanding of all instructions with good recall.    Instructed on proper latch to facilitate effective breastfeeding.  Discussed recognizing hunger cues, appropriate positioning and wide mouth latch.  Discussed ways to determine an effective latch including:  areola included in latch, rhythmic/nutritive sucking and audible swallowing.  Also discussed soreness/tenderness associated with latch and prevention and treatment.  Pt states understanding and verbalized appropriate recall.

## 2022-08-13 NOTE — PLAN OF CARE
Patient educated on bleeding , clots , when to call nurse ,  VSS, lochia wdl, voids without complication,pain controlled with prn pain medication

## 2022-08-13 NOTE — PROGRESS NOTES
[facility]  Obstetrics  Postpartum Progress Note    Patient Name: Amara Reis  MRN: [id number]  Admission Date: 2022  Hospital Length of Stay: 2 days  Attending Physician: Ora Whipple MD  Primary Care Provider: Jama Burt MD    Subjective:     Principal Problem:Poor fetal growth affecting management of mother in third trimester    Hospital Course:  36yo  at 38.4 wks, IUGR with AC 3 wk lag. GBBS negative.  Starts with cytotec, then goes on to pitocin. Epidural. She underwent  of viable female infant. Apgars 8/8.      Interval History: PPD #1. C/o hemorrhoid pin.    She is doing well this morning. She is tolerating a regular diet without nausea or vomiting. She is voiding spontaneously. She is ambulating. She has not passed flatus, and has not a BM. Vaginal bleeding is mild. She denies fever or chills. Abdominal pain is mild and controlled with oral medications. She Is breastfeeding. She desires circumcision for her male baby: not applicable.    Objective:     Vital Signs (Most Recent):  Temp: 98.1 °F (36.7 °C) (22)  Pulse: 77 (22)  Resp: 16 (22)  BP: 114/75 (22)  SpO2: 97 % (22) Vital Signs (24h Range):  Temp:  [97.6 °F (36.4 °C)-98.6 °F (37 °C)] 98.1 °F (36.7 °C)  Pulse:  [75-93] 77  Resp:  [16-18] 16  SpO2:  [97 %-99 %] 97 %  BP: (107-136)/(61-86) 114/75     Weight: 73.5 kg (162 lb)  Body mass index is 31.64 kg/m².      Intake/Output Summary (Last 24 hours) at 2022 0901  Last data filed at 2022 0443  Gross per 24 hour   Intake 2902.08 ml   Output 3249 ml   Net -346.92 ml         Significant Labs:  Lab Results   Component Value Date    GROUPTRH A POS 2022    HEPBSAG Negative 2022    STREPBCULT negative 2022     Recent Labs   Lab 22  0302   HGB 9.2*   HCT 28.5*       I have personallly reviewed all pertinent lab results from the last 24 hours.    Physical Exam    Assessment/Plan:      37 y.o. female  for:    * Poor fetal growth affecting management of mother in third trimester  PPD #1   Breast feeding  hemorrhoid pian  Home tomorrow        Disposition: As patient meets milestones, will plan to discharge tomorrow.    Ora Whipple MD  Obstetrics  [facility]

## 2022-08-13 NOTE — ANESTHESIA POSTPROCEDURE EVALUATION
Anesthesia Post Evaluation    Patient: Amara Reis    Procedure(s) Performed: * No procedures listed *    Final Anesthesia Type: epidural      Patient location during evaluation: floor  Patient participation: Yes- Able to Participate  Level of consciousness: awake and alert, oriented and awake  Post-procedure vital signs: reviewed and stable  Pain management: adequate  Airway patency: patent    PONV status at discharge: No PONV  Anesthetic complications: no      Cardiovascular status: blood pressure returned to baseline, hemodynamically stable and stable  Respiratory status: unassisted, spontaneous ventilation and room air  Hydration status: euvolemic  Follow-up not needed.  Comments: The patient was found to be awake alert and oriented x4 without evidence or sedation, confusion or respiratory depression at this time.  The patient states that her legs feel normal and has been able to ambulate well without difficulty.  She was able to demonstrate good motor and sensory to her lower extremities at this time.  The patient is without headache or lilly back pain at this time.  The patient experiences minor insertion site discomfort which is normal and expected.  The epidural site was examined and found to be clean and dry without evidence of bleeding, infection or hematoma formation.  The patient was instructed to notify the Department of Anesthesiology with any questions, problems or concerns.  The patient demonstrates no anesthesia complications at this time.          Vitals Value Taken Time   /79 08/13/22 0356   Temp 36.8 °C (98.3 °F) 08/13/22 0356   Pulse 78 08/13/22 0356   Resp 17 08/13/22 0356   SpO2 99 % 08/13/22 0356         No case tracking events are documented in the log.      Pain/Lavonne Score: Pain Rating Prior to Med Admin: 6 (8/13/2022  3:56 AM)  Pain Rating Post Med Admin: 0 (8/12/2022  9:00 PM)  Lavonne Score: 10 (8/12/2022  9:00 PM)

## 2022-08-13 NOTE — NURSING
1830 Report received assumed care.  1905 Pt lying in bed no acute distress noted.  Pt states 6/10 pain requesting something for pain.  Motrin 800mg p.o. given for c/o pain.  Assessment done per flow.  Breath sounds clear bilaterally; apical pulse strong and regular; bowel sounds active X 4 quadrants.  Pt AAO x3 call bell in reach.  FF deviated to rt at umbilicus; small amount of rubra noted.  Mayers draining clear yellow urine to gravity without difficulty.  IV to LFA intact without redness or swelling infusing Pitocin @ 100cc/hour per pump without difficulty.  Plan of care discussed with pt. Pt voiced understanding and consent.  Pt denies needs at this time.  Pt instructed to call for any and all needs.

## 2022-08-13 NOTE — PLAN OF CARE
22 0924   OB SCREEN   Assessment Type Discharge Planning Assessment   Source of Information patient;health record   Received Prenatal Care Yes   Any indications/suspicions for None   Is this a teen pregnancy No   Is the baby in NICU No   Indication for adoption/Safe Haven No   Indication for DME/post-acute needs No   HIV (+) No   Any congenital  disorders No   Fetal demise/ death No

## 2022-08-13 NOTE — SUBJECTIVE & OBJECTIVE
Interval History: PPD #1. C/o hemorrhoid pin.    She is doing well this morning. She is tolerating a regular diet without nausea or vomiting. She is voiding spontaneously. She is ambulating. She has not passed flatus, and has not a BM. Vaginal bleeding is mild. She denies fever or chills. Abdominal pain is mild and controlled with oral medications. She Is breastfeeding. She desires circumcision for her male baby: not applicable.    Objective:     Vital Signs (Most Recent):  Temp: 98.1 °F (36.7 °C) (08/13/22 0730)  Pulse: 77 (08/13/22 0730)  Resp: 16 (08/13/22 0730)  BP: 114/75 (08/13/22 0730)  SpO2: 97 % (08/13/22 0730) Vital Signs (24h Range):  Temp:  [97.6 °F (36.4 °C)-98.6 °F (37 °C)] 98.1 °F (36.7 °C)  Pulse:  [75-93] 77  Resp:  [16-18] 16  SpO2:  [97 %-99 %] 97 %  BP: (107-136)/(61-86) 114/75     Weight: 73.5 kg (162 lb)  Body mass index is 31.64 kg/m².      Intake/Output Summary (Last 24 hours) at 8/13/2022 0901  Last data filed at 8/13/2022 0443  Gross per 24 hour   Intake 2902.08 ml   Output 3249 ml   Net -346.92 ml         Significant Labs:  Lab Results   Component Value Date    GROUPTRH A POS 08/11/2022    HEPBSAG Negative 01/12/2022    STREPBCULT negative 07/21/2022     Recent Labs   Lab 08/13/22  0302   HGB 9.2*   HCT 28.5*       I have personallly reviewed all pertinent lab results from the last 24 hours.    Physical Exam

## 2022-08-13 NOTE — NURSING
2123 Mayers removed catheter tip intact 600 cc clear yellow noted, pt tolerated well.  Pt up to bathroom voided 50cc pericare done pads changed pt tolerated well.   2200 pt transferred via w/c from LDR2 to 2106 orientated to room and surroundings.  Report to DEMETRIA Wynn RN postpartum nurse care handed over.

## 2022-08-13 NOTE — PLAN OF CARE
Assessment completed: at bedside with mother     Address mother and baby will discharge home to: [address] [place] [address]    History of Substance Abuse issues:  Mother denies                Assistive Treatment Programs or Medications?  Mother denies    History of Mental Health issues:  Mother denies    History of Domestic Violence:  Mother denies       08/13/22 0971   Discharge Planning   Assessment Type Discharge Planning Assessment   Resource/Environmental Concerns none   Support Systems Spouse/significant other;Children   Equipment Currently Used at Home none   Current Living Arrangements home/apartment/condo   Patient/Family Anticipates Transition to home with family   Patient/Family Anticipated Services at Transition none   DME Needed Upon Discharge  none   Discharge Plan A Home with family   Discharge Plan B Home with family

## 2022-08-14 VITALS
HEIGHT: 60 IN | SYSTOLIC BLOOD PRESSURE: 142 MMHG | WEIGHT: 162 LBS | OXYGEN SATURATION: 99 % | DIASTOLIC BLOOD PRESSURE: 86 MMHG | BODY MASS INDEX: 31.8 KG/M2 | RESPIRATION RATE: 16 BRPM | HEART RATE: 101 BPM | TEMPERATURE: 99 F

## 2022-08-14 PROCEDURE — 63600175 PHARM REV CODE 636 W HCPCS: Performed by: OBSTETRICS & GYNECOLOGY

## 2022-08-14 PROCEDURE — 90715 TDAP VACCINE 7 YRS/> IM: CPT | Performed by: OBSTETRICS & GYNECOLOGY

## 2022-08-14 PROCEDURE — 90471 IMMUNIZATION ADMIN: CPT | Performed by: OBSTETRICS & GYNECOLOGY

## 2022-08-14 PROCEDURE — 25000003 PHARM REV CODE 250: Performed by: OBSTETRICS & GYNECOLOGY

## 2022-08-14 RX ORDER — OXYCODONE AND ACETAMINOPHEN 5; 325 MG/1; MG/1
1 TABLET ORAL EVERY 6 HOURS PRN
Qty: 14 TABLET | Refills: 0 | Status: SHIPPED | OUTPATIENT
Start: 2022-08-14 | End: 2023-06-06

## 2022-08-14 RX ORDER — IBUPROFEN 800 MG/1
800 TABLET ORAL EVERY 6 HOURS PRN
Qty: 14 TABLET | Refills: 0 | Status: SHIPPED | OUTPATIENT
Start: 2022-08-14 | End: 2023-06-06

## 2022-08-14 RX ADMIN — PRENATAL VIT W/ FE FUMARATE-FA TAB 27-0.8 MG 1 TABLET: 27-0.8 TAB at 08:08

## 2022-08-14 RX ADMIN — OXYCODONE AND ACETAMINOPHEN 1 TABLET: 10; 325 TABLET ORAL at 08:08

## 2022-08-14 RX ADMIN — DOCUSATE SODIUM 200 MG: 100 CAPSULE, LIQUID FILLED ORAL at 08:08

## 2022-08-14 RX ADMIN — IBUPROFEN 800 MG: 400 TABLET ORAL at 12:08

## 2022-08-14 RX ADMIN — OXYCODONE AND ACETAMINOPHEN 1 TABLET: 10; 325 TABLET ORAL at 11:08

## 2022-08-14 RX ADMIN — OXYCODONE AND ACETAMINOPHEN 1 TABLET: 10; 325 TABLET ORAL at 04:08

## 2022-08-14 RX ADMIN — CLOSTRIDIUM TETANI TOXOID ANTIGEN (FORMALDEHYDE INACTIVATED), CORYNEBACTERIUM DIPHTHERIAE TOXOID ANTIGEN (FORMALDEHYDE INACTIVATED), BORDETELLA PERTUSSIS TOXOID ANTIGEN (GLUTARALDEHYDE INACTIVATED), BORDETELLA PERTUSSIS FILAMENTOUS HEMAGGLUTININ ANTIGEN (FORMALDEHYDE INACTIVATED), BORDETELLA PERTUSSIS PERTACTIN ANTIGEN, AND BORDETELLA PERTUSSIS FIMBRIAE 2/3 ANTIGEN 0.5 ML: 5; 2; 2.5; 5; 3; 5 INJECTION, SUSPENSION INTRAMUSCULAR at 12:08

## 2022-08-14 RX ADMIN — OXYCODONE AND ACETAMINOPHEN 1 TABLET: 10; 325 TABLET ORAL at 12:08

## 2022-08-14 NOTE — DISCHARGE SUMMARY
Replaced by [facility]  Obstetrics  Discharge Summary      Patient Name: Amara Reis  MRN: [id number]  Admission Date: 2022  Hospital Length of Stay: 3 days  Discharge Date and Time:  2022 11:31 AM  Attending Physician: Ora Whipple MD   Discharging Provider: Ora Whipple MD   Primary Care Provider: Jama Burt MD    HPI: No notes on file      Hospital Course:   38yo  at 38.4 wks, IUGR with AC 3 wk lag. GBBS negative.  Starts with cytotec, then goes on to pitocin. Epidural. She underwent  of viable female infant. Apgars 8/8.     She is PPD #2, bleeding light and ready for discharge.      Final Active Diagnoses:    Diagnosis Date Noted POA    PRINCIPAL PROBLEM:  Poor fetal growth affecting management of mother in third trimester [O36.5930] 2022 Yes    IUGR,  [O36.5990] 2022 Yes      Problems Resolved During this Admission:        Significant Diagnostic Studies: Labs:   CBC   Recent Labs   Lab 22  0302   WBC 12.70   HGB 9.2*   HCT 28.5*        Lab Results   Component Value Date    GROUPTRH A POS 2022         Feeding Method: bottle    Immunizations     Date Immunization Status Dose Route/Site Given by    22 MMR Incomplete 0.5 mL Subcutaneous/     22 Tdap Incomplete 0.5 mL Intramuscular/           Delivery:    Episiotomy: None   Lacerations: None   Repair suture:     Repair # of packets: 1   Blood loss (ml):       Birth information:  YOB: 2022   Time of birth: 2:59 PM   Sex: female   Delivery type: Vaginal, Spontaneous   Gestational Age: 38w4d    Delivery Clinician:      Other providers:       Additional  information:  Forceps:    Vacuum:    Breech:    Observed anomalies      Living?:           APGARS  One minute Five minutes Ten minutes   Skin color:         Heart rate:         Grimace:         Muscle tone:         Breathing:         Totals: 8  8        Placenta: Delivered:       appearance    Pending  Diagnostic Studies:     None          Discharged Condition: good    Disposition: Home or Self Care    Follow Up:   Follow-up Information     Ora Whipple MD. Schedule an appointment as soon as possible for a visit in 6 week(s).    Specialties: Obstetrics, Obstetrics and Gynecology, Maternal and Fetal Medicine  Why: postpartum follow up  Contact information:  [address] [facility]  [address]  [facility]  [place] [address]  [phone]                       Patient Instructions:      Diet general     Pelvic Rest     Lifting restrictions     Call MD for:  temperature >100.4     Call MD for:  persistent nausea and vomiting     Call MD for:  severe uncontrolled pain     Call MD for:  difficulty breathing, headache or visual disturbances     Call MD for:  redness, tenderness, or signs of infection (pain, swelling, redness, odor or green/yellow discharge around incision site)     Call MD for:  hives     Call MD for:  persistent dizziness or light-headedness     Call MD for:  extreme fatigue     Call MD for:   Scheduling Instructions: 1. vaginal bleeding to fill a peripad in less than an hour or passing clots larger than a golf ball   2. Thought of harming yourself or your baby.     Activity as tolerated     Weight bearing restrictions (specify)   Order Comments: add 10 pounds or weight of baby     Medications:  Current Discharge Medication List      START taking these medications    Details   ibuprofen (ADVIL,MOTRIN) 800 MG tablet Take 1 tablet (800 mg total) by mouth every 6 (six) hours as needed (cramping).  Qty: 14 tablet, Refills: 0      oxyCODONE-acetaminophen (PERCOCET) 5-325 mg per tablet Take 1 tablet by mouth every 6 (six) hours as needed.  Qty: 14 tablet, Refills: 0    Comments: Quantity prescribed more than 7 day supply? No         CONTINUE these medications which have NOT CHANGED    Details   cetirizine (ZYRTEC) 10 MG tablet Take 1 tablet (10 mg total) by mouth once daily.  Qty: 30  tablet, Refills: 0      ciprofloxacin-dexamethasone 0.3-0.1% (CIPRODEX) 0.3-0.1 % DrpS Place 4 drops into the right ear 2 (two) times daily.  Qty: 7.5 mL, Refills: 0      clindamycin (CLEOCIN) 300 MG capsule       dexchlorphen-phenylephrine-DM (POLYTUSSIN DM) 1-5-10 mg/5 mL Syrp Take 5 mLs by mouth every 4 (four) hours as needed.  Qty: 120 mL, Refills: 0      fluticasone propionate (FLONASE) 50 mcg/actuation nasal spray 2 sprays (100 mcg total) by Each Nostril route once daily.  Qty: 15.8 mL, Refills: 0      HYDROcodone-acetaminophen (NORCO) 5-325 mg per tablet Take 1 tablet by mouth every 6 (six) hours as needed.  Qty: 8 tablet, Refills: 0    Comments: Quantity prescribed more than 7 day supply? No      HYDROcodone-acetaminophen (NORCO) 7.5-325 mg per tablet       hydrocortisone 2.5 % cream hydrocortisone 2.5 % topical cream      lactulose (CHRONULAC) 10 gram/15 mL solution lactulose 10 gram/15 mL oral solution      LIDOcaine HCl 2% (XYLOCAINE) 2 % Soln Lidocaine Viscous 2 % mucosal solution      MICROGESTIN 1/20, 21, 1-20 mg-mcg per tablet       ondansetron (ZOFRAN-ODT) 4 MG TbDL Take 2 tablets (8 mg total) by mouth every 6 (six) hours as needed (nausea).  Qty: 20 tablet, Refills: 0      polyethylene glycol (GLYCOLAX) 17 gram/dose powder polyethylene glycol 3350 17 gram/dose oral powder      PRENATAL VITAMIN PLUS LOW IRON 27 mg iron- 1 mg Tab       promethazine (PHENERGAN) 25 MG suppository Place 1 suppository (25 mg total) rectally every 6 (six) hours as needed for Nausea.  Qty: 12 suppository, Refills: 0      tiZANidine (ZANAFLEX) 4 MG tablet tizanidine 4 mg tablet      tretinoin (RETIN-A) 0.05 % cream tretinoin 0.05 % topical cream      triazolam (HALCION) 0.25 MG Tab              Ora Whipple MD  Obstetrics  [facility]

## 2022-08-14 NOTE — DISCHARGE INSTRUCTIONS

## 2022-08-14 NOTE — NURSING
VSS/AF. Fundus firm and lochia minimal. Discharge instructions given by RN to patient and significant other. Questions answered and pt verbalizes understanding. Follow up scheduled for 6 weeks. Pt ambulated down for discharge accomplanied by RN and FOC.  NAD noted.

## 2022-08-15 LAB
BLD PROD TYP BPU: NORMAL
BLD PROD TYP BPU: NORMAL
BLOOD UNIT EXPIRATION DATE: NORMAL
BLOOD UNIT EXPIRATION DATE: NORMAL
BLOOD UNIT TYPE CODE: 6200
BLOOD UNIT TYPE CODE: 6200
BLOOD UNIT TYPE: NORMAL
BLOOD UNIT TYPE: NORMAL
CODING SYSTEM: NORMAL
CODING SYSTEM: NORMAL
DISPENSE STATUS: NORMAL
DISPENSE STATUS: NORMAL
NUM UNITS TRANS PACKED RBC: NORMAL
NUM UNITS TRANS PACKED RBC: NORMAL

## 2022-09-02 ENCOUNTER — PATIENT MESSAGE (OUTPATIENT)
Dept: RADIOLOGY | Facility: HOSPITAL | Age: 37
End: 2022-09-02
Payer: COMMERCIAL

## 2022-09-27 ENCOUNTER — HOSPITAL ENCOUNTER (EMERGENCY)
Facility: HOSPITAL | Age: 37
Discharge: HOME | End: 2022-09-28
Attending: EMERGENCY MEDICINE
Payer: COMMERCIAL

## 2022-09-27 DIAGNOSIS — R51.9 ACUTE NONINTRACTABLE HEADACHE, UNSPECIFIED HEADACHE TYPE: ICD-10-CM

## 2022-09-27 DIAGNOSIS — I10 HYPERTENSION: ICD-10-CM

## 2022-09-27 DIAGNOSIS — R03.0 ELEVATED BLOOD PRESSURE READING: Primary | ICD-10-CM

## 2022-09-27 PROCEDURE — 99285 EMERGENCY DEPT VISIT HI MDM: CPT | Mod: 25

## 2022-09-27 PROCEDURE — 96374 THER/PROPH/DIAG INJ IV PUSH: CPT

## 2022-09-28 VITALS
WEIGHT: 152 LBS | RESPIRATION RATE: 14 BRPM | HEIGHT: 60 IN | DIASTOLIC BLOOD PRESSURE: 88 MMHG | HEART RATE: 72 BPM | TEMPERATURE: 99 F | OXYGEN SATURATION: 98 % | SYSTOLIC BLOOD PRESSURE: 151 MMHG | BODY MASS INDEX: 29.84 KG/M2

## 2022-09-28 LAB
ALBUMIN SERPL BCP-MCNC: 4.4 G/DL (ref 3.5–5.2)
ALP SERPL-CCNC: 83 U/L (ref 55–135)
ALT SERPL W/O P-5'-P-CCNC: 24 U/L (ref 10–44)
ANION GAP SERPL CALC-SCNC: 9 MMOL/L (ref 8–16)
AST SERPL-CCNC: 23 U/L (ref 10–40)
BASOPHILS # BLD AUTO: 0.03 K/UL (ref 0–0.2)
BASOPHILS NFR BLD: 0.4 % (ref 0–1.9)
BILIRUB SERPL-MCNC: 0.6 MG/DL (ref 0.1–1)
BILIRUB UR QL STRIP: NEGATIVE
BNP SERPL-MCNC: 22 PG/ML (ref 0–99)
BUN SERPL-MCNC: 16 MG/DL (ref 6–20)
CALCIUM SERPL-MCNC: 9.6 MG/DL (ref 8.7–10.5)
CHLORIDE SERPL-SCNC: 106 MMOL/L (ref 95–110)
CLARITY UR: CLEAR
CO2 SERPL-SCNC: 27 MMOL/L (ref 23–29)
COLOR UR: YELLOW
CREAT SERPL-MCNC: 0.7 MG/DL (ref 0.5–1.4)
CREAT UR-MCNC: 85 MG/DL (ref 15–325)
DIFFERENTIAL METHOD: ABNORMAL
EOSINOPHIL # BLD AUTO: 0.1 K/UL (ref 0–0.5)
EOSINOPHIL NFR BLD: 1.9 % (ref 0–8)
ERYTHROCYTE [DISTWIDTH] IN BLOOD BY AUTOMATED COUNT: 14.3 % (ref 11.5–14.5)
EST. GFR  (NO RACE VARIABLE): >60 ML/MIN/1.73 M^2
GLUCOSE SERPL-MCNC: 84 MG/DL (ref 70–110)
GLUCOSE UR QL STRIP: NEGATIVE
HCT VFR BLD AUTO: 39.9 % (ref 37–48.5)
HGB BLD-MCNC: 12.5 G/DL (ref 12–16)
HGB UR QL STRIP: NEGATIVE
IMM GRANULOCYTES # BLD AUTO: 0.01 K/UL (ref 0–0.04)
IMM GRANULOCYTES NFR BLD AUTO: 0.1 % (ref 0–0.5)
KETONES UR QL STRIP: NEGATIVE
LEUKOCYTE ESTERASE UR QL STRIP: NEGATIVE
LYMPHOCYTES # BLD AUTO: 2.1 K/UL (ref 1–4.8)
LYMPHOCYTES NFR BLD: 29.4 % (ref 18–48)
MAGNESIUM SERPL-MCNC: 2.4 MG/DL (ref 1.6–2.6)
MCH RBC QN AUTO: 26.3 PG (ref 27–31)
MCHC RBC AUTO-ENTMCNC: 31.3 G/DL (ref 32–36)
MCV RBC AUTO: 84 FL (ref 82–98)
MONOCYTES # BLD AUTO: 0.4 K/UL (ref 0.3–1)
MONOCYTES NFR BLD: 5.9 % (ref 4–15)
NEUTROPHILS # BLD AUTO: 4.3 K/UL (ref 1.8–7.7)
NEUTROPHILS NFR BLD: 62.3 % (ref 38–73)
NITRITE UR QL STRIP: NEGATIVE
NRBC BLD-RTO: 0 /100 WBC
PH UR STRIP: 7 [PH] (ref 5–8)
PLATELET # BLD AUTO: 268 K/UL (ref 150–450)
PMV BLD AUTO: 9.6 FL (ref 9.2–12.9)
POTASSIUM SERPL-SCNC: 3.7 MMOL/L (ref 3.5–5.1)
PROT SERPL-MCNC: 8.2 G/DL (ref 6–8.4)
PROT UR QL STRIP: NEGATIVE
PROT UR-MCNC: 9 MG/DL (ref 6–15)
PROT/CREAT UR: 0.11 MG/G{CREAT} (ref 0–0.2)
RBC # BLD AUTO: 4.76 M/UL (ref 4–5.4)
SODIUM SERPL-SCNC: 142 MMOL/L (ref 136–145)
SP GR UR STRIP: 1.02 (ref 1–1.03)
URN SPEC COLLECT METH UR: NORMAL
UROBILINOGEN UR STRIP-ACNC: NEGATIVE EU/DL
WBC # BLD AUTO: 6.97 K/UL (ref 3.9–12.7)

## 2022-09-28 PROCEDURE — 93010 ELECTROCARDIOGRAM REPORT: CPT | Mod: ,,, | Performed by: SPECIALIST

## 2022-09-28 PROCEDURE — 83735 ASSAY OF MAGNESIUM: CPT | Performed by: NURSE PRACTITIONER

## 2022-09-28 PROCEDURE — 84156 ASSAY OF PROTEIN URINE: CPT | Performed by: EMERGENCY MEDICINE

## 2022-09-28 PROCEDURE — 80053 COMPREHEN METABOLIC PANEL: CPT | Performed by: NURSE PRACTITIONER

## 2022-09-28 PROCEDURE — 93005 ELECTROCARDIOGRAM TRACING: CPT | Performed by: SPECIALIST

## 2022-09-28 PROCEDURE — 81003 URINALYSIS AUTO W/O SCOPE: CPT | Performed by: EMERGENCY MEDICINE

## 2022-09-28 PROCEDURE — 93010 EKG 12-LEAD: ICD-10-PCS | Mod: ,,, | Performed by: SPECIALIST

## 2022-09-28 PROCEDURE — 83880 ASSAY OF NATRIURETIC PEPTIDE: CPT | Performed by: NURSE PRACTITIONER

## 2022-09-28 PROCEDURE — 25000003 PHARM REV CODE 250: Performed by: EMERGENCY MEDICINE

## 2022-09-28 PROCEDURE — 85025 COMPLETE CBC W/AUTO DIFF WBC: CPT | Performed by: NURSE PRACTITIONER

## 2022-09-28 PROCEDURE — 36415 COLL VENOUS BLD VENIPUNCTURE: CPT | Performed by: NURSE PRACTITIONER

## 2022-09-28 RX ORDER — SODIUM CHLORIDE, SODIUM LACTATE, POTASSIUM CHLORIDE, CALCIUM CHLORIDE 600; 310; 30; 20 MG/100ML; MG/100ML; MG/100ML; MG/100ML
INJECTION, SOLUTION INTRAVENOUS CONTINUOUS
Status: DISCONTINUED | OUTPATIENT
Start: 2022-09-28 | End: 2022-09-28

## 2022-09-28 RX ORDER — MAGNESIUM SULFATE HEPTAHYDRATE 40 MG/ML
4 INJECTION, SOLUTION INTRAVENOUS ONCE
Status: DISCONTINUED | OUTPATIENT
Start: 2022-09-28 | End: 2022-09-28

## 2022-09-28 RX ORDER — CALCIUM GLUCONATE 98 MG/ML
1 INJECTION, SOLUTION INTRAVENOUS
Status: DISCONTINUED | OUTPATIENT
Start: 2022-09-28 | End: 2022-09-28

## 2022-09-28 RX ORDER — LABETALOL HYDROCHLORIDE 5 MG/ML
40 INJECTION, SOLUTION INTRAVENOUS ONCE AS NEEDED
Status: DISCONTINUED | OUTPATIENT
Start: 2022-09-28 | End: 2022-09-28

## 2022-09-28 RX ORDER — LABETALOL HYDROCHLORIDE 5 MG/ML
20 INJECTION, SOLUTION INTRAVENOUS ONCE AS NEEDED
Status: COMPLETED | OUTPATIENT
Start: 2022-09-28 | End: 2022-09-28

## 2022-09-28 RX ORDER — HYDRALAZINE HYDROCHLORIDE 20 MG/ML
10 INJECTION INTRAMUSCULAR; INTRAVENOUS ONCE AS NEEDED
Status: DISCONTINUED | OUTPATIENT
Start: 2022-09-28 | End: 2022-09-28

## 2022-09-28 RX ORDER — LABETALOL HYDROCHLORIDE 5 MG/ML
80 INJECTION, SOLUTION INTRAVENOUS ONCE AS NEEDED
Status: DISCONTINUED | OUTPATIENT
Start: 2022-09-28 | End: 2022-09-28

## 2022-09-28 RX ORDER — MAGNESIUM SULFATE HEPTAHYDRATE 40 MG/ML
2 INJECTION, SOLUTION INTRAVENOUS CONTINUOUS
Status: DISCONTINUED | OUTPATIENT
Start: 2022-09-28 | End: 2022-09-28

## 2022-09-28 RX ADMIN — LABETALOL HYDROCHLORIDE 20 MG: 5 INJECTION, SOLUTION INTRAVENOUS at 01:09

## 2022-09-28 NOTE — ED PROVIDER NOTES
Encounter Date: 2022       History     Chief Complaint   Patient presents with    Hypertension     Pt 6 weeks postpartum      Emergent evaluation of a 37-year-old female  who delivered a baby at  38.4 wga by  who presents to the ER now for evaluation of elevated blood pressure reading.  Patient is 6 weeks postpartum.  She reports she has never had issues with blood pressure in the past.  She reports that she has been having some recent elevated blood pressures but this far out from delivery she has been referred by her OBGYN DR Echevarria TO A pcp.  She reported elevated blood pressures night with a blood pressure 179/128 on arrival.  She stated she was having a headache earlier in the evening posterior to the eyes and that she was having blurry vision and spots within her visual field.  She also stated she was having chest heaviness.  She denied shortness of breath   When she was evaluated by nursing staff blood pressure had improved to 138 systolic.  She denies any symptoms.  On my arrival in the room she reported that her headache was returning that she did not feel well and she was confused.  And her blurry vision had returned.  Her mother who was at bedside reported that she was acting differently than before.        Review of patient's allergies indicates:   Allergen Reactions    Hydrocodone Hives    Flagyl [metronidazole hcl] Other (See Comments)     Oral form only--burns her   stomach. Take IV only.  Had to have surgery to fix this    Aspirin Nausea Only     Past Medical History:   Diagnosis Date    Anemia     Clostridium difficile colitis     hx--reoccurs with antibiotic use    Cystitis 2014    Endometriosis     Glucose intolerance     Ovarian cyst     Ovarian cyst 2014    Pelvic pain in female     Sleep apnea, unspecified     Thyroid disease     Thyroid nodule    Urinary tract infection, site not specified      Past Surgical History:   Procedure Laterality Date    EXCISION OF LESION  OF NOSE Bilateral 2/18/2019    Procedure: EXCISION, LESION, NOSE;  Surgeon: Chadd Ward MD;  Location: [facility] OR;  Service: ENT;  Laterality: Bilateral;    LASER LAPAROSCOPY  07/01/2013    pt has had 3 surgies     MYOMECTOMY      MYRINGOTOMY WITH INSERTION OF VENTILATION TUBE Right 2/18/2019    Procedure: MYRINGOTOMY, WITH TYMPANOSTOMY TUBE INSERTION;  Surgeon: Chadd Ward MD;  Location: [facility] OR;  Service: ENT;  Laterality: Right;    DE EXPLORATORY OF ABDOMEN  2012    DE EXPLORATORY OF ABDOMEN  04/15/2014    VAGINAL DELIVERY      x2 normal     Family History   Problem Relation Age of Onset    Hypertension Mother     Hypertension Father      Social History     Tobacco Use    Smoking status: Never    Smokeless tobacco: Never   Substance Use Topics    Alcohol use: No     Comment: rarely    Drug use: No     Review of Systems   Constitutional:  Negative for activity change, appetite change, chills, diaphoresis, fatigue and fever.   HENT:  Negative for congestion, postnasal drip, rhinorrhea and sore throat.    Eyes:  Positive for visual disturbance.   Respiratory:  Negative for cough, chest tightness, shortness of breath, wheezing and stridor.    Cardiovascular:  Positive for chest pain. Negative for palpitations.   Gastrointestinal:  Negative for abdominal distention, abdominal pain, blood in stool, constipation, diarrhea, nausea and vomiting.   Genitourinary:  Negative for dysuria, flank pain, frequency, hematuria and urgency.   Musculoskeletal:  Negative for arthralgias, back pain, myalgias, neck pain and neck stiffness.   Skin:  Negative for rash.   Neurological:  Positive for light-headedness and headaches. Negative for dizziness, seizures, syncope, speech difficulty, weakness and numbness.   Hematological:  Does not bruise/bleed easily.   Psychiatric/Behavioral:  Positive for confusion. Negative for agitation and behavioral problems. The patient is not nervous/anxious.    All other systems reviewed and are  negative.    Physical Exam     Initial Vitals [09/27/22 2309]   BP Pulse Resp Temp SpO2   (!) 179/128 77 18 99.1 °F (37.3 °C) 100 %      MAP       --         Physical Exam    Nursing note and vitals reviewed.  Constitutional: She appears well-developed and well-nourished. She is not diaphoretic. No distress.   HENT:   Head: Normocephalic and atraumatic.   Right Ear: External ear normal.   Left Ear: External ear normal.   Nose: Nose normal.   Mouth/Throat: Oropharynx is clear and moist.   Eyes: Conjunctivae and EOM are normal. Pupils are equal, round, and reactive to light.   Neck: Neck supple. No tracheal deviation present.   Normal range of motion.  Cardiovascular:  Normal rate, regular rhythm, normal heart sounds and intact distal pulses.     Exam reveals no gallop and no friction rub.       No murmur heard.  168/100 pulse of 72   Pulmonary/Chest: Breath sounds normal. No stridor. No respiratory distress. She has no wheezes. She has no rhonchi. She has no rales. She exhibits no tenderness.   Abdominal: Abdomen is soft. Bowel sounds are normal. She exhibits no distension and no mass. There is no abdominal tenderness. There is no rebound and no guarding.   Musculoskeletal:         General: No tenderness or edema. Normal range of motion.      Cervical back: Normal range of motion and neck supple.     Neurological: She is alert and oriented to person, place, and time. She has normal strength. No cranial nerve deficit or sensory deficit.   Patient reports she feels lesion lightheaded wave back in bed and eyes roll back in her head.  Patient does not lose consciousness.   Skin: Skin is warm and dry. No rash noted. No erythema. No pallor.   Psychiatric: Her behavior is normal. Judgment and thought content normal.   Patient is tearful anxious       ED Course   Procedures  Labs Reviewed   CBC W/ AUTO DIFFERENTIAL - Abnormal; Notable for the following components:       Result Value    MCH 26.3 (*)     MCHC 31.3 (*)     All  other components within normal limits   COMPREHENSIVE METABOLIC PANEL   PROTEIN / CREATININE RATIO, URINE    Narrative:     Specimen Source->Urine   MAGNESIUM   B-TYPE NATRIURETIC PEPTIDE   URINALYSIS, REFLEX TO URINE CULTURE    Narrative:     Specimen Source->Urine   B-TYPE NATRIURETIC PEPTIDE   MAGNESIUM     EKG Readings: (Independently Interpreted)   Initial Reading: No STEMI. Rhythm: Normal Sinus Rhythm. Heart Rate: 75. Ectopy: No Ectopy. Conduction: Normal. Other Findings: Prolonged QT Interval.   Inverted T-wave in lead 3, week 3 and the 4   ECG Results              EKG 12-lead (In process)  Result time 09/28/22 05:14:34      In process by Interface, Lab In [facility] (09/28/22 05:14:34)                   Narrative:    Test Reason : I10,    Vent. Rate : 075 BPM     Atrial Rate : 075 BPM     P-R Int : 140 ms          QRS Dur : 088 ms      QT Int : 420 ms       P-R-T Axes : 022 -08 007 degrees     QTc Int : 469 ms    Normal sinus rhythm  Nonspecific T wave abnormality  Prolonged QT  Abnormal ECG  When compared with ECG of 09-SEP-2021 10:17,  T wave inversion now evident in Anterior leads    Referred By: AAAREFERR   SELF           Confirmed By:                                   Imaging Results              CT Head Without Contrast (Final result)  Result time 09/28/22 02:45:23      Final result by Gayle Adhikari DO (09/28/22 02:45:23)                   Narrative:    EXAM:  CT Head Without Intravenous Contrast    FACILITY:  [facility]    REFERRING:  AAAREFLAYAAL SELF    CLINICAL HISTORY:  37 years, Female; Mental status change, unknown cause     TECHNIQUE:  Axial computed tomography images of the head/brain without intravenous contrast.  Sagittal and coronal reformatted images were created and reviewed.  This CT exam was performed using one or more of the following dose reduction techniques:  automated exposure control, adjustment of the mA and/or kV according to patient size, and/or use of  iterative reconstruction technique.    COMPARISON:  No relevant prior studies available.    FINDINGS:  Brain:  No acute pathology.  No hemorrhage.  No significant white matter disease.  No edema.  Ventricles:  No acute pathology.  No ventriculomegaly.  Bones/joints:  No acute pathology.  No acute fracture.  Soft tissues:  No acute pathology.  Sinuses:  Unremarkable as visualized.  No acute sinusitis.  Mastoid air cells:  Unremarkable as visualized.  No mastoid effusion.  Other findings:    IMPRESSION:  No acute pathology. Symptoms persist consider MRI for further evaluation.    Electronically signed by:  Gayle Adhikari DO  2022 2:45 AM CDT Workstation: [id number]                                     X-Ray Chest AP Portable (Final result)  Result time 22 06:37:32      Final result by Marco Vincent MD (22 06:37:32)                   Narrative:    Reason: Hypertension.    FINDINGS:    Portable chest with comparison chest x-ray 2021 show normal cardiomediastinal silhouette.  Lungs are clear. Pulmonary vasculature is normal. No acute osseous abnormality.    IMPRESSION:    No acute cardiopulmonary abnormality.    Electronically signed by:  Marco Vincent DO  2022 6:37 AM CDT Workstation: [id number]                                  X-Rays:   Independently Interpreted Readings:   Chest X-Ray: Normal heart size.  No infiltrates.  No acute abnormalities.   Medications   labetaloL injection 20 mg (20 mg Intravenous Given 22)     Medical Decision Making:   Independently Interpreted Test(s):   I have ordered and independently interpreted X-rays - see prior notes.  I have ordered and independently interpreted EKG Reading(s) - see prior notes  Clinical Tests:   Lab Tests: Ordered and Reviewed  Radiological Study: Ordered and Reviewed  Medical Tests: Ordered and Reviewed  ED Management:  Emergent evaluation of a 37-year-old female  who delivered a baby at  38.4 wga by   who presents to the ER now for evaluation of elevated blood pressure reading.  Patient is 6 weeks postpartum.  She reports she has never had issues with blood pressure in the past.  She reports that she has been having some recent elevated blood pressures but this far out from delivery she has been referred by her OBGYN DR Echevarria TO A pcp.  She reported elevated blood pressures night with a blood pressure 179/128 on arrival.  She stated she was having a headache earlier in the evening posterior to the eyes and that she was having blurry vision and spots within her visual field.  She also stated she was having chest heaviness.  She denied shortness of breath   When she was evaluated by nursing staff blood pressure had improved to 138 systolic.  She denies any symptoms.  On my arrival in the room she reported that her headache was returning that she did not feel well and she was confused.  And her blurry vision had returned.  Her mother who was at bedside reported that she was acting differently than before.  On physical exam patient's blood pressure was 168/100.  Normal heart rate sats of 90% on room air respirations of 14.  When I was asking the patient questions she seemed confused and had difficulty answering simple yes no questions and could not even tell me why she was in the ER.  Her mother reported this was acutely different than she was several minutes prior when she was speaking to nursing staff.  And her blood pressure was normal.  Patient had normal strength in the upper lower extremities and felt near syncopal laying back in bed with her eyes rolling back in her head and became tearful.  Due to concern for preeclampsia possible intracranial bleeding she went immediately to head CT.  Upon returning patient was feeling improved and was sitting using her cellphone blood pressure improved to 151/88 after she had gotten 1 dose of 10 mg of IV labetalol.  On my re-evaluation patient reports all of her symptoms  have now improved she no longer has a headache vision changes or chest pain.  EKG showed no ischemic changes chest x-ray was clear and her workup that included a CBC revealed normal white count no anemia and normal platelet level, CMP with normal kidney and liver function urinalysis was normal with no protein in the urine and she had a urine protein creatinine ratio of 0.11  I have explained to the patient that she does not have symptoms of postpartum preeclampsia at this time with this normal lab work and she has a negative head CT.  She also had a normal BNP.  Blood pressure is now improved.  Patient now has a completely normal neurologic exam. Though she did receive labetalol.  I will not discharge her home with blood pressure medication she needs to call Dr. Whipple this morning to discuss with her if they would like her to be on blood pressure medicine until she sees the new PCP in 1 month.  I believe at this time that her symptoms may been worsened by anxiety earlier in her stay.  Sil Watson M.D.  7:36 AM 9/28/2022                          Clinical Impression:   Final diagnoses:  [I10] Hypertension  [R03.0] Elevated blood pressure reading (Primary)  [R51.9] Acute nonintractable headache, unspecified headache type        ED Disposition Condition    Discharge           ED Prescriptions    None       Follow-up Information       Follow up With Specialties Details Why Contact Info Additional Information    Ora Whipple MD Obstetrics, Obstetrics and Gynecology, Maternal and Fetal Medicine Call today To discuss antihypertensive medication [address]  [address]  [facility]  [facility] [address]  [phone]       [facility] - Emergency Dept Emergency Medicine Go to  If symptoms worsen- worsening headache, any numbness tingling or weakness in the extremities, confusion, speech changes vision changes or dizziness, chest pain or shortness of breath, [address]  [facility] [address]  [phone] 1st floor             Sil Watson MD  09/28/22 0738

## 2022-11-14 PROBLEM — O36.5930 POOR FETAL GROWTH AFFECTING MANAGEMENT OF MOTHER IN THIRD TRIMESTER: Status: RESOLVED | Noted: 2022-08-12 | Resolved: 2022-11-14

## 2023-01-28 ENCOUNTER — HOSPITAL ENCOUNTER (OUTPATIENT)
Dept: RADIOLOGY | Facility: HOSPITAL | Age: 38
Discharge: HOME | End: 2023-01-28
Attending: NURSE PRACTITIONER
Payer: COMMERCIAL

## 2023-01-28 DIAGNOSIS — G43.009 MIGRAINE WITHOUT AURA AND WITHOUT STATUS MIGRAINOSUS, NOT INTRACTABLE: ICD-10-CM

## 2023-01-28 PROCEDURE — 70544 MRV BRAIN WITHOUT CONTRAST: ICD-10-PCS | Mod: 26,,, | Performed by: RADIOLOGY

## 2023-01-28 PROCEDURE — 70544 MR ANGIOGRAPHY HEAD W/O DYE: CPT | Mod: TC

## 2023-01-28 PROCEDURE — 70544 MR ANGIOGRAPHY HEAD W/O DYE: CPT | Mod: 26,,, | Performed by: RADIOLOGY

## 2023-01-28 PROCEDURE — 70551 MRI BRAIN STEM W/O DYE: CPT | Mod: TC

## 2023-01-28 PROCEDURE — 70551 MRI BRAIN STEM W/O DYE: CPT | Mod: 26,,, | Performed by: RADIOLOGY

## 2023-01-28 PROCEDURE — 70551 MRI BRAIN WITHOUT CONTRAST: ICD-10-PCS | Mod: 26,,, | Performed by: RADIOLOGY

## 2023-01-30 DIAGNOSIS — G43.009 MIGRAINE WITHOUT AURA AND WITHOUT STATUS MIGRAINOSUS, NOT INTRACTABLE: Primary | ICD-10-CM

## 2023-01-31 ENCOUNTER — HOSPITAL ENCOUNTER (OUTPATIENT)
Dept: RADIOLOGY | Facility: HOSPITAL | Age: 38
Discharge: HOME | End: 2023-01-31
Attending: NURSE PRACTITIONER
Payer: COMMERCIAL

## 2023-01-31 DIAGNOSIS — G43.009 MIGRAINE WITHOUT AURA AND WITHOUT STATUS MIGRAINOSUS, NOT INTRACTABLE: ICD-10-CM

## 2023-01-31 PROCEDURE — 70544 MR ANGIOGRAPHY HEAD W/O DYE: CPT | Mod: 26,,, | Performed by: RADIOLOGY

## 2023-01-31 PROCEDURE — 70544 MR ANGIOGRAPHY HEAD W/O DYE: CPT | Mod: TC

## 2023-01-31 PROCEDURE — 70544 MRA BRAIN WITHOUT CONTRAST: ICD-10-PCS | Mod: 26,,, | Performed by: RADIOLOGY

## 2023-06-06 ENCOUNTER — LAB VISIT (OUTPATIENT)
Dept: LAB | Facility: HOSPITAL | Age: 38
End: 2023-06-06
Attending: STUDENT IN AN ORGANIZED HEALTH CARE EDUCATION/TRAINING PROGRAM
Payer: COMMERCIAL

## 2023-06-06 ENCOUNTER — OFFICE VISIT (OUTPATIENT)
Dept: PRIMARY CARE CLINIC | Facility: CLINIC | Age: 38
End: 2023-06-06
Payer: COMMERCIAL

## 2023-06-06 VITALS
WEIGHT: 157.88 LBS | HEART RATE: 83 BPM | BODY MASS INDEX: 30.99 KG/M2 | HEIGHT: 60 IN | DIASTOLIC BLOOD PRESSURE: 86 MMHG | SYSTOLIC BLOOD PRESSURE: 134 MMHG | OXYGEN SATURATION: 100 %

## 2023-06-06 DIAGNOSIS — E55.9 VITAMIN D DEFICIENCY: ICD-10-CM

## 2023-06-06 DIAGNOSIS — Z00.00 ANNUAL PHYSICAL EXAM: ICD-10-CM

## 2023-06-06 DIAGNOSIS — Z00.00 ANNUAL PHYSICAL EXAM: Primary | ICD-10-CM

## 2023-06-06 LAB
25(OH)D3+25(OH)D2 SERPL-MCNC: 26 NG/ML (ref 30–96)
ALBUMIN SERPL BCP-MCNC: 4.3 G/DL (ref 3.5–5.2)
ALP SERPL-CCNC: 67 U/L (ref 55–135)
ALT SERPL W/O P-5'-P-CCNC: 30 U/L (ref 10–44)
ANION GAP SERPL CALC-SCNC: 9 MMOL/L (ref 8–16)
AST SERPL-CCNC: 20 U/L (ref 10–40)
BASOPHILS # BLD AUTO: 0.03 K/UL (ref 0–0.2)
BASOPHILS NFR BLD: 0.3 % (ref 0–1.9)
BILIRUB SERPL-MCNC: 0.6 MG/DL (ref 0.1–1)
BUN SERPL-MCNC: 7 MG/DL (ref 6–20)
CALCIUM SERPL-MCNC: 9.8 MG/DL (ref 8.7–10.5)
CHLORIDE SERPL-SCNC: 102 MMOL/L (ref 95–110)
CHOLEST SERPL-MCNC: 239 MG/DL (ref 120–199)
CHOLEST/HDLC SERPL: 4.9 {RATIO} (ref 2–5)
CO2 SERPL-SCNC: 27 MMOL/L (ref 23–29)
CREAT SERPL-MCNC: 0.7 MG/DL (ref 0.5–1.4)
DIFFERENTIAL METHOD: ABNORMAL
EOSINOPHIL # BLD AUTO: 0.1 K/UL (ref 0–0.5)
EOSINOPHIL NFR BLD: 0.5 % (ref 0–8)
ERYTHROCYTE [DISTWIDTH] IN BLOOD BY AUTOMATED COUNT: 12.3 % (ref 11.5–14.5)
EST. GFR  (NO RACE VARIABLE): >60 ML/MIN/1.73 M^2
ESTIMATED AVG GLUCOSE: 88 MG/DL (ref 68–131)
GLUCOSE SERPL-MCNC: 81 MG/DL (ref 70–110)
HBA1C MFR BLD: 4.7 % (ref 4–5.6)
HCT VFR BLD AUTO: 38.7 % (ref 37–48.5)
HCV AB SERPL QL IA: NORMAL
HDLC SERPL-MCNC: 49 MG/DL (ref 40–75)
HDLC SERPL: 20.5 % (ref 20–50)
HGB BLD-MCNC: 12.2 G/DL (ref 12–16)
IMM GRANULOCYTES # BLD AUTO: 0.02 K/UL (ref 0–0.04)
IMM GRANULOCYTES NFR BLD AUTO: 0.2 % (ref 0–0.5)
LDLC SERPL CALC-MCNC: 161.6 MG/DL (ref 63–159)
LYMPHOCYTES # BLD AUTO: 2.2 K/UL (ref 1–4.8)
LYMPHOCYTES NFR BLD: 23.8 % (ref 18–48)
MCH RBC QN AUTO: 28 PG (ref 27–31)
MCHC RBC AUTO-ENTMCNC: 31.5 G/DL (ref 32–36)
MCV RBC AUTO: 89 FL (ref 82–98)
MONOCYTES # BLD AUTO: 0.5 K/UL (ref 0.3–1)
MONOCYTES NFR BLD: 5.7 % (ref 4–15)
NEUTROPHILS # BLD AUTO: 6.3 K/UL (ref 1.8–7.7)
NEUTROPHILS NFR BLD: 69.5 % (ref 38–73)
NONHDLC SERPL-MCNC: 190 MG/DL
NRBC BLD-RTO: 0 /100 WBC
PLATELET # BLD AUTO: 275 K/UL (ref 150–450)
PMV BLD AUTO: 10.1 FL (ref 9.2–12.9)
POTASSIUM SERPL-SCNC: 4.1 MMOL/L (ref 3.5–5.1)
PROT SERPL-MCNC: 7.8 G/DL (ref 6–8.4)
RBC # BLD AUTO: 4.36 M/UL (ref 4–5.4)
SODIUM SERPL-SCNC: 138 MMOL/L (ref 136–145)
TRIGL SERPL-MCNC: 142 MG/DL (ref 30–150)
TSH SERPL DL<=0.005 MIU/L-ACNC: 0.8 UIU/ML (ref 0.4–4)
WBC # BLD AUTO: 9.11 K/UL (ref 3.9–12.7)

## 2023-06-06 PROCEDURE — 3044F HG A1C LEVEL LT 7.0%: CPT | Mod: CPTII,S$GLB,, | Performed by: STUDENT IN AN ORGANIZED HEALTH CARE EDUCATION/TRAINING PROGRAM

## 2023-06-06 PROCEDURE — 80053 COMPREHEN METABOLIC PANEL: CPT | Performed by: STUDENT IN AN ORGANIZED HEALTH CARE EDUCATION/TRAINING PROGRAM

## 2023-06-06 PROCEDURE — 85025 COMPLETE CBC W/AUTO DIFF WBC: CPT | Performed by: STUDENT IN AN ORGANIZED HEALTH CARE EDUCATION/TRAINING PROGRAM

## 2023-06-06 PROCEDURE — 99395 PR PREVENTIVE VISIT,EST,18-39: ICD-10-PCS | Mod: S$GLB,,, | Performed by: STUDENT IN AN ORGANIZED HEALTH CARE EDUCATION/TRAINING PROGRAM

## 2023-06-06 PROCEDURE — 1159F PR MEDICATION LIST DOCUMENTED IN MEDICAL RECORD: ICD-10-PCS | Mod: CPTII,S$GLB,, | Performed by: STUDENT IN AN ORGANIZED HEALTH CARE EDUCATION/TRAINING PROGRAM

## 2023-06-06 PROCEDURE — 3044F PR MOST RECENT HEMOGLOBIN A1C LEVEL <7.0%: ICD-10-PCS | Mod: CPTII,S$GLB,, | Performed by: STUDENT IN AN ORGANIZED HEALTH CARE EDUCATION/TRAINING PROGRAM

## 2023-06-06 PROCEDURE — 86803 HEPATITIS C AB TEST: CPT | Performed by: STUDENT IN AN ORGANIZED HEALTH CARE EDUCATION/TRAINING PROGRAM

## 2023-06-06 PROCEDURE — 83036 HEMOGLOBIN GLYCOSYLATED A1C: CPT | Performed by: STUDENT IN AN ORGANIZED HEALTH CARE EDUCATION/TRAINING PROGRAM

## 2023-06-06 PROCEDURE — 3075F SYST BP GE 130 - 139MM HG: CPT | Mod: CPTII,S$GLB,, | Performed by: STUDENT IN AN ORGANIZED HEALTH CARE EDUCATION/TRAINING PROGRAM

## 2023-06-06 PROCEDURE — 84443 ASSAY THYROID STIM HORMONE: CPT | Performed by: STUDENT IN AN ORGANIZED HEALTH CARE EDUCATION/TRAINING PROGRAM

## 2023-06-06 PROCEDURE — 1160F RVW MEDS BY RX/DR IN RCRD: CPT | Mod: CPTII,S$GLB,, | Performed by: STUDENT IN AN ORGANIZED HEALTH CARE EDUCATION/TRAINING PROGRAM

## 2023-06-06 PROCEDURE — 1159F MED LIST DOCD IN RCRD: CPT | Mod: CPTII,S$GLB,, | Performed by: STUDENT IN AN ORGANIZED HEALTH CARE EDUCATION/TRAINING PROGRAM

## 2023-06-06 PROCEDURE — 82306 VITAMIN D 25 HYDROXY: CPT | Performed by: STUDENT IN AN ORGANIZED HEALTH CARE EDUCATION/TRAINING PROGRAM

## 2023-06-06 PROCEDURE — 3008F PR BODY MASS INDEX (BMI) DOCUMENTED: ICD-10-PCS | Mod: CPTII,S$GLB,, | Performed by: STUDENT IN AN ORGANIZED HEALTH CARE EDUCATION/TRAINING PROGRAM

## 2023-06-06 PROCEDURE — 3079F DIAST BP 80-89 MM HG: CPT | Mod: CPTII,S$GLB,, | Performed by: STUDENT IN AN ORGANIZED HEALTH CARE EDUCATION/TRAINING PROGRAM

## 2023-06-06 PROCEDURE — 3079F PR MOST RECENT DIASTOLIC BLOOD PRESSURE 80-89 MM HG: ICD-10-PCS | Mod: CPTII,S$GLB,, | Performed by: STUDENT IN AN ORGANIZED HEALTH CARE EDUCATION/TRAINING PROGRAM

## 2023-06-06 PROCEDURE — 1160F PR REVIEW ALL MEDS BY PRESCRIBER/CLIN PHARMACIST DOCUMENTED: ICD-10-PCS | Mod: CPTII,S$GLB,, | Performed by: STUDENT IN AN ORGANIZED HEALTH CARE EDUCATION/TRAINING PROGRAM

## 2023-06-06 PROCEDURE — 99395 PREV VISIT EST AGE 18-39: CPT | Mod: S$GLB,,, | Performed by: STUDENT IN AN ORGANIZED HEALTH CARE EDUCATION/TRAINING PROGRAM

## 2023-06-06 PROCEDURE — 3075F PR MOST RECENT SYSTOLIC BLOOD PRESS GE 130-139MM HG: ICD-10-PCS | Mod: CPTII,S$GLB,, | Performed by: STUDENT IN AN ORGANIZED HEALTH CARE EDUCATION/TRAINING PROGRAM

## 2023-06-06 PROCEDURE — 99999 PR PBB SHADOW E&M-EST. PATIENT-LVL IV: ICD-10-PCS | Mod: PBBFAC,,, | Performed by: STUDENT IN AN ORGANIZED HEALTH CARE EDUCATION/TRAINING PROGRAM

## 2023-06-06 PROCEDURE — 99999 PR PBB SHADOW E&M-EST. PATIENT-LVL IV: CPT | Mod: PBBFAC,,, | Performed by: STUDENT IN AN ORGANIZED HEALTH CARE EDUCATION/TRAINING PROGRAM

## 2023-06-06 PROCEDURE — 36415 COLL VENOUS BLD VENIPUNCTURE: CPT | Performed by: STUDENT IN AN ORGANIZED HEALTH CARE EDUCATION/TRAINING PROGRAM

## 2023-06-06 PROCEDURE — 3008F BODY MASS INDEX DOCD: CPT | Mod: CPTII,S$GLB,, | Performed by: STUDENT IN AN ORGANIZED HEALTH CARE EDUCATION/TRAINING PROGRAM

## 2023-06-06 PROCEDURE — 80061 LIPID PANEL: CPT | Performed by: STUDENT IN AN ORGANIZED HEALTH CARE EDUCATION/TRAINING PROGRAM

## 2023-06-06 RX ORDER — AMLODIPINE BESYLATE 5 MG/1
5 TABLET ORAL EVERY MORNING
COMMUNITY
Start: 2023-04-14 | End: 2023-08-02 | Stop reason: SDUPTHER

## 2023-06-06 RX ORDER — .PYRIDOXINE HYDROCHLORIDE, CYANOCOBALAMIN, CALCIUM CARBONATE AND FOLIC ACID 75; 12; 200; 1 MG/1; UG/1; MG/1; MG/1
TABLET, COATED ORAL
COMMUNITY
Start: 2021-11-08 | End: 2024-01-08 | Stop reason: SDUPTHER

## 2023-06-06 RX ORDER — PHENTERMINE HYDROCHLORIDE 37.5 MG/1
37.5 TABLET ORAL
COMMUNITY
Start: 2023-03-16 | End: 2023-08-03

## 2023-06-06 RX ORDER — ASCORBIC ACID, CHOLECALCIFEROL, .ALPHA.-TOCOPHEROL ACETATE, DL-, PYRIDOXINE HYDROCHLORIDE, FOLIC ACID, CYANOCOBALAMIN, BIOTIN, CALCIUM CARBONATE, FERROUS ASPARTO GLYCINATE, IRON, POTASSIUM IODIDE, MAGNESIUM OXIDE, DOCONEXENT AND LOWBUSH BLUEBERRY 60; 1000; 10; 26; 400; 13; 280; 80; 9; 9; 150; 25; 350; 25; 600 MG/1; [IU]/1; [IU]/1; MG/1; UG/1; UG/1; UG/1; MG/1; MG/1; MG/1; UG/1; MG/1; MG/1; MG/1; UG/1
CAPSULE, GELATIN COATED ORAL
COMMUNITY
Start: 2023-06-04 | End: 2023-08-02 | Stop reason: ALTCHOICE

## 2023-06-06 RX ORDER — MONTELUKAST SODIUM 10 MG/1
10 TABLET ORAL NIGHTLY
COMMUNITY
Start: 2023-04-14 | End: 2023-08-02 | Stop reason: SDUPTHER

## 2023-06-06 RX ORDER — MULTIVIT 47/IRON/FOLATE 1/DHA 27-1-300MG
1 CAPSULE ORAL DAILY
COMMUNITY
Start: 2023-01-20

## 2023-06-06 RX ORDER — AZELASTINE 1 MG/ML
SPRAY, METERED NASAL
COMMUNITY
Start: 2023-03-02 | End: 2023-06-22 | Stop reason: SDUPTHER

## 2023-06-06 RX ORDER — TRANEXAMIC ACID 650 MG/1
TABLET ORAL
COMMUNITY
Start: 2023-04-14

## 2023-06-06 RX ORDER — LABETALOL 200 MG/1
TABLET, FILM COATED ORAL
COMMUNITY
Start: 2023-01-17 | End: 2023-08-02 | Stop reason: ALTCHOICE

## 2023-06-06 NOTE — PROGRESS NOTES
SUBJECTIVE   Annual exam      HPI  Amara Reis is a 37 y.o. female with medical diagnoses as listed in the medical history and problem list that presents for annual exam. Pt is establishing care with me today.    Pt is UTD on age appropriate CA screening.  Pap smear performed at [facility] with Dr. Whipple 08/2022.    Family, social, surgical Hx reviewed     Health Maintenance         Date Due Completion Date    Hepatitis C Screening Never done ---    Lipid Panel Never done ---    Cervical Cancer Screening 12/01/2019 12/1/2016    Hemoglobin A1c (Diabetic Prevention Screening) Never done ---    COVID-19 Vaccine (2 - Booster for Bean series) 08/21/2021 6/26/2021    Influenza Vaccine (Season Ended) 09/01/2023 9/9/2020    TETANUS VACCINE 08/14/2032 8/14/2022            PAST MEDICAL HISTORY:  Past Medical History:   Diagnosis Date    Anemia     Clostridium difficile colitis     hx--reoccurs with antibiotic use    Cystitis 05/20/2014    Endometriosis     Glucose intolerance     Ovarian cyst     Ovarian cyst 05/20/2014    Pelvic pain in female     Sleep apnea, unspecified     Thyroid disease     Thyroid nodule    Urinary tract infection, site not specified        PAST SURGICAL HISTORY:  Past Surgical History:   Procedure Laterality Date    EXCISION OF LESION OF NOSE Bilateral 2/18/2019    Procedure: EXCISION, LESION, NOSE;  Surgeon: Chadd Ward MD;  Location: [facility] OR;  Service: ENT;  Laterality: Bilateral;    LASER LAPAROSCOPY  07/01/2013    pt has had 3 surgies     MYOMECTOMY      MYRINGOTOMY WITH INSERTION OF VENTILATION TUBE Right 2/18/2019    Procedure: MYRINGOTOMY, WITH TYMPANOSTOMY TUBE INSERTION;  Surgeon: Chadd Ward MD;  Location: [facility] OR;  Service: ENT;  Laterality: Right;    [place] EXPLORATORY OF ABDOMEN  2012    [place] EXPLORATORY OF ABDOMEN  04/15/2014    VAGINAL DELIVERY      x2 normal       SOCIAL HISTORY:  Social History     Socioeconomic History    Marital status:    Tobacco  Use    Smoking status: Never    Smokeless tobacco: Never   Substance and Sexual Activity    Alcohol use: No     Comment: rarely    Drug use: Never    Sexual activity: Yes     Partners: Male       FAMILY HISTORY:  Family History   Problem Relation Age of Onset    Hypertension Mother     Miscarriages / Stillbirths Mother     Fibromyalgia Mother     Hypertension Father     Thyroid disease Brother     Breast cancer Neg Hx     Colon cancer Neg Hx        ALLERGIES AND MEDICATIONS: updated and reviewed.  Review of patient's allergies indicates:   Allergen Reactions    Hydrocodone Hives    Morphine Hives, Itching and Nausea And Vomiting    Flagyl [metronidazole hcl] Other (See Comments)     Oral form only--burns her   stomach. Take IV only.  Had to have surgery to fix this    Aspirin Nausea Only     Current Outpatient Medications   Medication Sig Dispense Refill    azelastine (ASTELIN) 137 mcg (0.1 %) nasal spray USE 1 SPRAY IN EACH NOSTRIL EVERY MORNING AND 1 SPRAY IN EACH NOSTRIL BEFORE BEDTIME AS DIRECTED      multivit 47-iron-folate 1-dha (WESCAP-PN DHA) 27 mg iron-1 mg -300 mg Cap Take 1 tablet by mouth once daily.      phentermine (ADIPEX-P) 37.5 mg tablet Take 37.5 mg by mouth.      PRENATE MINI, FERR ASP GLYCIN, 18-1-350 mg Cap       tranexamic acid (LYSTEDA) 650 mg tablet TAKE TWO TABLETS BY MOUTH THREE TIMES DAILY FOR THE 1ST 5 DAYS OF CYCLE      amLODIPine (NORVASC) 5 MG tablet Take 5 mg by mouth every morning.      cetirizine (ZYRTEC) 10 MG tablet Take 1 tablet (10 mg total) by mouth once daily. 30 tablet 0    labetaloL (NORMODYNE) 200 MG tablet       montelukast (SINGULAIR) 10 mg tablet Take 10 mg by mouth every evening.      multivit no.38-folate 6-gus (PRENATE AM) 1-500 mg Tab        Current Facility-Administered Medications   Medication Dose Route Frequency Provider Last Rate Last Admin    acetaminophen tablet 650 mg  650 mg Oral Once PRN Ирина Sanchez NP        albuterol inhaler 2 puff  2 puff Inhalation  Q20 Min PRN Ирина Sanchez NP        EPINEPHrine (EPIPEN) 0.3 mg/0.3 mL pen injection 0.3 mg  0.3 mg Intramuscular PRN Ирина Sanchez NP        methylPREDNISolone sodium succinate injection 40 mg  40 mg Intravenous Once PRN Ирина Sanchez NP        ondansetron disintegrating tablet 4 mg  4 mg Oral Once PRN Ирина Sanchez NP        sodium chloride 0.9% 500 mL flush bag   Intravenous PRN Ирина Sanchez NP        sodium chloride 0.9% flush 10 mL  10 mL Intravenous PRN Ирина Sanchez NP         Facility-Administered Medications Ordered in Other Visits   Medication Dose Route Frequency Provider Last Rate Last Admin    lactated ringers infusion   Intravenous Continuous Chadd Ward MD   Stopped at 02/18/19 0746    sodium chloride 0.9% flush 3 mL  3 mL Intravenous PRN Chadd Ward MD           ROS  Review of Systems   Constitutional:  Negative for fever and weight loss.   Respiratory:  Negative for cough and shortness of breath.    Cardiovascular:  Negative for chest pain and palpitations.   Gastrointestinal:  Negative for abdominal pain, constipation, diarrhea, nausea and vomiting.   Genitourinary:  Negative for dysuria.   Musculoskeletal:  Negative for back pain and joint pain.   Skin:  Negative for rash.   Neurological:  Negative for dizziness, weakness and headaches.   Psychiatric/Behavioral:  Negative for depression. The patient is not nervous/anxious.        OBJECTIVE     Physical Exam  Vitals:    06/06/23 1414   BP: 134/86   Pulse: 83    Body mass index is 30.83 kg/m².  Weight: 71.6 kg (157 lb 13.6 oz)   Height: 5' (152.4 cm)     Physical Exam  HENT:      Head: Normocephalic and atraumatic.      Nose: Nose normal.      Mouth/Throat:      Mouth: Mucous membranes are moist.      Pharynx: Oropharynx is clear.   Eyes:      Extraocular Movements: Extraocular movements intact.      Conjunctiva/sclera: Conjunctivae normal.      Pupils: Pupils are equal, round, and reactive to light.   Cardiovascular:      Rate and Rhythm:  Normal rate and regular rhythm.   Pulmonary:      Effort: Pulmonary effort is normal.      Breath sounds: Normal breath sounds.   Musculoskeletal:         General: No swelling. Normal range of motion.      Cervical back: Normal range of motion.      Right lower leg: No edema.      Left lower leg: No edema.   Skin:     General: Skin is warm.      Findings: No lesion or rash.   Neurological:      General: No focal deficit present.      Mental Status: She is alert and oriented to person, place, and time.      Motor: No weakness.   Psychiatric:         Mood and Affect: Mood normal.         Thought Content: Thought content normal.             ASSESSMENT     37 y.o. female with     1. Annual physical exam    2. Vitamin D deficiency    3. BMI 30.0-30.9,adult        PLAN:     1. Annual physical exam  -     Hemoglobin A1C; Future; Expected date: 06/06/2023  -     Hepatitis C Antibody; Future; Expected date: 06/06/2023  -     TSH; Future; Expected date: 06/06/2023  -     Lipid Panel; Future; Expected date: 06/06/2023  -     Comprehensive Metabolic Panel; Future; Expected date: 06/06/2023  -     CBC Auto Differential; Future; Expected date: 06/06/2023    2. Vitamin D deficiency  -     Vitamin D; Future; Expected date: 06/06/2023    3. BMI 30.0-30.9,adult  -     Hemoglobin A1C; Future; Expected date: 06/06/2023  -     Lipid Panel; Future; Expected date: 06/06/2023        Discussed age and gender appropriate screenings at this visit and encouraged a healthy diet low in simple carbohydrates, and increased physical activity.  Counseled on medically appropriate vaccines based on age and current health condition.  Screening test reviewed and discussed with patient.      RTC in 1 year     Danya Guevara MD

## 2023-06-08 ENCOUNTER — PATIENT OUTREACH (OUTPATIENT)
Dept: ADMINISTRATIVE | Facility: HOSPITAL | Age: 38
End: 2023-06-08
Payer: COMMERCIAL

## 2023-06-08 NOTE — PROGRESS NOTES
Health Maintenance Due   Topic Date Due    COVID-19 Vaccine (2 - Booster for Bean series) 08/21/2021     Triggered LINKS and reconciled immunizations. Updated Care Everywhere. Imported outside pap smear results received from [facility] into . Chart review completed.

## 2023-06-10 ENCOUNTER — OFFICE VISIT (OUTPATIENT)
Dept: URGENT CARE | Facility: CLINIC | Age: 38
End: 2023-06-10
Payer: COMMERCIAL

## 2023-06-10 VITALS
HEART RATE: 86 BPM | WEIGHT: 160.63 LBS | BODY MASS INDEX: 31.54 KG/M2 | TEMPERATURE: 99 F | SYSTOLIC BLOOD PRESSURE: 125 MMHG | RESPIRATION RATE: 20 BRPM | DIASTOLIC BLOOD PRESSURE: 83 MMHG | HEIGHT: 60 IN | OXYGEN SATURATION: 99 %

## 2023-06-10 DIAGNOSIS — M79.644 FINGER PAIN, RIGHT: Primary | ICD-10-CM

## 2023-06-10 DIAGNOSIS — Z71.1 FEARED CONDITION NOT DEMONSTRATED: ICD-10-CM

## 2023-06-10 PROCEDURE — 99213 PR OFFICE/OUTPT VISIT, EST, LEVL III, 20-29 MIN: ICD-10-PCS | Mod: S$GLB,,,

## 2023-06-10 PROCEDURE — 99213 OFFICE O/P EST LOW 20 MIN: CPT | Mod: S$GLB,,,

## 2023-06-10 RX ORDER — HYDROCORTISONE 25 MG/G
CREAM TOPICAL
COMMUNITY
Start: 2022-12-13 | End: 2023-12-04 | Stop reason: ALTCHOICE

## 2023-06-10 NOTE — PATIENT INSTRUCTIONS
As discussed, if he starts to develop any kind of swelling, streaking, or signs of infection then do not hesitate seeking further care.  Make sure you follow-up with your primary care doctor in the next week.

## 2023-06-10 NOTE — PROGRESS NOTES
Subjective:      Patient ID: Amara Reis is a 37 y.o. female.    Vitals:  height is 5' (1.524 m) and weight is 72.8 kg (160 lb 9.6 oz). Her oral temperature is 98.9 °F (37.2 °C). Her blood pressure is 125/83 and her pulse is 86. Her respiration is 20 and oxygen saturation is 99%.     Chief Complaint: Finger Injury    Ms. Maloney reports that 2 days ago she was cleaning fish and checking her oysters.  She reports part of the bone of 1 of the fish became embedded underneath her right index finger nail.  She removed the bone, and has continued to have pain to the nailbed.  There is no indications or signs and symptoms of infection.  I have educated the patient that she can use a Lidoderm patch strips wrap around the finger for pain management.  I have given her instructions for return precautions and explained to her signs and symptoms of infection.  She reports she was concerned about a possible vibrio infection.  With her presentation have a low suspicion.  Instructed to follow-up with primary care in 1 week for symptoms not improving.    Constitution: Negative for chills, fatigue and fever.   HENT:  Negative for congestion, nosebleeds, postnasal drip and sinus pain.    Neck: Negative for neck pain and painful lymph nodes.   Eyes:  Negative for eye pain and eye redness.   Respiratory:  Negative for cough.    Gastrointestinal:  Negative for abdominal pain, nausea, vomiting, constipation and diarrhea.   Genitourinary:  Negative for dysuria, frequency and urgency.   Musculoskeletal:  Positive for pain (Right index finger).   Skin: Negative.         No indication of puncture wound   Allergic/Immunologic: Negative for environmental allergies and seasonal allergies.   Neurological:  Negative for dizziness, disorientation and altered mental status.   Hematologic/Lymphatic: Negative for swollen lymph nodes and easy bruising/bleeding. Does not bruise/bleed easily.   Psychiatric/Behavioral:  Negative for altered mental  status and disorientation.     Objective:     Physical Exam   Constitutional: She is oriented to person, place, and time. She appears well-developed. She is cooperative.  Non-toxic appearance. No distress. normal  HENT:   Head: Normocephalic and atraumatic.   Ears:   Right Ear: Hearing, tympanic membrane, external ear and ear canal normal.   Left Ear: Hearing, tympanic membrane, external ear and ear canal normal.   Nose: Nose normal. No mucosal edema or nasal deformity. No epistaxis. Right sinus exhibits no maxillary sinus tenderness and no frontal sinus tenderness. Left sinus exhibits no maxillary sinus tenderness and no frontal sinus tenderness.   Mouth/Throat: Uvula is midline, oropharynx is clear and moist and mucous membranes are normal. Mucous membranes are moist. No trismus in the jaw. Normal dentition. No uvula swelling.   Eyes: Conjunctivae and lids are normal. Pupils are equal, round, and reactive to light. Extraocular movement intact   Neck: Trachea normal and phonation normal. Neck supple.   Cardiovascular: Normal rate, regular rhythm, normal heart sounds and normal pulses.   Pulmonary/Chest: Effort normal and breath sounds normal.   Abdominal: Normal appearance. Soft. flat abdomen   Musculoskeletal: Normal range of motion.         General: Tenderness present. Normal range of motion.      Right hand: Right index finger: Exhibits tenderness (Fingertip).   Neurological: no focal deficit. She is alert and oriented to person, place, and time. She exhibits normal muscle tone.   Skin: Skin is warm, dry and intact. Capillary refill takes 2 to 3 seconds.   Psychiatric: Her speech is normal and behavior is normal. Judgment and thought content normal.   Nursing note and vitals reviewed.    Assessment:     1. Finger pain, right    2. Feared condition not demonstrated        Plan:       Finger pain, right    Feared condition not demonstrated        Instructed patient on pain management, conservative at home.  Hand  washing.  Return precautions.  Follow up directions

## 2023-06-22 ENCOUNTER — PATIENT MESSAGE (OUTPATIENT)
Dept: PRIMARY CARE CLINIC | Facility: CLINIC | Age: 38
End: 2023-06-22
Payer: COMMERCIAL

## 2023-06-22 RX ORDER — CETIRIZINE HYDROCHLORIDE 10 MG/1
10 TABLET ORAL DAILY
Qty: 90 TABLET | Refills: 3 | Status: SHIPPED | OUTPATIENT
Start: 2023-06-22 | End: 2023-08-02 | Stop reason: SDUPTHER

## 2023-06-22 RX ORDER — AZELASTINE 1 MG/ML
SPRAY, METERED NASAL
Qty: 30 ML | Refills: 3 | Status: SHIPPED | OUTPATIENT
Start: 2023-06-22 | End: 2023-08-02 | Stop reason: SDUPTHER

## 2023-06-22 RX ORDER — PHENTERMINE HYDROCHLORIDE 37.5 MG/1
37.5 TABLET ORAL
OUTPATIENT
Start: 2023-06-22

## 2023-06-22 NOTE — TELEPHONE ENCOUNTER
----- Message from Demi Claudio sent at 6/22/2023  2:36 PM CDT -----  Contact: [phone] Patient  Requesting an RX refill or new RX.  Is this a refill or new RX: new  RX name and strength (copy/paste from chart):  cetirizine (ZYRTEC) 10 MG tablet  Is this a 30 day or 90 day RX:   Pharmacy name and phone # (copy/paste from chart):    [id number] - [place], [place]  [address]  AT [facility]  [address] [facility] [address]  Phone: [phone] Fax: [phone]    Requesting an RX refill or new RX.  Is this a refill or new RX: new  RX name and strength (copy/paste from chart):  azelastine (ASTELIN) 137 mcg (0.1 %) nasal spray  Is this a 30 day or 90 day RX:   Pharmacy name and phone # (copy/paste from chart):    [facility] #[id number] - [place], [place] - [address] AT [facility]  [address] [facility] [address]  Phone: [phone] Fax: [phone]    Requesting an RX refill or new RX.  Is this a refill or new RX: new  RX name and strength (copy/paste from chart):  phentermine (ADIPEX-P) 37.5 mg tablet  Is this a 30 day or 90 day RX:   Pharmacy name and phone # (copy/paste from chart):    [facility] #[id number]  [place], [place] - [address] AT [facility]  [address] [facility] [address]  Phone: [phone] Fax: [phone]    Requesting an RX refill or new RX.  Is this a refill or new RX: new  RX name and strength (copy/paste from chart):  albuterol inhaler   Is this a 30 day or 90 day RX:   Pharmacy name and phone # (copy/paste from chart):    [id number] - [place] - [address]  AT [facility] [address] [place] [address] [facility] [address]  Phone: [phone] Fax: [phone]

## 2023-06-23 ENCOUNTER — PATIENT MESSAGE (OUTPATIENT)
Dept: PRIMARY CARE CLINIC | Facility: CLINIC | Age: 38
End: 2023-06-23
Payer: COMMERCIAL

## 2023-06-23 NOTE — TELEPHONE ENCOUNTER
I do not typically fill this medication, if she is interested in seeing weight loss clinic to discuss, I can put in a referral.

## 2023-08-02 ENCOUNTER — OFFICE VISIT (OUTPATIENT)
Dept: FAMILY MEDICINE | Facility: CLINIC | Age: 38
End: 2023-08-02
Payer: COMMERCIAL

## 2023-08-02 VITALS
SYSTOLIC BLOOD PRESSURE: 119 MMHG | TEMPERATURE: 99 F | DIASTOLIC BLOOD PRESSURE: 78 MMHG | BODY MASS INDEX: 30.57 KG/M2 | HEIGHT: 60 IN | HEART RATE: 80 BPM | WEIGHT: 155.69 LBS | RESPIRATION RATE: 20 BRPM

## 2023-08-02 DIAGNOSIS — J45.20 MILD INTERMITTENT ASTHMA WITHOUT COMPLICATION: ICD-10-CM

## 2023-08-02 DIAGNOSIS — I10 PRIMARY HYPERTENSION: ICD-10-CM

## 2023-08-02 DIAGNOSIS — J30.2 SEASONAL ALLERGIES: ICD-10-CM

## 2023-08-02 DIAGNOSIS — E55.9 VITAMIN D DEFICIENCY: Primary | ICD-10-CM

## 2023-08-02 PROCEDURE — 3008F PR BODY MASS INDEX (BMI) DOCUMENTED: ICD-10-PCS | Mod: CPTII,S$GLB,, | Performed by: NURSE PRACTITIONER

## 2023-08-02 PROCEDURE — 3078F DIAST BP <80 MM HG: CPT | Mod: CPTII,S$GLB,, | Performed by: NURSE PRACTITIONER

## 2023-08-02 PROCEDURE — 1159F MED LIST DOCD IN RCRD: CPT | Mod: CPTII,S$GLB,, | Performed by: NURSE PRACTITIONER

## 2023-08-02 PROCEDURE — 3074F PR MOST RECENT SYSTOLIC BLOOD PRESSURE < 130 MM HG: ICD-10-PCS | Mod: CPTII,S$GLB,, | Performed by: NURSE PRACTITIONER

## 2023-08-02 PROCEDURE — 3044F HG A1C LEVEL LT 7.0%: CPT | Mod: CPTII,S$GLB,, | Performed by: NURSE PRACTITIONER

## 2023-08-02 PROCEDURE — 1159F PR MEDICATION LIST DOCUMENTED IN MEDICAL RECORD: ICD-10-PCS | Mod: CPTII,S$GLB,, | Performed by: NURSE PRACTITIONER

## 2023-08-02 PROCEDURE — 3078F PR MOST RECENT DIASTOLIC BLOOD PRESSURE < 80 MM HG: ICD-10-PCS | Mod: CPTII,S$GLB,, | Performed by: NURSE PRACTITIONER

## 2023-08-02 PROCEDURE — 99203 OFFICE O/P NEW LOW 30 MIN: CPT | Mod: S$GLB,,, | Performed by: NURSE PRACTITIONER

## 2023-08-02 PROCEDURE — 3044F PR MOST RECENT HEMOGLOBIN A1C LEVEL <7.0%: ICD-10-PCS | Mod: CPTII,S$GLB,, | Performed by: NURSE PRACTITIONER

## 2023-08-02 PROCEDURE — 3008F BODY MASS INDEX DOCD: CPT | Mod: CPTII,S$GLB,, | Performed by: NURSE PRACTITIONER

## 2023-08-02 PROCEDURE — 3074F SYST BP LT 130 MM HG: CPT | Mod: CPTII,S$GLB,, | Performed by: NURSE PRACTITIONER

## 2023-08-02 PROCEDURE — 99203 PR OFFICE/OUTPT VISIT, NEW, LEVL III, 30-44 MIN: ICD-10-PCS | Mod: S$GLB,,, | Performed by: NURSE PRACTITIONER

## 2023-08-02 RX ORDER — CLOMIPHENE CITRATE 50 MG/1
TABLET ORAL
COMMUNITY
Start: 2023-07-17 | End: 2023-08-03

## 2023-08-02 RX ORDER — CETIRIZINE HYDROCHLORIDE 10 MG/1
10 TABLET ORAL DAILY
Qty: 90 TABLET | Refills: 1 | Status: SHIPPED | OUTPATIENT
Start: 2023-08-02 | End: 2024-03-12

## 2023-08-02 RX ORDER — ALBUTEROL SULFATE 90 UG/1
2 AEROSOL, METERED RESPIRATORY (INHALATION) EVERY 6 HOURS PRN
Qty: 18 G | Refills: 1 | Status: SHIPPED | OUTPATIENT
Start: 2023-08-02 | End: 2024-03-12

## 2023-08-02 RX ORDER — ERGOCALCIFEROL 1.25 MG/1
50000 CAPSULE ORAL
Qty: 4 CAPSULE | Refills: 2 | Status: SHIPPED | OUTPATIENT
Start: 2023-08-02 | End: 2023-10-31

## 2023-08-02 RX ORDER — MONTELUKAST SODIUM 10 MG/1
10 TABLET ORAL NIGHTLY
Qty: 90 TABLET | Refills: 1 | Status: SHIPPED | OUTPATIENT
Start: 2023-08-02 | End: 2023-12-04 | Stop reason: ALTCHOICE

## 2023-08-02 RX ORDER — AZELASTINE 1 MG/ML
SPRAY, METERED NASAL
Qty: 30 ML | Refills: 3 | Status: SHIPPED | OUTPATIENT
Start: 2023-08-02 | End: 2024-03-12

## 2023-08-02 RX ORDER — AMLODIPINE BESYLATE 5 MG/1
5 TABLET ORAL EVERY MORNING
Qty: 90 TABLET | Refills: 1 | Status: SHIPPED | OUTPATIENT
Start: 2023-08-02 | End: 2023-12-04 | Stop reason: ALTCHOICE

## 2023-08-02 NOTE — PROGRESS NOTES
Patient ID: Amara Reis is a 38 y.o. female.    Chief Complaint: Establish Care (39 yo female here to establish care with PCP. Pt states no c/o. KM)    Mrs. Orta presents today to establish care in  our practice. She was seen in the recent months by Dr. Guevara in [place] who is an [facility] based provider.  She had blood work done at this visit that I was able to review with her as it was available to view. She wanted to establish care closer to home. PMH was reviewed in great detail. She discussed weight management and was on phentermine prescribed by previous provider. I will defer her to colleague for weight management.  She is also in need of medication refills.     She denies any other issue or complaints.     We reviewed overdue health maintenance.       Past Medical History:   Diagnosis Date    Anemia     Clostridium difficile colitis     hx--reoccurs with antibiotic use    Cystitis 05/20/2014    Endometriosis     Glucose intolerance     Ovarian cyst     Ovarian cyst 05/20/2014    Pelvic pain in female     Sleep apnea, unspecified     Thyroid disease     Thyroid nodule    Urinary tract infection, site not specified      Past Surgical History:   Procedure Laterality Date    EXCISION OF LESION OF NOSE Bilateral 2/18/2019    Procedure: EXCISION, LESION, NOSE;  Surgeon: Chadd Ward MD;  Location: [facility];  Service: ENT;  Laterality: Bilateral;    LASER LAPAROSCOPY  07/01/2013    pt has had 3 surgies     MYOMECTOMY      MYRINGOTOMY WITH INSERTION OF VENTILATION TUBE Right 2/18/2019    Procedure: MYRINGOTOMY, WITH TYMPANOSTOMY TUBE INSERTION;  Surgeon: Chadd Ward MD;  Location: [facility] OR;  Service: ENT;  Laterality: Right;    AL EXPLORATORY OF ABDOMEN  2012    AL EXPLORATORY OF ABDOMEN  04/15/2014    VAGINAL DELIVERY      x2 normal         Tobacco History:  reports that she has never smoked. She has never used smokeless tobacco.      Review of patient's allergies indicates:   Allergen Reactions     Morphine Hives, Itching and Nausea And Vomiting    Flagyl [metronidazole hcl] Other (See Comments)     Oral form only--burns her   stomach. Take IV only.  Had to have surgery to fix this       Current Outpatient Medications:     hydrocortisone (ANUSOL-HC) 2.5 % rectal cream, APPLY TOPICALLY TO HEMORRHOIDS 2 TO 4 TIMES DAILY, Disp: , Rfl:     multivit 47-iron-folate 1-dha (WESCAP-PN DHA) 27 mg iron-1 mg -300 mg Cap, Take 1 tablet by mouth once daily., Disp: , Rfl:     multivit no.38-folate 6-ginger (PRENATE AM) 1-500 mg Tab, , Disp: , Rfl:     phentermine (ADIPEX-P) 37.5 mg tablet, Take 37.5 mg by mouth., Disp: , Rfl:     tranexamic acid (LYSTEDA) 650 mg tablet, TAKE TWO TABLETS BY MOUTH THREE TIMES DAILY FOR THE 1ST 5 DAYS OF CYCLE, Disp: , Rfl:     albuterol (VENTOLIN HFA) 90 mcg/actuation inhaler, Inhale 2 puffs into the lungs every 6 (six) hours as needed for Wheezing. Rescue, Disp: 18 g, Rfl: 1    amLODIPine (NORVASC) 5 MG tablet, Take 1 tablet (5 mg total) by mouth every morning., Disp: 90 tablet, Rfl: 1    azelastine (ASTELIN) 137 mcg (0.1 %) nasal spray, USE 1 SPRAY IN EACH NOSTRIL EVERY MORNING AND 1 SPRAY IN EACH NOSTRIL BEFORE BEDTIME AS DIRECTED, Disp: 30 mL, Rfl: 3    cetirizine (ZYRTEC) 10 MG tablet, Take 1 tablet (10 mg total) by mouth once daily., Disp: 90 tablet, Rfl: 1    clomiPHENE (CLOMID) 50 mg tablet, Take by mouth., Disp: , Rfl:     ergocalciferol (ERGOCALCIFEROL) 50,000 unit Cap, Take 1 capsule (50,000 Units total) by mouth every 7 days., Disp: 4 capsule, Rfl: 2    montelukast (SINGULAIR) 10 mg tablet, Take 1 tablet (10 mg total) by mouth every evening., Disp: 90 tablet, Rfl: 1    Current Facility-Administered Medications:     acetaminophen tablet 650 mg, 650 mg, Oral, Once PRN, Ирина Sanchez, NP    albuterol inhaler 2 puff, 2 puff, Inhalation, Q20 Min PRN, Ирина Sanchez, NP    EPINEPHrine (EPIPEN) 0.3 mg/0.3 mL pen injection 0.3 mg, 0.3 mg, Intramuscular, PRN, Ирина Sanchez, NP    ondansetron  disintegrating tablet 4 mg, 4 mg, Oral, Once PRN, Ирина Sanchez, NP    sodium chloride 0.9% 500 mL flush bag, , Intravenous, PRN, Ирина Sanchez, NP    sodium chloride 0.9% flush 10 mL, 10 mL, Intravenous, PRN, Ирина Sanchez, NP    Facility-Administered Medications Ordered in Other Visits:     lactated ringers infusion, , Intravenous, Continuous, Chadd Ward MD, Stopped at 02/18/19 0746    sodium chloride 0.9% flush 3 mL, 3 mL, Intravenous, PRN, Chadd Ward MD    Review of Systems   Constitutional:  Negative for activity change, appetite change, fatigue, fever and unexpected weight change.   HENT:  Negative for congestion, mouth sores, nosebleeds, postnasal drip, rhinorrhea, sinus pressure, sinus pain, sneezing, sore throat and trouble swallowing.    Eyes:  Negative for pain, redness and itching.   Respiratory:  Negative for chest tightness and shortness of breath.    Cardiovascular:  Negative for chest pain and palpitations.   Gastrointestinal:  Negative for abdominal pain, blood in stool, constipation, diarrhea, nausea and vomiting.   Endocrine: Negative for cold intolerance, heat intolerance, polydipsia, polyphagia and polyuria.   Genitourinary:  Negative for difficulty urinating, dysuria, flank pain, frequency, genital sores, menstrual problem, urgency, vaginal bleeding and vaginal discharge.   Musculoskeletal:  Negative for arthralgias and myalgias.   Skin:  Negative for rash and wound.   Allergic/Immunologic: Negative for environmental allergies and food allergies.   Neurological:  Negative for dizziness, light-headedness and headaches.   Hematological:  Negative for adenopathy. Does not bruise/bleed easily.   Psychiatric/Behavioral:  Negative for agitation, confusion, hallucinations, self-injury and sleep disturbance. The patient is not nervous/anxious.           Objective:      Vitals:    08/02/23 1410   BP: 119/78   Pulse: 80   Resp: 20   Temp: 98.9 °F (37.2 °C)   TempSrc: Oral   Weight: 70.6 kg  (155 lb 11.2 oz)   Height: 5' (1.524 m)     Physical Exam  Vitals reviewed.   Constitutional:       General: She is not in acute distress.     Appearance: Normal appearance. She is obese. She is not ill-appearing, toxic-appearing or diaphoretic.   HENT:      Head: Normocephalic and atraumatic.      Right Ear: Tympanic membrane and external ear normal. There is no impacted cerumen.      Left Ear: Tympanic membrane and external ear normal. There is no impacted cerumen.      Nose: Nose normal. No congestion or rhinorrhea.      Mouth/Throat:      Mouth: Mucous membranes are moist.      Pharynx: Oropharynx is clear. No oropharyngeal exudate or posterior oropharyngeal erythema.   Eyes:      General: No scleral icterus.        Right eye: No discharge.         Left eye: No discharge.      Extraocular Movements: Extraocular movements intact.      Conjunctiva/sclera: Conjunctivae normal.      Pupils: Pupils are equal, round, and reactive to light.   Neck:      Vascular: No carotid bruit.   Cardiovascular:      Rate and Rhythm: Normal rate and regular rhythm.      Pulses: Normal pulses.      Heart sounds: Normal heart sounds. No murmur heard.     No friction rub. No gallop.   Pulmonary:      Effort: Pulmonary effort is normal. No respiratory distress.      Breath sounds: Normal breath sounds. No stridor. No rales.   Chest:      Chest wall: No tenderness.   Abdominal:      General: Abdomen is flat. Bowel sounds are normal.      Palpations: Abdomen is soft. There is no mass.      Tenderness: There is no abdominal tenderness. There is no guarding.   Musculoskeletal:         General: No swelling or signs of injury. Normal range of motion.      Cervical back: Normal range of motion and neck supple. No rigidity or tenderness.      Right lower leg: No edema.      Left lower leg: No edema.   Skin:     General: Skin is warm and dry.      Capillary Refill: Capillary refill takes less than 2 seconds.      Findings: No bruising, lesion or  rash.   Neurological:      General: No focal deficit present.      Mental Status: She is alert and oriented to person, place, and time. Mental status is at baseline.      Motor: No weakness.      Coordination: Coordination normal.   Psychiatric:         Mood and Affect: Mood normal.         Behavior: Behavior normal.         Thought Content: Thought content normal.         Judgment: Judgment normal.           Assessment:       1. Vitamin D deficiency    2. Seasonal allergies    3. Primary hypertension    4. Mild intermittent asthma without complication           Plan:       Vitamin D deficiency  -     ergocalciferol (ERGOCALCIFEROL) 50,000 unit Cap; Take 1 capsule (50,000 Units total) by mouth every 7 days.  Dispense: 4 capsule; Refill: 2    Seasonal allergies  -     cetirizine (ZYRTEC) 10 MG tablet; Take 1 tablet (10 mg total) by mouth once daily.  Dispense: 90 tablet; Refill: 1  -     azelastine (ASTELIN) 137 mcg (0.1 %) nasal spray; USE 1 SPRAY IN EACH NOSTRIL EVERY MORNING AND 1 SPRAY IN EACH NOSTRIL BEFORE BEDTIME AS DIRECTED  Dispense: 30 mL; Refill: 3  -     montelukast (SINGULAIR) 10 mg tablet; Take 1 tablet (10 mg total) by mouth every evening.  Dispense: 90 tablet; Refill: 1    Primary hypertension  -     amLODIPine (NORVASC) 5 MG tablet; Take 1 tablet (5 mg total) by mouth every morning.  Dispense: 90 tablet; Refill: 1    Mild intermittent asthma without complication  -     albuterol (VENTOLIN HFA) 90 mcg/actuation inhaler; Inhale 2 puffs into the lungs every 6 (six) hours as needed for Wheezing. Rescue  Dispense: 18 g; Refill: 1      Follow up in about 4 months (around 12/2/2023), or if symptoms worsen or fail to improve, for Med refills.        8/2/2023 Sejal Guerrero NP

## 2023-08-03 ENCOUNTER — TELEPHONE (OUTPATIENT)
Dept: FAMILY MEDICINE | Facility: CLINIC | Age: 38
End: 2023-08-03

## 2023-08-03 ENCOUNTER — OFFICE VISIT (OUTPATIENT)
Dept: FAMILY MEDICINE | Facility: CLINIC | Age: 38
End: 2023-08-03
Payer: COMMERCIAL

## 2023-08-03 VITALS
SYSTOLIC BLOOD PRESSURE: 130 MMHG | BODY MASS INDEX: 30.43 KG/M2 | OXYGEN SATURATION: 100 % | WEIGHT: 155 LBS | DIASTOLIC BLOOD PRESSURE: 86 MMHG | HEART RATE: 80 BPM | HEIGHT: 60 IN

## 2023-08-03 DIAGNOSIS — E28.2 PCOS (POLYCYSTIC OVARIAN SYNDROME): ICD-10-CM

## 2023-08-03 DIAGNOSIS — Z76.89 ENCOUNTER FOR WEIGHT MANAGEMENT: ICD-10-CM

## 2023-08-03 DIAGNOSIS — E66.09 CLASS 1 OBESITY DUE TO EXCESS CALORIES WITHOUT SERIOUS COMORBIDITY WITH BODY MASS INDEX (BMI) OF 30.0 TO 30.9 IN ADULT: Primary | ICD-10-CM

## 2023-08-03 DIAGNOSIS — Z71.3 DIETARY COUNSELING AND SURVEILLANCE: ICD-10-CM

## 2023-08-03 PROCEDURE — 3075F SYST BP GE 130 - 139MM HG: CPT | Mod: CPTII,S$GLB,, | Performed by: FAMILY MEDICINE

## 2023-08-03 PROCEDURE — 3008F PR BODY MASS INDEX (BMI) DOCUMENTED: ICD-10-PCS | Mod: CPTII,S$GLB,, | Performed by: FAMILY MEDICINE

## 2023-08-03 PROCEDURE — 99214 PR OFFICE/OUTPT VISIT, EST, LEVL IV, 30-39 MIN: ICD-10-PCS | Mod: S$GLB,,, | Performed by: FAMILY MEDICINE

## 2023-08-03 PROCEDURE — 1159F MED LIST DOCD IN RCRD: CPT | Mod: CPTII,S$GLB,, | Performed by: FAMILY MEDICINE

## 2023-08-03 PROCEDURE — 3079F DIAST BP 80-89 MM HG: CPT | Mod: CPTII,S$GLB,, | Performed by: FAMILY MEDICINE

## 2023-08-03 PROCEDURE — 3075F PR MOST RECENT SYSTOLIC BLOOD PRESS GE 130-139MM HG: ICD-10-PCS | Mod: CPTII,S$GLB,, | Performed by: FAMILY MEDICINE

## 2023-08-03 PROCEDURE — 3008F BODY MASS INDEX DOCD: CPT | Mod: CPTII,S$GLB,, | Performed by: FAMILY MEDICINE

## 2023-08-03 PROCEDURE — 3044F HG A1C LEVEL LT 7.0%: CPT | Mod: CPTII,S$GLB,, | Performed by: FAMILY MEDICINE

## 2023-08-03 PROCEDURE — 3079F PR MOST RECENT DIASTOLIC BLOOD PRESSURE 80-89 MM HG: ICD-10-PCS | Mod: CPTII,S$GLB,, | Performed by: FAMILY MEDICINE

## 2023-08-03 PROCEDURE — 3044F PR MOST RECENT HEMOGLOBIN A1C LEVEL <7.0%: ICD-10-PCS | Mod: CPTII,S$GLB,, | Performed by: FAMILY MEDICINE

## 2023-08-03 PROCEDURE — 99214 OFFICE O/P EST MOD 30 MIN: CPT | Mod: S$GLB,,, | Performed by: FAMILY MEDICINE

## 2023-08-03 PROCEDURE — 1159F PR MEDICATION LIST DOCUMENTED IN MEDICAL RECORD: ICD-10-PCS | Mod: CPTII,S$GLB,, | Performed by: FAMILY MEDICINE

## 2023-08-03 RX ORDER — SEMAGLUTIDE 0.25 MG/.5ML
0.25 INJECTION, SOLUTION SUBCUTANEOUS
Qty: 4 EACH | Refills: 0 | Status: SHIPPED | OUTPATIENT
Start: 2023-08-03 | End: 2024-01-08 | Stop reason: RX

## 2023-08-03 NOTE — PATIENT INSTRUCTIONS
"Start keeping a written diary that tracks all meals, snacks, and beverages that you consume.  Begin slowly working toward a goal of exercising 150 minutes of moderate aerobic activity or 75 minutes of vigorous aerobic activity per week.   Use the handout "Diabetic Meal Planning" to help guide more healthful food choices.  Stay hydrated! Drink enough water per day so that your urine runs almost clear.   Take a multivitamin daily.  "

## 2023-08-03 NOTE — TELEPHONE ENCOUNTER
Called and spoke to patient regarding trying to conceive and patient stated that she is stopping the Clomid

## 2023-08-03 NOTE — TELEPHONE ENCOUNTER
The following medication needs a prior authorization:     Medication Name: Semaglutide (Wegovy)     Dosage: 0.25 mg/0.5 mL    Frequency: Every 7 days.    Directions for use: Inject 0.25 mg into the skin every 7 days    Diagnosis: Class 1 obesity due to excess calories without serious comorbidity with body mass index.   (BMI) of 30.0 to 30.9 in adult  Hyperlipidemia Total cholesterol of 239 with LDL of 161.6    Is the request for a reauthorization? No    Is the patient currently stable on therapy? Yes    Please list all therapeutic alternatives previously used with start/end dates and outcome:

## 2023-08-03 NOTE — PROGRESS NOTES
"  SUBJECTIVE:    Patient ID: Amara Reis is a 38 y.o. female.    Chief Complaint: Weight Check    HPI    Amara Reis is a 38 y.o. F patient of Jj Zavala. She has a history of Obesity and comes in to discuss medical weight management. Past medical history is notable for PCOS.     Initial: 155 lb, BMI 30.27 kg/m2     Labs: A1c 4.7%, TSH 0.801, , eGFR >60    The patient has struggled with weight issues for years since her first pregnancy. Max weight 160 lb. She has a strong family history of obesity and reports that her grandmother passed away in her 50s due to complications from severe obesity. Patient has tried various fad diets in the past with temporary success. Has also tried Phentermine at max dose, which was helpful.     Dietary habits include   B: usually no breakfast  S: candy, Coke  L: leftovers from night before such as soul food, steak, pasta, tortillas, rice, plantains, yuca  S: cakes, cookies, pies, candy, Coke  D: soul food, steak, pasta, tortillas, rice, plantains, yuca  S: cakes, cookies, pies, candy, Coke  O/n: sleeps intermittently due to having an infant; snacks when she is up during the night but does not specifically wake up to eat  SS[facility]: [facility]     Exercise habits include being an active mom to an infant, but no dedicated time.    Patient was previously on Clomid; however states she does not take it anymore and is not actively trying to conceive.     History of uncontrolled hypertension? no  History of cardiovascular disease? no  History of glaucoma? no  History of eating disorder? As a child, used to starve self "to stay skinny" under her mother's guidance. Was 85 lb at first pregnancy  History of substance use? none  History of cholelithiasis? no  History of pancreatitis? no  Personal or family history of Medullary Thyroid Cancer or MEN2? no      ROS:  Denies fevers, chills, or unintentional weight changes.  Denies polydipsia, polyuria, vision changes, " or paresthesias  Denies temperature intolerance, palpitations or skipped heartbeats, diarrhea/constipation, or mood changes.      Alcohol History:  reports no history of alcohol use.  Tobacco History:  reports that she has never smoked. She has never used smokeless tobacco.  Drug History:  reports no history of drug use.    Review of patient's allergies indicates:   Allergen Reactions    Morphine Hives, Itching and Nausea And Vomiting    Flagyl [metronidazole hcl] Other (See Comments)     Oral form only--burns her   stomach. Take IV only.  Had to have surgery to fix this       Current Outpatient Medications:     albuterol (VENTOLIN HFA) 90 mcg/actuation inhaler, Inhale 2 puffs into the lungs every 6 (six) hours as needed for Wheezing. Rescue, Disp: 18 g, Rfl: 1    amLODIPine (NORVASC) 5 MG tablet, Take 1 tablet (5 mg total) by mouth every morning., Disp: 90 tablet, Rfl: 1    azelastine (ASTELIN) 137 mcg (0.1 %) nasal spray, USE 1 SPRAY IN EACH NOSTRIL EVERY MORNING AND 1 SPRAY IN EACH NOSTRIL BEFORE BEDTIME AS DIRECTED, Disp: 30 mL, Rfl: 3    cetirizine (ZYRTEC) 10 MG tablet, Take 1 tablet (10 mg total) by mouth once daily., Disp: 90 tablet, Rfl: 1    ergocalciferol (ERGOCALCIFEROL) 50,000 unit Cap, Take 1 capsule (50,000 Units total) by mouth every 7 days., Disp: 4 capsule, Rfl: 2    hydrocortisone (ANUSOL-HC) 2.5 % rectal cream, APPLY TOPICALLY TO HEMORRHOIDS 2 TO 4 TIMES DAILY, Disp: , Rfl:     montelukast (SINGULAIR) 10 mg tablet, Take 1 tablet (10 mg total) by mouth every evening., Disp: 90 tablet, Rfl: 1    multivit 47-iron-folate 1-dha (WESCAP-PN DHA) 27 mg iron-1 mg -300 mg Cap, Take 1 tablet by mouth once daily., Disp: , Rfl:     multivit no.38-folate 6-ginger (PRENATE AM) 1-500 mg Tab, , Disp: , Rfl:     tranexamic acid (LYSTEDA) 650 mg tablet, TAKE TWO TABLETS BY MOUTH THREE TIMES DAILY FOR THE 1ST 5 DAYS OF CYCLE, Disp: , Rfl:     semaglutide, weight loss, (WEGOVY) 0.25 mg/0.5 mL PnIj, Inject 0.25 mg  into the skin every 7 days., Disp: 4 each, Rfl: 0    Current Facility-Administered Medications:     acetaminophen tablet 650 mg, 650 mg, Oral, Once PRN, Ирина Sanchez NP    albuterol inhaler 2 puff, 2 puff, Inhalation, Q20 Min PRN, Ирина Sanchez NP    EPINEPHrine (EPIPEN) 0.3 mg/0.3 mL pen injection 0.3 mg, 0.3 mg, Intramuscular, PRN, Ирина Sanchez NP    ondansetron disintegrating tablet 4 mg, 4 mg, Oral, Once PRN, Ирина Sanchez, NP    sodium chloride 0.9% 500 mL flush bag, , Intravenous, PRN, Ирина Sanchez NP    sodium chloride 0.9% flush 10 mL, 10 mL, Intravenous, PRNDaniel Jessica, NP    Facility-Administered Medications Ordered in Other Visits:     lactated ringers infusion, , Intravenous, Continuous, Chadd Ward MD, Stopped at 02/18/19 0746    sodium chloride 0.9% flush 3 mL, 3 mL, Intravenous, PRN, Chadd Ward MD           Objective:      Vitals:    08/03/23 0929 08/03/23 1044   BP: (!) 138/94 130/86   Pulse: 80    SpO2: 100%    Weight: 70.3 kg (155 lb)    Height: 5' (1.524 m)        Physical Exam  Vitals reviewed.   Constitutional:       General: She is not in acute distress.     Appearance: She is obese. She is not ill-appearing.   Cardiovascular:      Rate and Rhythm: Normal rate and regular rhythm.      Pulses: Normal pulses.      Heart sounds: Normal heart sounds.   Pulmonary:      Effort: Pulmonary effort is normal.      Breath sounds: Normal breath sounds.   Skin:     General: Skin is warm and dry.   Neurological:      Mental Status: She is alert and oriented to person, place, and time.   Psychiatric:         Mood and Affect: Mood normal.         Behavior: Behavior normal.              Assessment & Plan:       1. Class 1 obesity due to excess calories without serious comorbidity with body mass index (BMI) of 30.0 to 30.9 in adult    2. Encounter for weight management    3. Dietary counseling and surveillance    4. PCOS (polycystic ovarian syndrome)        Clinical picture consistent with  Obesity due to excessive caloric intake, Class 1 (low risk). Patient does not meet criteria for bariatric surgery (age 18-63yo and BMI greater than 40, or greater than 35 with an obesity related health condition). Medication options discussed include Wegovy, Phentermine, Qsymia, Contrave, Xenical, and Plenity. Decision to start Wegovy, with the understanding that it may not be covered by her insurance in which case will rx Phentermine. Could also consider Metformin given coexisting insulin resistant states in Obesity and PCOS. Would tread lightly with Contrave given possible hx eating disorder. Discussed that no AOM is approved in setting of pregnancy, and pt verbalized understanding.    Goal weight loss for next visit: 0.5-1 lb per week, with an overall short-term goal of 5-10% loss from initial weight.    Strategies/recommendations discussed with patient include keeping a written diary that tracks all meals, snacks, and beverages, as well as working toward a goal of exercising 150 minutes of moderate aerobic activity or 75 minutes of vigorous aerobic activity per week. Meal planning handout given to guide more healthful choices.      No follow-ups on file.        8/6/2023 Rosa M Mcdonald M.D.

## 2023-08-06 PROBLEM — E66.811 CLASS 1 OBESITY DUE TO EXCESS CALORIES WITHOUT SERIOUS COMORBIDITY WITH BODY MASS INDEX (BMI) OF 30.0 TO 30.9 IN ADULT: Status: ACTIVE | Noted: 2023-08-06

## 2023-08-06 PROBLEM — E66.09 CLASS 1 OBESITY DUE TO EXCESS CALORIES WITHOUT SERIOUS COMORBIDITY WITH BODY MASS INDEX (BMI) OF 30.0 TO 30.9 IN ADULT: Status: ACTIVE | Noted: 2023-08-06

## 2023-08-14 ENCOUNTER — PATIENT MESSAGE (OUTPATIENT)
Dept: FAMILY MEDICINE | Facility: CLINIC | Age: 38
End: 2023-08-14

## 2023-08-16 ENCOUNTER — TELEPHONE (OUTPATIENT)
Dept: FAMILY MEDICINE | Facility: CLINIC | Age: 38
End: 2023-08-16

## 2023-08-16 NOTE — TELEPHONE ENCOUNTER
Spoke with Rosangela at [facility] to verfiy vm left. Pts medication has already been covered by [employer].

## 2023-08-23 ENCOUNTER — TELEPHONE (OUTPATIENT)
Dept: FAMILY MEDICINE | Facility: CLINIC | Age: 38
End: 2023-08-23

## 2023-08-23 DIAGNOSIS — E66.09 CLASS 1 OBESITY DUE TO EXCESS CALORIES WITHOUT SERIOUS COMORBIDITY WITH BODY MASS INDEX (BMI) OF 30.0 TO 30.9 IN ADULT: Primary | ICD-10-CM

## 2023-08-23 RX ORDER — PHENTERMINE HYDROCHLORIDE 37.5 MG/1
37.5 TABLET ORAL
Qty: 30 TABLET | Refills: 0 | Status: SHIPPED | OUTPATIENT
Start: 2023-08-23 | End: 2023-09-22

## 2023-08-23 NOTE — TELEPHONE ENCOUNTER
The following medication needs a prior authorization:     Medication Name: Phentermine (Adipex-P)     Dosage: 37.5 mg tablet    Frequency:once daily    Directions for use: Take 1 tablet by mouth before breakfast    Diagnosis: Class 1 obesity due to excess calories  serious comorbidity with body mass index of 30.0 to 30.9 in adult.     Is the request for a reauthorization? No    Is the patient currently stable on therapy? Yes    Please list all therapeutic alternatives previously used with start/end dates and outcome: Wegovy

## 2023-12-04 ENCOUNTER — OFFICE VISIT (OUTPATIENT)
Dept: FAMILY MEDICINE | Facility: CLINIC | Age: 38
End: 2023-12-04
Payer: COMMERCIAL

## 2023-12-04 VITALS
RESPIRATION RATE: 20 BRPM | DIASTOLIC BLOOD PRESSURE: 80 MMHG | SYSTOLIC BLOOD PRESSURE: 118 MMHG | HEART RATE: 72 BPM | WEIGHT: 160.5 LBS | HEIGHT: 60 IN | TEMPERATURE: 98 F | BODY MASS INDEX: 31.51 KG/M2

## 2023-12-04 DIAGNOSIS — H69.93 DISORDER OF BOTH EUSTACHIAN TUBES: ICD-10-CM

## 2023-12-04 DIAGNOSIS — E66.09 CLASS 1 OBESITY DUE TO EXCESS CALORIES WITHOUT SERIOUS COMORBIDITY WITH BODY MASS INDEX (BMI) OF 30.0 TO 30.9 IN ADULT: Primary | ICD-10-CM

## 2023-12-04 DIAGNOSIS — M25.50 ARTHRALGIA, UNSPECIFIED JOINT: ICD-10-CM

## 2023-12-04 DIAGNOSIS — E55.9 VITAMIN D DEFICIENCY: ICD-10-CM

## 2023-12-04 DIAGNOSIS — E78.5 HYPERLIPIDEMIA, UNSPECIFIED HYPERLIPIDEMIA TYPE: ICD-10-CM

## 2023-12-04 PROCEDURE — 99213 PR OFFICE/OUTPT VISIT, EST, LEVL III, 20-29 MIN: ICD-10-PCS | Mod: S$GLB,,, | Performed by: NURSE PRACTITIONER

## 2023-12-04 PROCEDURE — 3044F PR MOST RECENT HEMOGLOBIN A1C LEVEL <7.0%: ICD-10-PCS | Mod: CPTII,S$GLB,, | Performed by: NURSE PRACTITIONER

## 2023-12-04 PROCEDURE — 1159F MED LIST DOCD IN RCRD: CPT | Mod: CPTII,S$GLB,, | Performed by: NURSE PRACTITIONER

## 2023-12-04 PROCEDURE — 3008F BODY MASS INDEX DOCD: CPT | Mod: CPTII,S$GLB,, | Performed by: NURSE PRACTITIONER

## 2023-12-04 PROCEDURE — 99213 OFFICE O/P EST LOW 20 MIN: CPT | Mod: S$GLB,,, | Performed by: NURSE PRACTITIONER

## 2023-12-04 PROCEDURE — 3044F HG A1C LEVEL LT 7.0%: CPT | Mod: CPTII,S$GLB,, | Performed by: NURSE PRACTITIONER

## 2023-12-04 PROCEDURE — 3008F PR BODY MASS INDEX (BMI) DOCUMENTED: ICD-10-PCS | Mod: CPTII,S$GLB,, | Performed by: NURSE PRACTITIONER

## 2023-12-04 PROCEDURE — 3079F PR MOST RECENT DIASTOLIC BLOOD PRESSURE 80-89 MM HG: ICD-10-PCS | Mod: CPTII,S$GLB,, | Performed by: NURSE PRACTITIONER

## 2023-12-04 PROCEDURE — 3074F PR MOST RECENT SYSTOLIC BLOOD PRESSURE < 130 MM HG: ICD-10-PCS | Mod: CPTII,S$GLB,, | Performed by: NURSE PRACTITIONER

## 2023-12-04 PROCEDURE — 1159F PR MEDICATION LIST DOCUMENTED IN MEDICAL RECORD: ICD-10-PCS | Mod: CPTII,S$GLB,, | Performed by: NURSE PRACTITIONER

## 2023-12-04 PROCEDURE — 3074F SYST BP LT 130 MM HG: CPT | Mod: CPTII,S$GLB,, | Performed by: NURSE PRACTITIONER

## 2023-12-04 PROCEDURE — 3079F DIAST BP 80-89 MM HG: CPT | Mod: CPTII,S$GLB,, | Performed by: NURSE PRACTITIONER

## 2023-12-04 RX ORDER — PHENTERMINE HYDROCHLORIDE 37.5 MG/1
37.5 TABLET ORAL
Qty: 30 TABLET | Refills: 0 | Status: SHIPPED | OUTPATIENT
Start: 2023-12-04 | End: 2024-01-03

## 2023-12-04 RX ORDER — PHENTERMINE HYDROCHLORIDE 37.5 MG/1
37.5 TABLET ORAL
Qty: 30 TABLET | Refills: 0 | Status: CANCELLED | OUTPATIENT
Start: 2023-12-04 | End: 2024-01-03

## 2023-12-04 NOTE — PROGRESS NOTES
Patient ID: Amara Reis is a 38 y.o. female.    Chief Complaint: Follow-up (39 yo female here for a 4 month refill. Pt states c/o lower back with  neck and spine pain. Pt states right ear tube came out and developed a leakage times 3 weeks ago. Pt also c/o ache over her whole body. KM)    Mrs. Maradiaga presents today for 4 month follow up and medication refill. She was started on phentermine prior to Dr. Birch going on [place] and she states she did well with it but did not return for follow up and is looking to resume treatment. Her current BMI is 31.35 patient has gained 5 lb since last being seen. She She is willing to diet and exercise. Weight loss goal is 130 lb. We discussed the importance of good follow up and habits to ensure efficacy and safety. Her blood pressure is well controlled at this time.    She also states that right Eustachian tube dislodged and she has since been having bloody drainage from right ear and needs referral to have this evaluated. She states Dr. Ward in [place] did the procedure but per his office they no longer do tubes and she would like a provider in this area.     Lastly, she has been having joint, neck and back pain intermittently for several months. She states her mom has a history of fibromyalgia and wonders if she has similar issue.     She denies any other issue or complaints at this  time.      Past Medical History:   Diagnosis Date    Anemia     Clostridium difficile colitis     hx--reoccurs with antibiotic use    Cystitis 05/20/2014    Endometriosis     Glucose intolerance     Ovarian cyst     Ovarian cyst 05/20/2014    Pelvic pain in female     Sleep apnea, unspecified     Thyroid disease     Thyroid nodule    Urinary tract infection, site not specified      Past Surgical History:   Procedure Laterality Date    EXCISION OF LESION OF NOSE Bilateral 2/18/2019    Procedure: EXCISION, LESION, NOSE;  Surgeon: Chadd Ward MD;  Location: [facility] OR;  Service: ENT;   Laterality: Bilateral;    LASER LAPAROSCOPY  07/01/2013    pt has had 3 surgies     MYOMECTOMY      MYRINGOTOMY WITH INSERTION OF VENTILATION TUBE Right 2/18/2019    Procedure: MYRINGOTOMY, WITH TYMPANOSTOMY TUBE INSERTION;  Surgeon: Chadd Ward MD;  Location: [facility];  Service: ENT;  Laterality: Right;    AZ EXPLORATORY OF ABDOMEN  2012    AZ EXPLORATORY OF ABDOMEN  04/15/2014    VAGINAL DELIVERY      x2 normal         Tobacco History:  reports that she has never smoked. She has never been exposed to tobacco smoke. She has never used smokeless tobacco.      Review of patient's allergies indicates:   Allergen Reactions    Morphine Hives, Itching and Nausea And Vomiting    Flagyl [metronidazole hcl] Other (See Comments)     Oral form only--burns her   stomach. Take IV only.  Had to have surgery to fix this       Current Outpatient Medications:     albuterol (VENTOLIN HFA) 90 mcg/actuation inhaler, Inhale 2 puffs into the lungs every 6 (six) hours as needed for Wheezing. Rescue, Disp: 18 g, Rfl: 1    azelastine (ASTELIN) 137 mcg (0.1 %) nasal spray, USE 1 SPRAY IN EACH NOSTRIL EVERY MORNING AND 1 SPRAY IN EACH NOSTRIL BEFORE BEDTIME AS DIRECTED, Disp: 30 mL, Rfl: 3    cetirizine (ZYRTEC) 10 MG tablet, Take 1 tablet (10 mg total) by mouth once daily., Disp: 90 tablet, Rfl: 1    multivit no.38-folate 6-ginger (PRENATE AM) 1-500 mg Tab, , Disp: , Rfl:     multivit 47-iron-folate 1-dha (WESCAP-PN DHA) 27 mg iron-1 mg -300 mg Cap, Take 1 tablet by mouth once daily., Disp: , Rfl:     semaglutide, weight loss, (WEGOVY) 0.25 mg/0.5 mL PnIj, Inject 0.25 mg into the skin every 7 days. (Patient not taking: Reported on 12/4/2023), Disp: 4 each, Rfl: 0    semaglutide, weight loss, (WEGOVY) 0.25 mg/0.5 mL PnIj, inject 0.25 mg into the skin every week (Patient not taking: Reported on 12/4/2023), Disp: 2 mL, Rfl: 0    tranexamic acid (LYSTEDA) 650 mg tablet, TAKE TWO TABLETS BY MOUTH THREE TIMES DAILY FOR THE 1ST 5 DAYS OF CYCLE,  Disp: , Rfl:     Current Facility-Administered Medications:     acetaminophen tablet 650 mg, 650 mg, Oral, Once PRN, Ирина Sanchez NP    albuterol inhaler 2 puff, 2 puff, Inhalation, Q20 Min PRN, Ирина Sanchez NP    EPINEPHrine (EPIPEN) 0.3 mg/0.3 mL pen injection 0.3 mg, 0.3 mg, Intramuscular, PRN, Ирина Sanchez NP    ondansetron disintegrating tablet 4 mg, 4 mg, Oral, Once PRN, Ирина Sanchez NP    sodium chloride 0.9% 500 mL flush bag, , Intravenous, PRN, Ирина Sanchez NP    sodium chloride 0.9% flush 10 mL, 10 mL, Intravenous, PRNDaniel Jessica, NP    Facility-Administered Medications Ordered in Other Visits:     lactated ringers infusion, , Intravenous, Continuous, Chadd Ward MD, Stopped at 02/18/19 0746    sodium chloride 0.9% flush 3 mL, 3 mL, Intravenous, PRN, Chadd Ward MD    Review of Systems   Constitutional:  Positive for activity change and unexpected weight change.   HENT:  Negative for hearing loss, rhinorrhea and trouble swallowing.    Eyes:  Negative for discharge and visual disturbance.   Respiratory:  Negative for chest tightness and wheezing.    Cardiovascular:  Negative for chest pain and palpitations.   Gastrointestinal:  Positive for constipation. Negative for blood in stool, diarrhea and vomiting.   Endocrine: Negative for polydipsia and polyuria.   Genitourinary:  Positive for menstrual problem. Negative for difficulty urinating, dysuria and hematuria.   Musculoskeletal:  Positive for neck pain. Negative for arthralgias and joint swelling.   Neurological:  Positive for headaches. Negative for weakness.   Psychiatric/Behavioral:  Negative for confusion and dysphoric mood.           Objective:      Vitals:    12/04/23 1325   BP: 118/80   Pulse: 72   Resp: 20   Temp: 98 °F (36.7 °C)   TempSrc: Oral   Weight: 72.8 kg (160 lb 8 oz)   Height: 5' (1.524 m)     Physical Exam  Constitutional:       Appearance: She is obese.   Cardiovascular:      Rate and Rhythm: Normal rate and  regular rhythm.      Pulses: Normal pulses.      Heart sounds: Normal heart sounds.   Pulmonary:      Breath sounds: Normal breath sounds.   Skin:     General: Skin is warm.   Neurological:      Mental Status: She is alert and oriented to person, place, and time.           Assessment:       1. Class 1 obesity due to excess calories without serious comorbidity with body mass index (BMI) of 30.0 to 30.9 in adult    2. Vitamin D deficiency    3. Hyperlipidemia, unspecified hyperlipidemia type    4. Arthralgia, unspecified joint    5. Disorder of both eustachian tubes           Plan:       Class 1 obesity due to excess calories without serious comorbidity with body mass index (BMI) of 30.0 to 30.9 in adult    Vitamin D deficiency  -     Vitamin D; Future; Expected date: 12/04/2023    Hyperlipidemia, unspecified hyperlipidemia type  -     Lipid Panel; Future; Expected date: 12/04/2023    Arthralgia, unspecified joint  -     RUT Screen w/Reflex; Future; Expected date: 12/04/2023    Disorder of both eustachian tubes  -     Cancel: Ambulatory referral/consult to ENT; Future; Expected date: 12/11/2023  -     Ambulatory referral/consult to ENT; Future; Expected date: 12/11/2023      Follow up in about 4 weeks (around 1/1/2024), or if symptoms worsen or fail to improve, for Weight recheck.      Counseled on heart healthy diet rich in fiber, fresh vegetables and fruit and low in saturated fats (fried foods, red meat, etc.).  I also recommend regular exercise including a minimum of 150 minutes of cardio exercise per week and 20-30 minute workouts of strength training like light weights, yoga, pilates, etc.      12/4/2023 Sejal Guerrero NP

## 2023-12-10 ENCOUNTER — OFFICE VISIT (OUTPATIENT)
Dept: URGENT CARE | Facility: CLINIC | Age: 38
End: 2023-12-10
Payer: COMMERCIAL

## 2023-12-10 VITALS
TEMPERATURE: 99 F | HEIGHT: 60 IN | SYSTOLIC BLOOD PRESSURE: 122 MMHG | OXYGEN SATURATION: 100 % | BODY MASS INDEX: 31.41 KG/M2 | WEIGHT: 160 LBS | DIASTOLIC BLOOD PRESSURE: 86 MMHG | HEART RATE: 74 BPM | RESPIRATION RATE: 18 BRPM

## 2023-12-10 DIAGNOSIS — Z20.828 EXPOSURE TO INFLUENZA: Primary | ICD-10-CM

## 2023-12-10 PROCEDURE — 99213 PR OFFICE/OUTPT VISIT, EST, LEVL III, 20-29 MIN: ICD-10-PCS | Mod: S$GLB,,,

## 2023-12-10 PROCEDURE — 99213 OFFICE O/P EST LOW 20 MIN: CPT | Mod: S$GLB,,,

## 2023-12-10 RX ORDER — OSELTAMIVIR PHOSPHATE 75 MG/1
75 CAPSULE ORAL 2 TIMES DAILY
Qty: 10 CAPSULE | Refills: 0 | Status: SHIPPED | OUTPATIENT
Start: 2023-12-10 | End: 2023-12-15

## 2023-12-10 NOTE — PROGRESS NOTES
Subjective:      Patient ID: Amara Reis is a 38 y.o. female.    Vitals:  height is 5' (1.524 m) and weight is 72.6 kg (160 lb). Her temperature is 98.8 °F (37.1 °C). Her blood pressure is 122/86 and her pulse is 74. Her respiration is 18 and oxygen saturation is 100%.     Chief Complaint: exposure to influenza (Daughter tested positive, wants tamiflu/)    Daughter just tested positive for the flu and patient would like prophylactic Tamiflu.  She isn't having any symptoms at this time.        Constitution: Negative. Negative for chills, fatigue and fever.   HENT: Negative.     Neck: neck negative.   Cardiovascular: Negative.    Eyes: Negative.    Respiratory: Negative.     Gastrointestinal: Negative.    Genitourinary: Negative.    Musculoskeletal: Negative.    Skin: Negative.    Neurological: Negative.    Psychiatric/Behavioral: Negative.        Objective:     Physical Exam   Constitutional: She is oriented to person, place, and time. normal  HENT:   Head: Normocephalic and atraumatic.   Ears:   Right Ear: External ear normal.   Left Ear: External ear normal.   Nose: Nose normal.   Mouth/Throat: Mucous membranes are moist.   Eyes: Conjunctivae are normal.   Cardiovascular: Normal rate.   Pulmonary/Chest: Effort normal. No respiratory distress.   Abdominal: Normal appearance.   Musculoskeletal: Normal range of motion.         General: Normal range of motion.   Neurological: She is alert and oriented to person, place, and time. She displays no weakness.   Skin: Skin is warm and dry.   Psychiatric: Her behavior is normal. Mood, judgment and thought content normal.   Nursing note and vitals reviewed.      Assessment:     1. Exposure to influenza        Plan:       Exposure to influenza  -     oseltamivir (TAMIFLU) 75 MG capsule; Take 1 capsule (75 mg total) by mouth 2 (two) times daily. for 5 days  Dispense: 10 capsule; Refill: 0      Discussed medication with patient who acknowledges understanding and is  agreeable to POC. Follow up with primary care. Increase fluid intake. Red flags for ER discussed.

## 2023-12-11 ENCOUNTER — TELEPHONE (OUTPATIENT)
Dept: OTOLARYNGOLOGY | Facility: CLINIC | Age: 38
End: 2023-12-11
Payer: COMMERCIAL

## 2023-12-11 NOTE — TELEPHONE ENCOUNTER
----- Message from Celena Buckley sent at 12/11/2023 10:11 AM CST -----  Contact: Self  Type:  Sooner Appointment Request    Caller is requesting a sooner appointment.  Caller declined first available appointment listed below.  Caller will not accept being placed on the waitlist and is requesting a message be sent to doctor.    Name of Caller:  Patient   When is the first available appointment?  01/25  Symptoms:  Pts R ear has been leaking pus and blood, stated she had tubes that came out  Would the patient rather a call back or a response via [facility]? Call back   Best Call Back Number:  [phone]  Additional Information:  Pt was seeing another ENT who was putting tubes in yearly and he no longer does that and now she is dealing with the leaking and is trying to be seen asap if possible, please call pt back to assist. Thank You.

## 2023-12-11 NOTE — TELEPHONE ENCOUNTER
Contacted and spoke w/pt per call back msg.  W/pt, I was able to schedule ENT appt w/Dr. Reeder for tomorrow, 12/12/23 @ 1145. Pt confirmed appt date/time.

## 2023-12-12 ENCOUNTER — OFFICE VISIT (OUTPATIENT)
Dept: OTOLARYNGOLOGY | Facility: CLINIC | Age: 38
End: 2023-12-12
Payer: COMMERCIAL

## 2023-12-12 VITALS — BODY MASS INDEX: 31.48 KG/M2 | WEIGHT: 160.31 LBS | HEIGHT: 60 IN

## 2023-12-12 DIAGNOSIS — H69.93 DISORDER OF BOTH EUSTACHIAN TUBES: ICD-10-CM

## 2023-12-12 DIAGNOSIS — H66.004 RECURRENT ACUTE SUPPURATIVE OTITIS MEDIA OF RIGHT EAR WITHOUT SPONTANEOUS RUPTURE OF TYMPANIC MEMBRANE: Primary | ICD-10-CM

## 2023-12-12 PROCEDURE — 99999 PR PBB SHADOW E&M-EST. PATIENT-LVL IV: CPT | Mod: PBBFAC,,, | Performed by: OTOLARYNGOLOGY

## 2023-12-12 PROCEDURE — 99999 PR PBB SHADOW E&M-EST. PATIENT-LVL IV: ICD-10-PCS | Mod: PBBFAC,,, | Performed by: OTOLARYNGOLOGY

## 2023-12-12 PROCEDURE — 1159F PR MEDICATION LIST DOCUMENTED IN MEDICAL RECORD: ICD-10-PCS | Mod: S$GLB,,, | Performed by: OTOLARYNGOLOGY

## 2023-12-12 PROCEDURE — 3044F PR MOST RECENT HEMOGLOBIN A1C LEVEL <7.0%: ICD-10-PCS | Mod: S$GLB,,, | Performed by: OTOLARYNGOLOGY

## 2023-12-12 PROCEDURE — 1159F MED LIST DOCD IN RCRD: CPT | Mod: S$GLB,,, | Performed by: OTOLARYNGOLOGY

## 2023-12-12 PROCEDURE — 3044F HG A1C LEVEL LT 7.0%: CPT | Mod: S$GLB,,, | Performed by: OTOLARYNGOLOGY

## 2023-12-12 PROCEDURE — 99213 OFFICE O/P EST LOW 20 MIN: CPT | Mod: S$GLB,,, | Performed by: OTOLARYNGOLOGY

## 2023-12-12 PROCEDURE — 1160F RVW MEDS BY RX/DR IN RCRD: CPT | Mod: S$GLB,,, | Performed by: OTOLARYNGOLOGY

## 2023-12-12 PROCEDURE — 99213 PR OFFICE/OUTPT VISIT, EST, LEVL III, 20-29 MIN: ICD-10-PCS | Mod: S$GLB,,, | Performed by: OTOLARYNGOLOGY

## 2023-12-12 PROCEDURE — 3008F BODY MASS INDEX DOCD: CPT | Mod: S$GLB,,, | Performed by: OTOLARYNGOLOGY

## 2023-12-12 PROCEDURE — 1160F PR REVIEW ALL MEDS BY PRESCRIBER/CLIN PHARMACIST DOCUMENTED: ICD-10-PCS | Mod: S$GLB,,, | Performed by: OTOLARYNGOLOGY

## 2023-12-12 PROCEDURE — 3008F PR BODY MASS INDEX (BMI) DOCUMENTED: ICD-10-PCS | Mod: S$GLB,,, | Performed by: OTOLARYNGOLOGY

## 2023-12-12 RX ORDER — CIPROFLOXACIN HYDROCHLORIDE 3 MG/ML
SOLUTION/ DROPS OPHTHALMIC
Qty: 10 ML | Refills: 3 | Status: SHIPPED | OUTPATIENT
Start: 2023-12-12 | End: 2024-03-12

## 2023-12-12 NOTE — PROGRESS NOTES
Subjective:       Patient ID: Amara Reis is a 38 y.o. female.    Chief Complaint: Other (Pt c/o fluid in right ear for two weeks )      This patient presents with a long history of ear problems in particular she had a so called permanent tube placed on the right by a doctor in memory but he does not deal with tubes anymore, he just does balloon sinus surgery in his office apparently, and he as directed her this way she lives in [place].      She tells me that her right ear hurts a little bit but has been draining and she thinks the tube came out she saw some item with a hole in it come out not long ago and presumed that was the tube.            Objective:      ENT Physical Exam    On the right side there is some crusting in the ear canal and there is a blue T-type tube that appears to be through the eardrum but I can not see exactly where it goes through the eardrum because of the crusting and because she is sensitive to manipulation.  I do not see any wet purulence but the crusting is probably dried purulence and the inferior aspect of the ear canal I can see and it looks fairly good there is no redness.  I think the tube, if it is through the eardrum, is a little more anterior and superior than might be typical for placement.    The left ear looks fine     Nasal exam looks good    Oropharynx is normal             Assessment:       1. Recurrent acute suppurative otitis media of right ear without spontaneous rupture of tympanic membrane    2. Disorder of both eustachian tubes         Plan:          So she has a tube I think in good place on the right it maybe a little more superior than is typically placed and she is sensitive so it was hard to clean the little bit of crusting to get a full exam but I sent in some Cipro drops for the drainage she has been noting and she is going to let me know if this has not cleared things up.

## 2023-12-13 LAB
25(OH)D3 SERPL-MCNC: 32 NG/ML (ref 30–100)
ANA SER QL IF: NEGATIVE
CHOLEST SERPL-MCNC: 219 MG/DL
CHOLEST/HDLC SERPL: 4.8 (CALC)
HDLC SERPL-MCNC: 46 MG/DL
LDLC SERPL CALC-MCNC: 141 MG/DL (CALC)
NONHDLC SERPL-MCNC: 173 MG/DL (CALC)
TRIGL SERPL-MCNC: 180 MG/DL

## 2024-01-07 ENCOUNTER — OFFICE VISIT (OUTPATIENT)
Dept: URGENT CARE | Facility: CLINIC | Age: 39
End: 2024-01-07
Payer: COMMERCIAL

## 2024-01-07 VITALS
SYSTOLIC BLOOD PRESSURE: 155 MMHG | DIASTOLIC BLOOD PRESSURE: 103 MMHG | HEART RATE: 99 BPM | OXYGEN SATURATION: 99 % | RESPIRATION RATE: 18 BRPM | TEMPERATURE: 99 F

## 2024-01-07 DIAGNOSIS — M54.50 ACUTE BILATERAL LOW BACK PAIN WITHOUT SCIATICA: ICD-10-CM

## 2024-01-07 DIAGNOSIS — J02.9 SORE THROAT: ICD-10-CM

## 2024-01-07 DIAGNOSIS — R30.0 DYSURIA: ICD-10-CM

## 2024-01-07 DIAGNOSIS — Z20.818 EXPOSURE TO STREP THROAT: ICD-10-CM

## 2024-01-07 DIAGNOSIS — T36.95XA ANTIBIOTIC-INDUCED YEAST INFECTION: ICD-10-CM

## 2024-01-07 DIAGNOSIS — B37.9 ANTIBIOTIC-INDUCED YEAST INFECTION: ICD-10-CM

## 2024-01-07 DIAGNOSIS — Z20.828 EXPOSURE TO THE FLU: ICD-10-CM

## 2024-01-07 DIAGNOSIS — J03.90 EXUDATIVE TONSILLITIS: Primary | ICD-10-CM

## 2024-01-07 LAB
BILIRUB UR QL STRIP: NEGATIVE
CTP QC/QA: YES
CTP QC/QA: YES
FLUAV AG NPH QL: NEGATIVE
FLUBV AG NPH QL: NEGATIVE
GLUCOSE SERPL-MCNC: 109 MG/DL (ref 70–110)
GLUCOSE UR QL STRIP: NEGATIVE
KETONES UR QL STRIP: POSITIVE
LEUKOCYTE ESTERASE UR QL STRIP: NEGATIVE
PH, POC UA: 6
POC BLOOD, URINE: NEGATIVE
POC NITRATES, URINE: NEGATIVE
PROT UR QL STRIP: NEGATIVE
S PYO RRNA THROAT QL PROBE: NEGATIVE
SP GR UR STRIP: 1.02 (ref 1–1.03)
UROBILINOGEN UR STRIP-ACNC: 0.2 (ref 0.1–1.1)

## 2024-01-07 PROCEDURE — 81003 URINALYSIS AUTO W/O SCOPE: CPT | Mod: QW,S$GLB,,

## 2024-01-07 PROCEDURE — 99214 OFFICE O/P EST MOD 30 MIN: CPT | Mod: S$GLB,,,

## 2024-01-07 PROCEDURE — 87804 INFLUENZA ASSAY W/OPTIC: CPT | Mod: 59,QW,,

## 2024-01-07 PROCEDURE — 82962 GLUCOSE BLOOD TEST: CPT | Mod: ,,,

## 2024-01-07 PROCEDURE — 87880 STREP A ASSAY W/OPTIC: CPT | Mod: QW,,,

## 2024-01-07 RX ORDER — CEFUROXIME AXETIL 500 MG/1
500 TABLET ORAL EVERY 12 HOURS
Qty: 14 TABLET | Refills: 0 | Status: SHIPPED | OUTPATIENT
Start: 2024-01-07 | End: 2024-01-07

## 2024-01-07 RX ORDER — FLUCONAZOLE 150 MG/1
150 TABLET ORAL DAILY
Qty: 1 TABLET | Refills: 0 | Status: SHIPPED | OUTPATIENT
Start: 2024-01-07 | End: 2024-01-08

## 2024-01-07 RX ORDER — AMOXICILLIN 500 MG/1
500 TABLET, FILM COATED ORAL EVERY 12 HOURS
Qty: 20 TABLET | Refills: 0 | Status: SHIPPED | OUTPATIENT
Start: 2024-01-07 | End: 2024-01-17

## 2024-01-07 RX ORDER — FLUCONAZOLE 150 MG/1
150 TABLET ORAL DAILY
Qty: 1 TABLET | Refills: 0 | Status: SHIPPED | OUTPATIENT
Start: 2024-01-07 | End: 2024-01-07

## 2024-01-07 NOTE — PROGRESS NOTES
Subjective:      Patient ID: Amara Reis is a 38 y.o. female.    Vitals:  temperature is 99.1 °F (37.3 °C). Her blood pressure is 155/103 (abnormal) and her pulse is 99. Her respiration is 18 and oxygen saturation is 99%.     Chief Complaint: Sore Throat     has strep, daughter has flu. Concentrated urine and low back pain x several days. UA normal, culture sent.    Sore Throat   Pertinent negatives include no congestion, coughing or shortness of breath.       Constitution: Positive for chills, fatigue and fever.   HENT:  Positive for sore throat. Negative for congestion.    Neck: neck negative.   Cardiovascular: Negative.    Respiratory:  Negative for cough and shortness of breath.    Gastrointestinal: Negative.    Genitourinary:  Positive for dysuria, flank pain and hematuria.   Musculoskeletal:  Positive for back pain (low back).   Skin: Negative.    Neurological: Negative.    Psychiatric/Behavioral: Negative.        Objective:     Physical Exam   Constitutional: She is oriented to person, place, and time. She appears well-developed.   HENT:   Head: Normocephalic and atraumatic.   Ears:   Right Ear: External ear normal. A foreign body (tympanostomy tube) is present.   Left Ear: External ear normal. A middle ear effusion is present.   Nose: Nose normal. No nasal deformity. No epistaxis.   Mouth/Throat: Mucous membranes are normal. Mucous membranes are moist. Posterior oropharyngeal erythema present.   Eyes: Lids are normal.   Neck: Trachea normal and phonation normal. Neck supple.   Cardiovascular: Normal rate and regular rhythm.   Pulmonary/Chest: Effort normal. No respiratory distress. She has no wheezes. She has no rhonchi.   Abdominal: Normal appearance. Soft. There is no abdominal tenderness.   Musculoskeletal: Normal range of motion.         General: Normal range of motion.   Neurological: She is alert and oriented to person, place, and time. She displays no weakness.   Skin: Skin is warm, dry  and intact.   Psychiatric: Her speech is normal and behavior is normal. Mood, judgment and thought content normal.   Nursing note and vitals reviewed.      Assessment:     1. Exudative tonsillitis    2. Sore throat    3. Exposure to strep throat    4. Exposure to the flu    5. Dysuria    6. Acute bilateral low back pain without sciatica        Plan:       Exudative tonsillitis  -     amoxicillin (AMOXIL) 500 MG Tab; Take 1 tablet (500 mg total) by mouth every 12 (twelve) hours. for 10 days  Dispense: 20 tablet; Refill: 0  -     Upper Respiratory Culture, Routine    Sore throat  -     POCT Influenza A/B Rapid Antigen  -     POCT rapid strep A    Exposure to strep throat    Exposure to the flu    Dysuria  -     POCT Glucose, Hand-Held Device    Acute bilateral low back pain without sciatica  -     POCT Urinalysis, Dipstick, Automated, W/O Scope  -     CULTURE, URINE    Other orders  -     Discontinue: cefUROXime (CEFTIN) 500 MG tablet; Take 1 tablet (500 mg total) by mouth every 12 (twelve) hours. for 7 days  Dispense: 14 tablet; Refill: 0  -     Discontinue: fluconazole (DIFLUCAN) 150 MG Tab; Take 1 tablet (150 mg total) by mouth once daily. for 1 day  Dispense: 1 tablet; Refill: 0      UA: abnormal  Urine culture sent    FLU: neg  Strep: neg    Discussed medication with patient who acknowledges understanding and is agreeable to POC. Follow up with primary care. Increase fluid intake. Red flags for ER discussed.

## 2024-01-08 ENCOUNTER — OFFICE VISIT (OUTPATIENT)
Dept: FAMILY MEDICINE | Facility: CLINIC | Age: 39
End: 2024-01-08
Payer: COMMERCIAL

## 2024-01-08 ENCOUNTER — LAB VISIT (OUTPATIENT)
Dept: LAB | Facility: HOSPITAL | Age: 39
End: 2024-01-08
Attending: NURSE PRACTITIONER
Payer: COMMERCIAL

## 2024-01-08 ENCOUNTER — LAB VISIT (OUTPATIENT)
Dept: LAB | Facility: HOSPITAL | Age: 39
End: 2024-01-08
Payer: COMMERCIAL

## 2024-01-08 ENCOUNTER — PATIENT MESSAGE (OUTPATIENT)
Dept: FAMILY MEDICINE | Facility: CLINIC | Age: 39
End: 2024-01-08

## 2024-01-08 VITALS
WEIGHT: 159 LBS | RESPIRATION RATE: 14 BRPM | BODY MASS INDEX: 31.22 KG/M2 | SYSTOLIC BLOOD PRESSURE: 126 MMHG | HEIGHT: 60 IN | HEART RATE: 80 BPM | OXYGEN SATURATION: 98 % | TEMPERATURE: 99 F | DIASTOLIC BLOOD PRESSURE: 82 MMHG

## 2024-01-08 DIAGNOSIS — R73.09 ELEVATED RANDOM BLOOD GLUCOSE LEVEL: Primary | ICD-10-CM

## 2024-01-08 DIAGNOSIS — R79.89 ELEVATED LFTS: Primary | ICD-10-CM

## 2024-01-08 DIAGNOSIS — R73.09 ELEVATED RANDOM BLOOD GLUCOSE LEVEL: ICD-10-CM

## 2024-01-08 DIAGNOSIS — R30.0 DYSURIA: ICD-10-CM

## 2024-01-08 LAB
ALBUMIN SERPL BCP-MCNC: 4.2 G/DL (ref 3.5–5.2)
ALP SERPL-CCNC: 74 U/L (ref 55–135)
ALT SERPL W/O P-5'-P-CCNC: 185 U/L (ref 10–44)
ANION GAP SERPL CALC-SCNC: 11 MMOL/L (ref 8–16)
AST SERPL-CCNC: 99 U/L (ref 10–40)
BILIRUB SERPL-MCNC: 0.8 MG/DL (ref 0.1–1)
BUN SERPL-MCNC: 11 MG/DL (ref 6–20)
CALCIUM SERPL-MCNC: 9.8 MG/DL (ref 8.7–10.5)
CHLORIDE SERPL-SCNC: 104 MMOL/L (ref 95–110)
CO2 SERPL-SCNC: 24 MMOL/L (ref 23–29)
CREAT SERPL-MCNC: 0.8 MG/DL (ref 0.5–1.4)
EST. GFR  (NO RACE VARIABLE): >60 ML/MIN/1.73 M^2
ESTIMATED AVG GLUCOSE: 91 MG/DL (ref 68–131)
GLUCOSE SERPL-MCNC: 124 MG/DL (ref 70–110)
HBA1C MFR BLD: 4.8 % (ref 4–5.6)
POTASSIUM SERPL-SCNC: 4.2 MMOL/L (ref 3.5–5.1)
PROT SERPL-MCNC: 8.1 G/DL (ref 6–8.4)
SODIUM SERPL-SCNC: 139 MMOL/L (ref 136–145)

## 2024-01-08 PROCEDURE — 87086 URINE CULTURE/COLONY COUNT: CPT | Performed by: NURSE PRACTITIONER

## 2024-01-08 PROCEDURE — 3074F SYST BP LT 130 MM HG: CPT | Mod: CPTII,S$GLB,, | Performed by: NURSE PRACTITIONER

## 2024-01-08 PROCEDURE — 99213 OFFICE O/P EST LOW 20 MIN: CPT | Mod: S$GLB,,, | Performed by: NURSE PRACTITIONER

## 2024-01-08 PROCEDURE — 99999 PR PBB SHADOW E&M-EST. PATIENT-LVL IV: CPT | Mod: PBBFAC,,, | Performed by: NURSE PRACTITIONER

## 2024-01-08 PROCEDURE — 3079F DIAST BP 80-89 MM HG: CPT | Mod: CPTII,S$GLB,, | Performed by: NURSE PRACTITIONER

## 2024-01-08 PROCEDURE — 3044F HG A1C LEVEL LT 7.0%: CPT | Mod: CPTII,S$GLB,, | Performed by: NURSE PRACTITIONER

## 2024-01-08 PROCEDURE — 80053 COMPREHEN METABOLIC PANEL: CPT | Performed by: NURSE PRACTITIONER

## 2024-01-08 PROCEDURE — 36415 COLL VENOUS BLD VENIPUNCTURE: CPT | Performed by: NURSE PRACTITIONER

## 2024-01-08 PROCEDURE — 3008F BODY MASS INDEX DOCD: CPT | Mod: CPTII,S$GLB,, | Performed by: NURSE PRACTITIONER

## 2024-01-08 PROCEDURE — 83036 HEMOGLOBIN GLYCOSYLATED A1C: CPT | Performed by: NURSE PRACTITIONER

## 2024-01-08 NOTE — PROGRESS NOTES
Patient ID: Amara Reis is a 38 y.o. female.    Chief Complaint: lab (Had labs done at urgent care, sugar in urine) and Follow-up    Mrs. Maradiaga presents today for follow up after being seen in the urgent care setting. While there she was found to have several elevated readings in blood work and was told to follow up with PCP. She is here to have repeat blood work done at this time.      Past Medical History:   Diagnosis Date    Anemia     Clostridium difficile colitis     hx--reoccurs with antibiotic use    Cystitis 05/20/2014    Endometriosis     Glucose intolerance     Ovarian cyst     Ovarian cyst 05/20/2014    Pelvic pain in female     Sleep apnea, unspecified     Thyroid disease     Thyroid nodule    Urinary tract infection, site not specified      Past Surgical History:   Procedure Laterality Date    EXCISION OF LESION OF NOSE Bilateral 2/18/2019    Procedure: EXCISION, LESION, NOSE;  Surgeon: Chadd Ward MD;  Location: [facility] OR;  Service: ENT;  Laterality: Bilateral;    LASER LAPAROSCOPY  07/01/2013    pt has had 3 surgies     MYOMECTOMY      MYRINGOTOMY WITH INSERTION OF VENTILATION TUBE Right 2/18/2019    Procedure: MYRINGOTOMY, WITH TYMPANOSTOMY TUBE INSERTION;  Surgeon: Chadd Ward MD;  Location: [facility] OR;  Service: ENT;  Laterality: Right;    [place] EXPLOR[place]ORY OF ABDOM[place]  2012    [place] EXPLORATORY OF A[place]OMEN  04/15/2014    VAGINAL DELIVERY      x2 normal         Tobacco History:  reports that she has never smoked. She has never been exposed to tobacco smoke. She has never used smokeless tobacco.      Review of patient's allergies indicates:   Allergen Reactions    Morphine Hives, Itching and Nausea And Vomiting    Flagyl [metronidazole hcl] Other (See Comments)     Oral form only--burns her   stomach. Take IV only.  Had to have surgery to fix this       Current Outpatient Medications:     albuterol (VENTOLIN HFA) 90 mcg/actuation inhaler, Inhale 2 puffs into the lungs every 6 (six) hours as  needed for Wheezing. Rescue, Disp: 18 g, Rfl: 1    azelastine (ASTELIN) 137 mcg (0.1 %) nasal spray, USE 1 SPRAY IN EACH NOSTRIL EVERY MORNING AND 1 SPRAY IN EACH NOSTRIL BEFORE BEDTIME AS DIRECTED, Disp: 30 mL, Rfl: 3    cetirizine (ZYRTEC) 10 MG tablet, Take 1 tablet (10 mg total) by mouth once daily., Disp: 90 tablet, Rfl: 1    ciprofloxacin HCl (CILOXAN) 0.3 % ophthalmic solution, 3 drops into affected ear tid... EAR, Disp: 10 mL, Rfl: 3    multivit 47-iron-folate 1-dha (WESCAP-PN DHA) 27 mg iron-1 mg -300 mg Cap, Take 1 tablet by mouth once daily., Disp: , Rfl:     tranexamic acid (LYSTEDA) 650 mg tablet, TAKE TWO TABLETS BY MOUTH THREE TIMES DAILY FOR THE 1ST 5 DAYS OF CYCLE, Disp: , Rfl:     benzonatate (TESSALON) 100 MG capsule, Take 1 capsule (100 mg total) by mouth 3 (three) times daily as needed for Cough., Disp: 30 capsule, Rfl: 0    fluconazole (DIFLUCAN) 150 MG Tab, Take 150 mg by mouth once., Disp: , Rfl:     promethazine-dextromethorphan (PROMETHAZINE-DM) 6.25-15 mg/5 mL Syrp, Take 5 mLs by mouth nightly as needed (cough)., Disp: 118 mL, Rfl: 0  No current facility-administered medications for this visit.    Facility-Administered Medications Ordered in Other Visits:     lactated ringers infusion, , Intravenous, Continuous, Chadd Ward MD, Stopped at 02/18/19 0746    sodium chloride 0.9% flush 3 mL, 3 mL, Intravenous, PRN, Chadd Ward MD    Review of Systems   Constitutional:  Positive for activity change and fatigue.   Genitourinary:  Positive for dysuria.          Objective:      Vitals:    01/08/24 1014   BP: 126/82   Pulse: 80   Resp: 14   Temp: 99 °F (37.2 °C)   SpO2: 98%   Weight: 72.1 kg (159 lb)   Height: 5' (1.524 m)     Physical Exam  Constitutional:       Appearance: Normal appearance.   Cardiovascular:      Rate and Rhythm: Normal rate and regular rhythm.      Heart sounds: Normal heart sounds.   Pulmonary:      Breath sounds: Normal breath sounds.   Abdominal:      General:  Bowel sounds are normal.      Palpations: Abdomen is soft.   Skin:     General: Skin is warm.   Neurological:      Mental Status: She is alert and oriented to person, place, and time. Mental status is at baseline.   Psychiatric:         Mood and Affect: Mood normal.         Behavior: Behavior normal.         Thought Content: Thought content normal.           Assessment:       1. Elevated random blood glucose level    2. Dysuria           Plan:       Elevated random blood glucose level  -     COMPREHENSIVE METABOLIC PANEL; Future; Expected date: 01/08/2024  -     HEMOGLOBIN A1C; Future; Expected date: 01/08/2024    Dysuria  -     POCT Urinalysis, Dipstick, Automated, W/O Scope  -     CULTURE, URINE; Future; Expected date: 01/08/2024      No follow-ups on file.        1/21/2024 Sejal Guerrero NP

## 2024-01-09 ENCOUNTER — LAB VISIT (OUTPATIENT)
Dept: LAB | Facility: HOSPITAL | Age: 39
End: 2024-01-09
Attending: NURSE PRACTITIONER
Payer: COMMERCIAL

## 2024-01-09 DIAGNOSIS — R79.89 ELEVATED LFTS: ICD-10-CM

## 2024-01-09 LAB
BACTERIA UR CULT: NO GROWTH
BASOPHILS # BLD AUTO: 0.03 K/UL (ref 0–0.2)
BASOPHILS NFR BLD: 0.3 % (ref 0–1.9)
DIFFERENTIAL METHOD BLD: ABNORMAL
EOSINOPHIL # BLD AUTO: 0.1 K/UL (ref 0–0.5)
EOSINOPHIL NFR BLD: 0.6 % (ref 0–8)
ERYTHROCYTE [DISTWIDTH] IN BLOOD BY AUTOMATED COUNT: 12.9 % (ref 11.5–14.5)
FERRITIN SERPL-MCNC: 55 NG/ML (ref 20–300)
HAV IGM SERPL QL IA: NORMAL
HBV CORE IGM SERPL QL IA: NORMAL
HBV SURFACE AG SERPL QL IA: NORMAL
HCT VFR BLD AUTO: 39.3 % (ref 37–48.5)
HCV AB SERPL QL IA: NORMAL
HGB BLD-MCNC: 12.5 G/DL (ref 12–16)
IMM GRANULOCYTES # BLD AUTO: 0.02 K/UL (ref 0–0.04)
IMM GRANULOCYTES NFR BLD AUTO: 0.2 % (ref 0–0.5)
IRON SERPL-MCNC: 38 UG/DL (ref 30–160)
LYMPHOCYTES # BLD AUTO: 1.8 K/UL (ref 1–4.8)
LYMPHOCYTES NFR BLD: 20.1 % (ref 18–48)
MCH RBC QN AUTO: 28.3 PG (ref 27–31)
MCHC RBC AUTO-ENTMCNC: 31.8 G/DL (ref 32–36)
MCV RBC AUTO: 89 FL (ref 82–98)
MONOCYTES # BLD AUTO: 0.5 K/UL (ref 0.3–1)
MONOCYTES NFR BLD: 5.2 % (ref 4–15)
NEUTROPHILS # BLD AUTO: 6.7 K/UL (ref 1.8–7.7)
NEUTROPHILS NFR BLD: 73.6 % (ref 38–73)
NRBC BLD-RTO: 0 /100 WBC
PLATELET # BLD AUTO: 272 K/UL (ref 150–450)
PMV BLD AUTO: 11 FL (ref 9.2–12.9)
RBC # BLD AUTO: 4.41 M/UL (ref 4–5.4)
SATURATED IRON: 9 % (ref 20–50)
TOTAL IRON BINDING CAPACITY: 441 UG/DL (ref 250–450)
TRANSFERRIN SERPL-MCNC: 298 MG/DL (ref 200–375)
WBC # BLD AUTO: 9.07 K/UL (ref 3.9–12.7)

## 2024-01-09 PROCEDURE — 36415 COLL VENOUS BLD VENIPUNCTURE: CPT | Performed by: NURSE PRACTITIONER

## 2024-01-09 PROCEDURE — 80074 ACUTE HEPATITIS PANEL: CPT | Performed by: NURSE PRACTITIONER

## 2024-01-09 PROCEDURE — 83540 ASSAY OF IRON: CPT | Performed by: NURSE PRACTITIONER

## 2024-01-09 PROCEDURE — 82728 ASSAY OF FERRITIN: CPT | Performed by: NURSE PRACTITIONER

## 2024-01-09 PROCEDURE — 85025 COMPLETE CBC W/AUTO DIFF WBC: CPT | Performed by: NURSE PRACTITIONER

## 2024-01-10 LAB
BACTERIA UR CULT: NO GROWTH
BACTERIA UR CULT: NORMAL

## 2024-01-11 LAB
BACTERIA SPEC RESP CULT: NORMAL
BACTERIA SPEC RESP CULT: NORMAL

## 2024-01-13 ENCOUNTER — OFFICE VISIT (OUTPATIENT)
Dept: URGENT CARE | Facility: CLINIC | Age: 39
End: 2024-01-13
Payer: COMMERCIAL

## 2024-01-13 ENCOUNTER — HOSPITAL ENCOUNTER (OUTPATIENT)
Dept: RADIOLOGY | Facility: HOSPITAL | Age: 39
Discharge: HOME | End: 2024-01-13
Attending: NURSE PRACTITIONER
Payer: COMMERCIAL

## 2024-01-13 VITALS
HEIGHT: 60 IN | WEIGHT: 159 LBS | HEART RATE: 105 BPM | DIASTOLIC BLOOD PRESSURE: 101 MMHG | TEMPERATURE: 99 F | OXYGEN SATURATION: 100 % | BODY MASS INDEX: 31.22 KG/M2 | SYSTOLIC BLOOD PRESSURE: 157 MMHG | RESPIRATION RATE: 18 BRPM

## 2024-01-13 DIAGNOSIS — B34.9 VIRAL SYNDROME: ICD-10-CM

## 2024-01-13 DIAGNOSIS — J02.9 SORE THROAT: Primary | ICD-10-CM

## 2024-01-13 DIAGNOSIS — R79.89 ELEVATED LFTS: ICD-10-CM

## 2024-01-13 PROBLEM — G47.33 OSA ON CPAP: Status: ACTIVE | Noted: 2022-10-13

## 2024-01-13 PROBLEM — I10 PRIMARY HYPERTENSION: Status: ACTIVE | Noted: 2023-01-17

## 2024-01-13 LAB
CTP QC/QA: YES
CTP QC/QA: YES
FLUAV AG NPH QL: NEGATIVE
FLUBV AG NPH QL: NEGATIVE
SARS-COV-2 AG RESP QL IA.RAPID: NEGATIVE

## 2024-01-13 PROCEDURE — 76700 US EXAM ABDOM COMPLETE: CPT | Mod: 26,,, | Performed by: RADIOLOGY

## 2024-01-13 PROCEDURE — 76700 US EXAM ABDOM COMPLETE: CPT | Mod: TC

## 2024-01-13 PROCEDURE — 99214 OFFICE O/P EST MOD 30 MIN: CPT | Mod: S$GLB,,, | Performed by: NURSE PRACTITIONER

## 2024-01-13 PROCEDURE — 87811 SARS-COV-2 COVID19 W/OPTIC: CPT | Mod: QW,S$GLB,, | Performed by: NURSE PRACTITIONER

## 2024-01-13 PROCEDURE — 87804 INFLUENZA ASSAY W/OPTIC: CPT | Mod: QW,,, | Performed by: NURSE PRACTITIONER

## 2024-01-13 RX ORDER — FLUCONAZOLE 150 MG/1
150 TABLET ORAL ONCE
COMMUNITY
Start: 2024-01-08

## 2024-01-13 RX ORDER — BENZONATATE 100 MG/1
100 CAPSULE ORAL 3 TIMES DAILY PRN
Qty: 30 CAPSULE | Refills: 0 | Status: SHIPPED | OUTPATIENT
Start: 2024-01-13 | End: 2024-01-23

## 2024-01-13 RX ORDER — PROMETHAZINE HYDROCHLORIDE AND DEXTROMETHORPHAN HYDROBROMIDE 6.25; 15 MG/5ML; MG/5ML
5 SYRUP ORAL NIGHTLY PRN
Qty: 118 ML | Refills: 0 | Status: SHIPPED | OUTPATIENT
Start: 2024-01-13

## 2024-01-13 NOTE — PROGRESS NOTES
Subjective:      Patient ID: Amara Reis is a 38 y.o. female.    Vitals:  height is 5' (1.524 m) and weight is 72.1 kg (159 lb). Her temperature is 99.2 °F (37.3 °C). Her blood pressure is 157/101 (abnormal) and her pulse is 105. Her respiration is 18 and oxygen saturation is 100%.     Chief Complaint: Sore Throat and Sinus Problem    [place] [facility] [place]    Patient seen on Sunday, January 7th with exudative tonsillitis.  Had had been exposed to strep and flu in the household.  Test were negative however started on amoxicillin and a throat culture was obtained.  Throat culture results do indicate that there was negative for strep throat.  Patient states she continue to take the amoxicillin and returns to clinic today with complaints of worsening symptoms of sore throat which now feels like she swallowing glass however now she has new onset of symptoms with cough and shortness a breath for 2 days    Sore Throat   This is a new problem. The current episode started in the past 7 days. The problem has been gradually worsening. Associated symptoms include congestion, coughing, diarrhea, headaches, a plugged ear sensation, shortness of breath and trouble swallowing. Pertinent negatives include no ear pain or vomiting. The treatment provided no relief.   Sinus Problem  This is a new problem. The current episode started today. The problem has been gradually worsening since onset. Associated symptoms include chills, congestion, coughing, headaches, shortness of breath and a sore throat. Pertinent negatives include no ear pain. Past treatments include antibiotics, acetaminophen, oral decongestants and nasal decongestants. The treatment provided no relief.       Constitution: Positive for appetite change, chills, fatigue and fever.   HENT:  Positive for congestion, sore throat and trouble swallowing. Negative for ear pain.    Cardiovascular:  Negative for chest pain.   Respiratory:  Positive for chest  tightness, cough, shortness of breath and asthma. Negative for sputum production.    Gastrointestinal:  Positive for diarrhea. Negative for nausea and vomiting.   Musculoskeletal:  Positive for muscle ache.   Skin:  Negative for rash.   Allergic/Immunologic: Positive for asthma.   Neurological:  Positive for headaches.      Objective:     Physical Exam   Constitutional: She is oriented to person, place, and time. She is cooperative.  Non-toxic appearance. She appears ill. No distress. awake  HENT:   Head: Normocephalic and atraumatic.   Ears:   Right Ear: Tympanic membrane, external ear and ear canal normal.   Left Ear: Tympanic membrane, external ear and ear canal normal.   Nose: Rhinorrhea and congestion present.   Mouth/Throat: Mucous membranes are moist. Posterior oropharyngeal erythema present. No oropharyngeal exudate.   Eyes: Conjunctivae are normal. Right eye exhibits no discharge. Left eye exhibits no discharge.   Neck: Neck supple. No neck rigidity present.   Cardiovascular: Normal rate, regular rhythm and normal heart sounds.   Pulmonary/Chest: Effort normal and breath sounds normal. No accessory muscle usage. No tachypnea. No respiratory distress. She has no wheezes. She has no rhonchi. She has no rales. She exhibits no tenderness.   Abdominal: Normal appearance.   Musculoskeletal:      Cervical back: She exhibits no tenderness.   Lymphadenopathy:     She has no cervical adenopathy.   Neurological: no focal deficit. She is alert and oriented to person, place, and time. No sensory deficit.   Skin: Skin is warm, dry, not diaphoretic and no rash. Capillary refill takes 2 to 3 seconds.   Psychiatric: Her behavior is normal. Mood normal.   Nursing note and vitals reviewed.      Assessment:     1. Sore throat    2. Viral syndrome      COVID negative  Flu a/B negative    Plan:       Sore throat  -     SARS Coronavirus 2 Antigen, POCT Manual Read  -     POCT Influenza A/B Rapid Antigen    Viral syndrome  -      benzonatate (TESSALON) 100 MG capsule; Take 1 capsule (100 mg total) by mouth 3 (three) times daily as needed for Cough.  Dispense: 30 capsule; Refill: 0  -     promethazine-dextromethorphan (PROMETHAZINE-DM) 6.25-15 mg/5 mL Syrp; Take 5 mLs by mouth nightly as needed (cough).  Dispense: 118 mL; Refill: 0

## 2024-01-13 NOTE — PATIENT INSTRUCTIONS
Symptomatic treatment to include:    Rest, increase fluid intake to thin mucus, include electrolyte replacement  Ibuprofen/Tylenol as directed for fever, sore throat, body aches  Continue Flonase and Zyrtec daily until symptoms have resolved  Mucinex D from behind the pharmacy counter as directed for relief of nasal/sinus congestion  Tessalon Perles cough pills best use for cough caused by postnasal drip/throat irritation  Phenergan cough syrup at night only.  Can take together with Tessalon Perles for added benefit  Refrain from using Afrin nasal spray or any decongestants as this will thicken mucous   Nasal saline spray several times a day  or nasal wash systems  Warm, salt water gargles, over the counter throat lozenges or sprays for sore throat.

## 2024-03-12 ENCOUNTER — OFFICE VISIT (OUTPATIENT)
Dept: OBSTETRICS AND GYNECOLOGY | Facility: CLINIC | Age: 39
End: 2024-03-12
Payer: COMMERCIAL

## 2024-03-12 VITALS
WEIGHT: 158.94 LBS | BODY MASS INDEX: 31.2 KG/M2 | SYSTOLIC BLOOD PRESSURE: 154 MMHG | HEART RATE: 79 BPM | HEIGHT: 60 IN | DIASTOLIC BLOOD PRESSURE: 74 MMHG

## 2024-03-12 DIAGNOSIS — Z11.3 SCREENING EXAMINATION FOR SEXUALLY TRANSMITTED DISEASE: Primary | ICD-10-CM

## 2024-03-12 DIAGNOSIS — N89.8 VAGINAL DISCHARGE: ICD-10-CM

## 2024-03-12 PROBLEM — N76.1 CHRONIC VAGINITIS: Status: RESOLVED | Noted: 2017-12-19 | Resolved: 2024-03-12

## 2024-03-12 PROBLEM — D25.9 UTERINE LEIOMYOMA: Status: RESOLVED | Noted: 2017-01-18 | Resolved: 2024-03-12

## 2024-03-12 PROBLEM — J34.89 OVERDEVELOPMENT OF NASAL BONES: Status: RESOLVED | Noted: 2019-02-18 | Resolved: 2024-03-12

## 2024-03-12 PROBLEM — E28.2 PCOS (POLYCYSTIC OVARIAN SYNDROME): Status: RESOLVED | Noted: 2017-01-18 | Resolved: 2024-03-12

## 2024-03-12 PROBLEM — L70.9 ACNE: Status: RESOLVED | Noted: 2017-08-23 | Resolved: 2024-03-12

## 2024-03-12 PROBLEM — N92.6 IRREGULAR MENSTRUAL CYCLE: Status: RESOLVED | Noted: 2017-08-23 | Resolved: 2024-03-12

## 2024-03-12 PROBLEM — H69.93 DISORDER OF BOTH EUSTACHIAN TUBES: Status: RESOLVED | Noted: 2019-02-18 | Resolved: 2024-03-12

## 2024-03-12 PROBLEM — O36.5990 IUGR, ANTENATAL: Status: RESOLVED | Noted: 2022-08-12 | Resolved: 2024-03-12

## 2024-03-12 PROBLEM — J34.3 HYPERTROPHY OF NASAL TURBINATES: Status: RESOLVED | Noted: 2019-02-18 | Resolved: 2024-03-12

## 2024-03-12 PROBLEM — N97.9 FEMALE FERTILITY PROBLEM: Status: RESOLVED | Noted: 2017-01-18 | Resolved: 2024-03-12

## 2024-03-12 PROCEDURE — 99999 PR PBB SHADOW E&M-EST. PATIENT-LVL III: CPT | Mod: PBBFAC,,, | Performed by: STUDENT IN AN ORGANIZED HEALTH CARE EDUCATION/TRAINING PROGRAM

## 2024-03-12 PROCEDURE — 1159F MED LIST DOCD IN RCRD: CPT | Mod: CPTII,S$GLB,, | Performed by: STUDENT IN AN ORGANIZED HEALTH CARE EDUCATION/TRAINING PROGRAM

## 2024-03-12 PROCEDURE — 99459 PELVIC EXAMINATION: CPT | Mod: S$GLB,,, | Performed by: STUDENT IN AN ORGANIZED HEALTH CARE EDUCATION/TRAINING PROGRAM

## 2024-03-12 PROCEDURE — 99203 OFFICE O/P NEW LOW 30 MIN: CPT | Mod: 25,S$GLB,, | Performed by: STUDENT IN AN ORGANIZED HEALTH CARE EDUCATION/TRAINING PROGRAM

## 2024-03-12 PROCEDURE — 3008F BODY MASS INDEX DOCD: CPT | Mod: CPTII,S$GLB,, | Performed by: STUDENT IN AN ORGANIZED HEALTH CARE EDUCATION/TRAINING PROGRAM

## 2024-03-12 PROCEDURE — 81514 NFCT DS BV&VAGINITIS DNA ALG: CPT | Performed by: STUDENT IN AN ORGANIZED HEALTH CARE EDUCATION/TRAINING PROGRAM

## 2024-03-12 PROCEDURE — 87491 CHLMYD TRACH DNA AMP PROBE: CPT | Performed by: STUDENT IN AN ORGANIZED HEALTH CARE EDUCATION/TRAINING PROGRAM

## 2024-03-12 PROCEDURE — 3044F HG A1C LEVEL LT 7.0%: CPT | Mod: CPTII,S$GLB,, | Performed by: STUDENT IN AN ORGANIZED HEALTH CARE EDUCATION/TRAINING PROGRAM

## 2024-03-12 PROCEDURE — 3078F DIAST BP <80 MM HG: CPT | Mod: CPTII,S$GLB,, | Performed by: STUDENT IN AN ORGANIZED HEALTH CARE EDUCATION/TRAINING PROGRAM

## 2024-03-12 PROCEDURE — 1160F RVW MEDS BY RX/DR IN RCRD: CPT | Mod: CPTII,S$GLB,, | Performed by: STUDENT IN AN ORGANIZED HEALTH CARE EDUCATION/TRAINING PROGRAM

## 2024-03-12 PROCEDURE — 3077F SYST BP >= 140 MM HG: CPT | Mod: CPTII,S$GLB,, | Performed by: STUDENT IN AN ORGANIZED HEALTH CARE EDUCATION/TRAINING PROGRAM

## 2024-03-12 RX ORDER — METRONIDAZOLE 7.5 MG/G
GEL VAGINAL
Qty: 70 G | Refills: 0 | Status: SHIPPED | OUTPATIENT
Start: 2024-03-12 | End: 2024-05-16 | Stop reason: ALTCHOICE

## 2024-03-12 NOTE — PROGRESS NOTES
[facility] Note    Subjective:     Chief Complaint: STD CHECK      HPI:  2024    Amara Reis is a 38 y.o. female  presents for STD screening.  Patient's last menstrual period was 2024 (exact date).  Denies any new rashes or lesions.  Denies pelvic or abdominal pain.  Denies vulvovaginal itching or irritation.  Denies abnormal or foul vaginal discharge.    Her partner was diagnosed with epididymo-orchitis and patient worries that he has an STD because she saw where her significant other had possible GC testing. I reviewed his recent note and results. No GC testing results were in the chart. He was treated with levofloxacin.     Cycles are monthly lasting 4 days and associated with severe cramping with her cycles (motrin helps). She takes tranexamic acid to keep the cycle short and to reduce menstrual flow. Now cycles are not as heavy. No issues with her cycles.     GYN & OB History  Patient's last menstrual period was 2024 (exact date).     Date of Last Pap: 2022, negative for intraepithelial lesion or malignancy, HPV negative. Due in .     OB History    Para Term  AB Living   5 3 3   2 3   SAB IAB Ectopic Multiple Live Births   2     0 3      # Outcome Date GA Lbr Black/2nd Weight Sex Delivery Anes PTL Lv   5 Term 22 38w4d 11:45 / 00:37 2.761 kg (6 lb 1.4 oz) F Vag-Spont EPI N ANGUS   4 SAB 2020           3 Term 2011 39w0d  3.175 kg (7 lb) F Vag-Spont  N ANGUS   2 Term 10/2008 37w0d  2.778 kg (6 lb 2 oz) M Vag-Spont   ANGUS   1 SAB                Past Medical History:   Diagnosis Date    Anemia     Clostridium difficile colitis     hx--reoccurs with antibiotic use    Cystitis 2014    Endometriosis     Glucose intolerance     Hypertrophy of nasal turbinates 2019    Intramural leiomyoma of uterus 2016    Ovarian cyst     Ovarian cyst 2014    Pelvic pain in female     Sleep apnea, unspecified     Thyroid  No disease     Thyroid nodule    Urinary tract infection, site not specified      Past Surgical History:   Procedure Laterality Date    DILATION AND CURETTAGE OF UTERUS      Due to miscarriage.    EXCISION OF LESION OF NOSE Bilateral 02/18/2019    Procedure: EXCISION, LESION, NOSE;  Surgeon: Chadd Ward MD;  Location: [facility] OR;  Service: ENT;  Laterality: Bilateral;    LASER LAPAROSCOPY  07/01/2013    pt has had 3 surgies     MYOMECTOMY  2013    Lasered endometriosis.    MYRINGOTOMY WITH INSERTION OF VENTILATION TUBE Right 02/18/2019    Procedure: MYRINGOTOMY, WITH TYMPANOSTOMY TUBE INSERTION;  Surgeon: Chadd Ward MD;  Location: [facility] OR;  Service: ENT;  Laterality: Right;    [place] EXPLORATORY OF ABDOMEN  2012    [place] EXPLORATORY OF ABDOMEN  04/15/2014    VAGINAL DELIVERY      x2 normal     Review of patient's allergies indicates:   Allergen Reactions    Morphine Hives, Itching and Nausea And Vomiting    Flagyl [metronidazole hcl] Other (See Comments)     Oral form only--burns her   stomach. Take IV only.  Had to have surgery to fix this       She reports that she has never smoked. She has never been exposed to tobacco smoke. She has never used smokeless tobacco. She reports that she does not drink alcohol and does not use drugs.    Her family history includes Fibromyalgia in her mother; Hypertension in her father and mother; Miscarriages / Stillbirths in her mother; Thyroid disease in her brother.    Medications    Current Outpatient Medications on File Prior to Visit   Medication Sig Dispense Refill Last Dose    multivit 47-iron-folate 1-dha (WESCAP-PN DHA) 27 mg iron-1 mg -300 mg Cap Take 1 tablet by mouth once daily.   Taking    tranexamic acid (LYSTEDA) 650 mg tablet TAKE TWO TABLETS BY MOUTH THREE TIMES DAILY FOR THE 1ST 5 DAYS OF CYCLE   Taking    [DISCONTINUED] albuterol (VENTOLIN HFA) 90 mcg/actuation inhaler Inhale 2 puffs into the lungs every 6 (six) hours as needed for Wheezing. Rescue (Patient not  taking: Reported on 3/12/2024) 18 g 1 Not Taking    [DISCONTINUED] azelastine (ASTELIN) 137 mcg (0.1 %) nasal spray USE 1 SPRAY IN EACH NOSTRIL EVERY MORNING AND 1 SPRAY IN EACH NOSTRIL BEFORE BEDTIME AS DIRECTED (Patient not taking: Reported on 3/12/2024) 30 mL 3 Not Taking    [DISCONTINUED] cetirizine (ZYRTEC) 10 MG tablet Take 1 tablet (10 mg total) by mouth once daily. (Patient not taking: Reported on 3/12/2024) 90 tablet 1 Not Taking    [DISCONTINUED] ciprofloxacin HCl (CILOXAN) 0.3 % ophthalmic solution 3 drops into affected ear tid... EAR 10 mL 3     [DISCONTINUED] fluconazole (DIFLUCAN) 150 MG Tab Take 150 mg by mouth once.       [DISCONTINUED] promethazine-dextromethorphan (PROMETHAZINE-DM) 6.25-15 mg/5 mL Syrp Take 5 mLs by mouth nightly as needed (cough). 118 mL 0          Review of Systems   Constitutional: Negative for appetite change, fever and unexpected weight change.   Respiratory: Negative for cough and shortness of breath.    Cardiovascular: Negative for chest pain and palpitations.   Genitourinary: Negative for dyspareunia, dysuria, hematuria and pelvic pain.        GYN ROS per HPI.   Psychiatric/Behavioral: The patient is not nervous/anxious.      Objective:      BP (!) 154/74 (BP Location: Right arm, Patient Position: Sitting, BP Method: Medium (Automatic))   Pulse 79   Ht 5' (1.524 m)   Wt 72.1 kg (158 lb 15.2 oz)   LMP 02/18/2024 (Exact Date)   BMI 31.04 kg/m²     Physical Exam  Chaperone present: Female chaperone present.   Constitutional:       General: She is not in acute distress.  HENT:      Head: Normocephalic.   Eyes:      General: No scleral icterus.  Genitourinary:     Pubic Area: No rash.       Labia:         Right: No rash or tenderness.         Left: No rash or tenderness.       Vagina: No tenderness or bleeding.      Cervix: No cervical motion tenderness.      Uterus: Not tender.       Adnexa:         Right: No mass or tenderness.          Left: No mass or tenderness.         Comments: Yellow mucoid discharge in the cervix.   Neurological:      General: No focal deficit present.      Mental Status: She is alert.   Psychiatric:         Mood and Affect: Mood normal.         Assessment:     1. Screening examination for sexually transmitted disease    2. Vaginal discharge      Plan:     1. Screening examination for sexually transmitted disease  - C. trachomatis/N. gonorrhoeae by AMP DNA  - VAGINOSIS SCREEN BY DNA PROBE    2. Vaginal discharge  - metroNIDAZOLE (METROGEL) 0.75 % (37.5mg/5 gram) vaginal gel; Place one applicatorful once daily into the vagina at bedtime for 5 days.  Dispense: 70 g; Refill: 0    We discussed the findings on physical exam compatible with bacterial vaginosis.  Discussed the imbalance of the vaginal environment and overgrowth of bacteria replacing vaginal luigi. It will continue to be a problem until the vaginal enviroment is restored. Potential causes: menses, stress, intercourse, items in the vagina or recent antibiotic use.  Vaginosis panel was obtained, and the patient is being started on topical metronidazole which she can tolerate.  The pros, cons, risks, benefits, alternatives and indications of the medication(s) prescribed, as well as appropriate use and potential side effects were discussed.     She declined STI bloodwork.     Patient was counseled today on prevention of STDs with use of condoms.  We also reviewed A.C.S. Pap guidelines and recommendations for yearly pelvic exams and mammograms. She will see her PCP for other health maintenance.     Follow up in about 1 year (around 3/12/2025) for annual exam or sooner if any GYN issues arise.  Follow-up sooner if needed.   Patient will be notified when labs return and further recommendations will be given at that time if necessary.     Demi Wild PA-C  03/12/2024

## 2024-03-13 LAB
BACTERIAL VAGINOSIS DNA: NEGATIVE
C TRACH DNA SPEC QL NAA+PROBE: NOT DETECTED
CANDIDA GLABRATA DNA: NEGATIVE
CANDIDA KRUSEI DNA: NEGATIVE
CANDIDA RRNA VAG QL PROBE: NEGATIVE
N GONORRHOEA DNA SPEC QL NAA+PROBE: NOT DETECTED
T VAGINALIS RRNA GENITAL QL PROBE: NEGATIVE

## 2024-04-23 ENCOUNTER — OFFICE VISIT (OUTPATIENT)
Dept: URGENT CARE | Facility: CLINIC | Age: 39
End: 2024-04-23
Payer: COMMERCIAL

## 2024-04-23 VITALS
BODY MASS INDEX: 30.43 KG/M2 | RESPIRATION RATE: 16 BRPM | OXYGEN SATURATION: 99 % | WEIGHT: 155 LBS | SYSTOLIC BLOOD PRESSURE: 127 MMHG | HEART RATE: 83 BPM | DIASTOLIC BLOOD PRESSURE: 84 MMHG | HEIGHT: 60 IN | TEMPERATURE: 99 F

## 2024-04-23 DIAGNOSIS — L01.00 IMPETIGO: Primary | ICD-10-CM

## 2024-04-23 PROCEDURE — 99213 OFFICE O/P EST LOW 20 MIN: CPT | Mod: S$GLB,,,

## 2024-04-23 RX ORDER — MUPIROCIN 20 MG/G
OINTMENT TOPICAL 3 TIMES DAILY
Qty: 30 G | Refills: 0 | Status: SHIPPED | OUTPATIENT
Start: 2024-04-23 | End: 2024-05-17 | Stop reason: ALTCHOICE

## 2024-04-23 RX ORDER — DOXYCYCLINE HYCLATE 100 MG
100 TABLET ORAL 2 TIMES DAILY
Qty: 14 TABLET | Refills: 0 | Status: SHIPPED | OUTPATIENT
Start: 2024-04-23 | End: 2024-04-30

## 2024-04-23 NOTE — PROGRESS NOTES
Subjective:      Patient ID: Amara Reis is a 38 y.o. female.    Vitals:  height is 5' (1.524 m) and weight is 70.3 kg (155 lb). Her temperature is 98.7 °F (37.1 °C). Her blood pressure is 127/84 and her pulse is 83. Her respiration is 16 and oxygen saturation is 99%.     Chief Complaint: Rash    Was exposed to niece over the weekend who was diagnosed with impetigo and had active lesions at time of exposure.     Rash  This is a new problem. The current episode started yesterday. The problem has been rapidly improving since onset. The affected locations include the face. The rash is characterized by redness, itchiness and blistering. Pertinent negatives include no fatigue or fever.       Constitution: Negative for chills, fatigue and fever.   HENT: Negative.     Neck: neck negative.   Cardiovascular: Negative.    Respiratory: Negative.     Gastrointestinal: Negative.    Musculoskeletal: Negative.    Skin:  Positive for rash (right cheek). Negative for erythema.   Neurological: Negative.    Psychiatric/Behavioral: Negative.        Objective:     Physical Exam   Constitutional: She is oriented to person, place, and time. She appears well-developed.   HENT:   Head: Normocephalic and atraumatic. Head is without abrasion, without contusion and without laceration.       Ears:   Right Ear: External ear normal.   Left Ear: External ear normal.   Nose: Nose normal.   Mouth/Throat: Oropharynx is clear and moist and mucous membranes are normal.   Blue saleem is the location of the impetigo rash. Several small vesicles present       Comments: Blue saleem is the location of the impetigo rash. Several small vesicles present   Eyes: Conjunctivae, EOM and lids are normal. Pupils are equal, round, and reactive to light.   Neck: Trachea normal and phonation normal. Neck supple.   Cardiovascular: Normal rate and normal heart sounds.   Pulmonary/Chest: Effort normal.   Musculoskeletal: Normal range of motion.         General: Normal  range of motion.   Neurological: She is alert and oriented to person, place, and time. She displays no weakness.   Skin: Skin is warm, dry, intact and rash. Capillary refill takes less than 2 seconds. No abrasion, No burn, No bruising, No erythema and No ecchymosis   Psychiatric: Her speech is normal and behavior is normal. Mood, judgment and thought content normal.   Nursing note and vitals reviewed.    Assessment:     1. Impetigo        Plan:       Impetigo  -     mupirocin (BACTROBAN) 2 % ointment; Apply topically 3 (three) times daily.  Dispense: 30 g; Refill: 0  -     doxycycline (VIBRA-TABS) 100 MG tablet; Take 1 tablet (100 mg total) by mouth 2 (two) times daily. for 7 days  Dispense: 14 tablet; Refill: 0      Discussed medication with patient who acknowledges understanding and is agreeable to POC. Follow up with primary care. Increase fluid intake. Red flags for ER discussed.

## 2024-04-23 NOTE — PATIENT INSTRUCTIONS
Mupirocin topically 3 times a day to open lesions    oral antibiotics as prescribed for the full course    If symptoms worsen or do not improve please follow up with primary care

## 2024-05-14 ENCOUNTER — OFFICE VISIT (OUTPATIENT)
Dept: URGENT CARE | Facility: CLINIC | Age: 39
End: 2024-05-14
Payer: COMMERCIAL

## 2024-05-14 VITALS
WEIGHT: 160 LBS | BODY MASS INDEX: 31.41 KG/M2 | OXYGEN SATURATION: 98 % | HEART RATE: 101 BPM | HEIGHT: 60 IN | DIASTOLIC BLOOD PRESSURE: 88 MMHG | RESPIRATION RATE: 16 BRPM | TEMPERATURE: 99 F | SYSTOLIC BLOOD PRESSURE: 124 MMHG

## 2024-05-14 DIAGNOSIS — J02.9 SORE THROAT: ICD-10-CM

## 2024-05-14 DIAGNOSIS — A09 TRAVELER'S DIARRHEA: ICD-10-CM

## 2024-05-14 DIAGNOSIS — R11.2 NAUSEA AND VOMITING, UNSPECIFIED VOMITING TYPE: ICD-10-CM

## 2024-05-14 DIAGNOSIS — B37.0 ORAL THRUSH: Primary | ICD-10-CM

## 2024-05-14 LAB
B-HCG UR QL: NEGATIVE
CTP QC/QA: YES
FLUAV AG NPH QL: NEGATIVE
FLUBV AG NPH QL: NEGATIVE
S PYO RRNA THROAT QL PROBE: NEGATIVE
SARS-COV-2 AG RESP QL IA.RAPID: NEGATIVE

## 2024-05-14 PROCEDURE — 87811 SARS-COV-2 COVID19 W/OPTIC: CPT | Mod: QW,S$GLB,,

## 2024-05-14 PROCEDURE — 81025 URINE PREGNANCY TEST: CPT | Mod: S$GLB,,,

## 2024-05-14 PROCEDURE — 87804 INFLUENZA ASSAY W/OPTIC: CPT | Mod: QW,,,

## 2024-05-14 PROCEDURE — 87880 STREP A ASSAY W/OPTIC: CPT | Mod: QW,,,

## 2024-05-14 PROCEDURE — 99214 OFFICE O/P EST MOD 30 MIN: CPT | Mod: S$GLB,,,

## 2024-05-14 RX ORDER — AZITHROMYCIN 500 MG/1
500 TABLET, FILM COATED ORAL DAILY
Qty: 5 TABLET | Refills: 0 | Status: SHIPPED | OUTPATIENT
Start: 2024-05-14 | End: 2024-05-19

## 2024-05-14 RX ORDER — MECLIZINE HCL 12.5 MG 12.5 MG/1
12.5 TABLET ORAL 3 TIMES DAILY PRN
Qty: 30 TABLET | Refills: 0 | Status: SHIPPED | OUTPATIENT
Start: 2024-05-14 | End: 2024-05-24

## 2024-05-14 RX ORDER — PROMETHAZINE HYDROCHLORIDE 12.5 MG/1
12.5 TABLET ORAL 4 TIMES DAILY
Qty: 40 TABLET | Refills: 0 | Status: SHIPPED | OUTPATIENT
Start: 2024-05-14 | End: 2024-05-24

## 2024-05-14 NOTE — PROGRESS NOTES
Subjective:      Patient ID: Amara Reis is a 38 y.o. female.    Vitals:  height is 5' (1.524 m) and weight is 72.6 kg (160 lb). Her temperature is 99.2 °F (37.3 °C). Her blood pressure is 124/88 and her pulse is 101. Her respiration is 16 and oxygen saturation is 98%.     Chief Complaint: Sore Throat    Patient just got back from a cruise yesterday mom reports symptoms started on board.  There were other passengers that had GI upset.  Patient reports nausea, vomiting, and diarrhea as well as intermittent dizziness.     Sore Throat   This is a new problem. Episode onset: x 2 days. The problem has been gradually worsening. Associated symptoms include diarrhea and vomiting.       Constitution: Positive for appetite change, chills and fatigue. Negative for fever.   HENT:  Positive for sore throat.    Neck: neck negative.   Cardiovascular: Negative.    Respiratory: Negative.     Gastrointestinal:  Positive for nausea, vomiting and diarrhea. Negative for bright red blood in stool and dark colored stools.   Musculoskeletal: Negative.    Skin: Negative.    Neurological:  Positive for dizziness.   Psychiatric/Behavioral: Negative.        Objective:     Physical Exam   Constitutional: She is oriented to person, place, and time. She appears well-developed.   HENT:   Head: Normocephalic and atraumatic.   Ears:   Right Ear: External ear normal.   Left Ear: External ear normal.   Nose: Nose normal.   Mouth/Throat: Mucous membranes are normal. Mucous membranes are moist. Posterior oropharyngeal erythema present. No oropharyngeal exudate.   Eyes: Conjunctivae and lids are normal.   Neck: Trachea normal. Neck supple.   Cardiovascular: Normal rate.   Pulmonary/Chest: Effort normal. No respiratory distress.   Abdominal: Normal appearance and bowel sounds are normal. She exhibits no distension and no mass. Soft. There is no abdominal tenderness. There is no rebound and no guarding.   Musculoskeletal: Normal range of motion.          General: Normal range of motion.   Neurological: She is alert and oriented to person, place, and time. She has normal strength. She displays no weakness.   Skin: Skin is warm, dry, intact, not diaphoretic and not pale.   Psychiatric: Her speech is normal and behavior is normal. Mood, judgment and thought content normal.   Nursing note and vitals reviewed.      Assessment:     1. Oral thrush    2. Sore throat    3. Nausea and vomiting, unspecified vomiting type    4. Traveler's diarrhea        Plan:       Oral thrush  -     diphenhydrAMINE-aluminum-magnesium hydroxide-simethicone-LIDOcaine viscous HCl 2%; Swish and spit 10 mLs every 4 (four) hours as needed (oral thrush).  Dispense: 420 each; Refill: 0    Sore throat  -     POCT Influenza A/B Rapid Antigen  -     SARS Coronavirus 2 Antigen, POCT Manual Read  -     POCT rapid strep A    Nausea and vomiting, unspecified vomiting type  -     promethazine (PHENERGAN) 12.5 MG Tab; Take 1 tablet (12.5 mg total) by mouth 4 (four) times daily. for 10 days  Dispense: 40 tablet; Refill: 0  -     meclizine (ANTIVERT) 12.5 mg tablet; Take 1 tablet (12.5 mg total) by mouth 3 (three) times daily as needed for Nausea or Dizziness.  Dispense: 30 tablet; Refill: 0  -     POCT urine pregnancy    Traveler's diarrhea  -     azithromycin (ZITHROMAX) 500 MG tablet; Take 1 tablet (500 mg total) by mouth once daily. for 5 days  Dispense: 5 tablet; Refill: 0      Covid:neg  FLU: neg  Strep: neg  UPT: negative    Discussed medication with patient who acknowledges understanding and is agreeable to POC. Follow up with primary care. Increase fluid intake. Red flags for ER discussed.

## 2024-05-16 RX ORDER — ERGOCALCIFEROL 1.25 MG/1
CAPSULE ORAL
COMMUNITY
Start: 2022-02-22

## 2024-05-17 ENCOUNTER — OFFICE VISIT (OUTPATIENT)
Dept: FAMILY MEDICINE | Facility: CLINIC | Age: 39
End: 2024-05-17
Payer: COMMERCIAL

## 2024-05-17 VITALS
HEIGHT: 60 IN | WEIGHT: 162 LBS | HEART RATE: 88 BPM | DIASTOLIC BLOOD PRESSURE: 82 MMHG | BODY MASS INDEX: 31.8 KG/M2 | RESPIRATION RATE: 20 BRPM | TEMPERATURE: 99 F | SYSTOLIC BLOOD PRESSURE: 138 MMHG

## 2024-05-17 DIAGNOSIS — R42 VERTIGO: Primary | ICD-10-CM

## 2024-05-17 PROCEDURE — 3075F SYST BP GE 130 - 139MM HG: CPT | Mod: CPTII,S$GLB,, | Performed by: NURSE PRACTITIONER

## 2024-05-17 PROCEDURE — 3008F BODY MASS INDEX DOCD: CPT | Mod: CPTII,S$GLB,, | Performed by: NURSE PRACTITIONER

## 2024-05-17 PROCEDURE — 3044F HG A1C LEVEL LT 7.0%: CPT | Mod: CPTII,S$GLB,, | Performed by: NURSE PRACTITIONER

## 2024-05-17 PROCEDURE — 3079F DIAST BP 80-89 MM HG: CPT | Mod: CPTII,S$GLB,, | Performed by: NURSE PRACTITIONER

## 2024-05-17 PROCEDURE — 99213 OFFICE O/P EST LOW 20 MIN: CPT | Mod: S$GLB,,, | Performed by: NURSE PRACTITIONER

## 2024-05-17 PROCEDURE — 99999 PR PBB SHADOW E&M-EST. PATIENT-LVL III: CPT | Mod: PBBFAC,,, | Performed by: NURSE PRACTITIONER

## 2024-05-17 RX ORDER — ALPRAZOLAM 0.5 MG/1
0.5 TABLET ORAL 3 TIMES DAILY
Qty: 9 TABLET | Refills: 0 | Status: SHIPPED | OUTPATIENT
Start: 2024-05-17 | End: 2024-06-03

## 2024-05-21 ENCOUNTER — LAB VISIT (OUTPATIENT)
Dept: LAB | Facility: HOSPITAL | Age: 39
End: 2024-05-21
Attending: NURSE PRACTITIONER
Payer: COMMERCIAL

## 2024-05-21 ENCOUNTER — PATIENT MESSAGE (OUTPATIENT)
Dept: FAMILY MEDICINE | Facility: CLINIC | Age: 39
End: 2024-05-21
Payer: COMMERCIAL

## 2024-05-21 DIAGNOSIS — N92.6 MISSED PERIOD: Primary | ICD-10-CM

## 2024-05-21 DIAGNOSIS — N92.6 MISSED PERIOD: ICD-10-CM

## 2024-05-21 LAB — HCG INTACT+B SERPL-ACNC: <1.2 MIU/ML

## 2024-05-21 PROCEDURE — 84702 CHORIONIC GONADOTROPIN TEST: CPT | Performed by: NURSE PRACTITIONER

## 2024-05-21 PROCEDURE — 36415 COLL VENOUS BLD VENIPUNCTURE: CPT | Performed by: NURSE PRACTITIONER

## 2024-05-27 ENCOUNTER — TELEPHONE (OUTPATIENT)
Dept: OTOLARYNGOLOGY | Facility: CLINIC | Age: 39
End: 2024-05-27
Payer: COMMERCIAL

## 2024-05-27 NOTE — TELEPHONE ENCOUNTER
Spoke w/pt and discussed scheduling audiogram/hearing test prior to ENT appt on 05/30 w/1530  Pt verbalized understanding and acknowledged; audiogram appt scheduled on 05/30 @ 1400. Pt has no further ENT questions/concerns at this time.

## 2024-05-30 ENCOUNTER — OFFICE VISIT (OUTPATIENT)
Dept: OTOLARYNGOLOGY | Facility: CLINIC | Age: 39
End: 2024-05-30
Payer: COMMERCIAL

## 2024-05-30 ENCOUNTER — CLINICAL SUPPORT (OUTPATIENT)
Dept: AUDIOLOGY | Facility: CLINIC | Age: 39
End: 2024-05-30
Payer: COMMERCIAL

## 2024-05-30 VITALS
WEIGHT: 162.69 LBS | DIASTOLIC BLOOD PRESSURE: 85 MMHG | SYSTOLIC BLOOD PRESSURE: 129 MMHG | BODY MASS INDEX: 31.94 KG/M2 | HEIGHT: 60 IN

## 2024-05-30 DIAGNOSIS — Z45.89 TYMPANOSTOMY TUBE CHECK: ICD-10-CM

## 2024-05-30 DIAGNOSIS — Z86.69 HISTORY OF MIGRAINE: ICD-10-CM

## 2024-05-30 DIAGNOSIS — R42 VERTIGO: ICD-10-CM

## 2024-05-30 DIAGNOSIS — R42 DIZZINESS AND GIDDINESS: ICD-10-CM

## 2024-05-30 DIAGNOSIS — Z86.73 HISTORY OF STROKE: ICD-10-CM

## 2024-05-30 DIAGNOSIS — H90.0 CONDUCTIVE HEARING LOSS, BILATERAL: Primary | ICD-10-CM

## 2024-05-30 DIAGNOSIS — H61.21 IMPACTED CERUMEN OF RIGHT EAR: Primary | ICD-10-CM

## 2024-05-30 PROCEDURE — 1160F RVW MEDS BY RX/DR IN RCRD: CPT | Mod: CPTII,S$GLB,, | Performed by: PHYSICIAN ASSISTANT

## 2024-05-30 PROCEDURE — 3079F DIAST BP 80-89 MM HG: CPT | Mod: CPTII,S$GLB,, | Performed by: PHYSICIAN ASSISTANT

## 2024-05-30 PROCEDURE — 3074F SYST BP LT 130 MM HG: CPT | Mod: CPTII,S$GLB,, | Performed by: PHYSICIAN ASSISTANT

## 2024-05-30 PROCEDURE — 92567 TYMPANOMETRY: CPT | Mod: S$GLB,,, | Performed by: AUDIOLOGIST

## 2024-05-30 PROCEDURE — 69210 REMOVE IMPACTED EAR WAX UNI: CPT | Mod: 51,S$GLB,, | Performed by: PHYSICIAN ASSISTANT

## 2024-05-30 PROCEDURE — 95992 CANALITH REPOSITIONING PROC: CPT | Mod: S$GLB,,, | Performed by: PHYSICIAN ASSISTANT

## 2024-05-30 PROCEDURE — 92557 COMPREHENSIVE HEARING TEST: CPT | Mod: S$GLB,,, | Performed by: AUDIOLOGIST

## 2024-05-30 PROCEDURE — 3008F BODY MASS INDEX DOCD: CPT | Mod: CPTII,S$GLB,, | Performed by: PHYSICIAN ASSISTANT

## 2024-05-30 PROCEDURE — 99999 PR PBB SHADOW E&M-EST. PATIENT-LVL IV: CPT | Mod: PBBFAC,,, | Performed by: PHYSICIAN ASSISTANT

## 2024-05-30 PROCEDURE — 99999 PR PBB SHADOW E&M-EST. PATIENT-LVL I: CPT | Mod: PBBFAC,,, | Performed by: AUDIOLOGIST

## 2024-05-30 PROCEDURE — 99215 OFFICE O/P EST HI 40 MIN: CPT | Mod: 25,S$GLB,, | Performed by: PHYSICIAN ASSISTANT

## 2024-05-30 PROCEDURE — 3044F HG A1C LEVEL LT 7.0%: CPT | Mod: CPTII,S$GLB,, | Performed by: PHYSICIAN ASSISTANT

## 2024-05-30 PROCEDURE — 1159F MED LIST DOCD IN RCRD: CPT | Mod: CPTII,S$GLB,, | Performed by: PHYSICIAN ASSISTANT

## 2024-05-30 RX ORDER — MECLIZINE HYDROCHLORIDE 50 MG/1
50 TABLET ORAL EVERY 8 HOURS PRN
COMMUNITY
End: 2024-06-03

## 2024-05-30 RX ORDER — DIAZEPAM 10 MG/1
TABLET ORAL
Qty: 1 TABLET | Refills: 0 | Status: SHIPPED | OUTPATIENT
Start: 2024-05-30

## 2024-05-30 NOTE — PROGRESS NOTES
[facility]    Subjective:      Patient: Amara Reis Patient PCP: Sejal Guerrero FNP-C         :  1985     Sex:  female      MRN:  [id number]          Date of Visit: 2024      Chief Complaint: Dizziness    Patient ID: Amara Reis is a 38 y.o. female who presents to office for evaluation of dizziness. Pt reports h/o stroke in . Pt had MRI Brain WO 2023 that was negative for acute intracranial abnormality. MRV Brain WO 2023 normal. MRA brain WO 2023 showed medial course of the right cavernous carotid artery, which is normal variant and was otherwise negative. Pt reports history of migraine, but states that she has not had recent issues with headache. Pt states that she had episode of dizziness without true vertigo May 13, 2024. It was not associated with head movements. Pt has been having ongoing issues with episodic dizziness without true vertigo that happens at random and not associated with positional changes. Pt has long-standing h/o hearing loss. Pt has episodic high-pitched non-pulsatile tinnitus bilaterally. No change in hearing or tinnitus in association with her dizziness. Pt was prescribed meclizine for her dizziness, which did not help with her dizziness. Pt was then prescribed xanax 0.5mg TID PRN, which did help with her dizziness. Pt states that she was having right ear drainage. She has h/o multiple PE/T tubes. Pt believes her last set of tubes were placed around 5 years ago. She reports that her left T-tube fell out, but she still has right T-tube. Pt denies fever/chills, chest pain/palpitations or ear pain.     Past Medical History  She has a past medical history of Anemia, Clostridium difficile colitis, Cystitis, Endometriosis, Glucose intolerance, Hypertrophy of nasal turbinates, Intramural leiomyoma of uterus, Ovarian cyst, Ovarian cyst, Pelvic pain in female, Sleep apnea, unspecified, Thyroid disease, and Urinary tract infection, site not  specified.    Family History  Her family history includes Fibromyalgia in her mother; Hypertension in her father and mother; Miscarriages / Stillbirths in her mother; Thyroid disease in her brother.    Past Surgical History:   Procedure Laterality Date    DILATION AND CURETTAGE OF UTERUS      Due to miscarriage.    EXCISION OF LESION OF NOSE Bilateral 02/18/2019    Procedure: EXCISION, LESION, NOSE;  Surgeon: Chadd Ward MD;  Location: [facility] OR;  Service: ENT;  Laterality: Bilateral;    LASER LAPAROSCOPY  07/01/2013    pt has had 3 surgies     MYOMECTOMY  2013    Lasered endometriosis.    MYRINGOTOMY WITH INSERTION OF VENTILATION TUBE Right 02/18/2019    Procedure: MYRINGOTOMY, WITH TYMPANOSTOMY TUBE INSERTION;  Surgeon: Chadd Ward MD;  Location: [facility] OR;  Service: ENT;  Laterality: Right;    [place] EXPLORATORY OF ABDOMEN  2012    [place] EXPLORATORY OF ABDOMEN  04/15/2014    VAGINAL DELIVERY      x2 normal     Social History     Tobacco Use    Smoking status: Never     Passive exposure: Never    Smokeless tobacco: Never   Substance and Sexual Activity    Alcohol use: No     Comment: rarely    Drug use: Never    Sexual activity: Yes     Partners: Male     Medications  She has a current medication list which includes the following prescription(s): ergocalciferol, meclizine, wescap-pn dha, tranexamic acid, alprazolam, and diazepam.    Review of patient's allergies indicates:   Allergen Reactions    Morphine Hives, Itching and Nausea And Vomiting    Flagyl [metronidazole hcl] Other (See Comments)     Oral form only--burns her   stomach. Take IV only.  Had to have surgery to fix this     All medications, allergies, and past history have been reviewed.    Objective:      Vitals:      5/14/2024    12:45 PM 5/17/2024    11:00 AM 5/30/2024     2:43 PM   Vitals - 1 value per visit   SYSTOLIC 124 138 129   DIASTOLIC 88 82 85   Pulse 101 88    Temp 99.2 °F (37.3 °C) 99 °F (37.2 °C)    Resp 16 20    SPO2 98 %     Weight (lb) 160  162 162.7   Weight (kg) 72.576 73.483 73.8   Height 5' (1.524 m) 5' (1.524 m) 5' (1.524 m)   BMI (Calculated) 31.2 31.6 31.8   Pain Score  Zero Zero       Body surface area is 1.77 meters squared.    Physical Exam  Constitutional:       General: She is not in acute distress.     Appearance: Normal appearance. She is not ill-appearing.   HENT:      Head: Normocephalic and atraumatic.      Right Ear: Ear canal and external ear normal. There is impacted cerumen.      Left Ear: Tympanic membrane, ear canal and external ear normal.      Ears:        Nose: Nose normal.      Mouth/Throat:      Lips: Pink. No lesions.      Mouth: Mucous membranes are moist. No oral lesions.      Tongue: No lesions.      Palate: No lesions.      Pharynx: Oropharynx is clear. Uvula midline. No pharyngeal swelling, oropharyngeal exudate, posterior oropharyngeal erythema or uvula swelling.      Tonsils: No tonsillar exudate or tonsillar abscesses. 2+ on the right. 2+ on the left.   Eyes:      General:         Right eye: No discharge.         Left eye: No discharge.      Extraocular Movements: Extraocular movements intact.      Conjunctiva/sclera: Conjunctivae normal.   Pulmonary:      Effort: Pulmonary effort is normal.   Neurological:      General: No focal deficit present.      Mental Status: She is alert and oriented to person, place, and time. Mental status is at baseline.   Psychiatric:         Mood and Affect: Mood normal.         Behavior: Behavior normal.         Thought Content: Thought content normal.         Judgment: Judgment normal.       Fukuda Step Test: Negative deviation  Romberg: Positive with a tendency to fall backward  Wrights-Hallpike:   Negative Right  Negative Left.    Ear Cerumen Removal     Date/Time: 5/30/2024 3:30 PM     Performed by: Luiz Parra PA-C  Authorized by: Luiz Parra PA-C       Local anesthetic:  None  Location details:  Right ear  Procedure type comment:  Right-angle hook and  alligator forceps  Cerumen  Removal Results:  Cerumen completely removed  Patient tolerance:  Patient tolerated the procedure well with no immediate complications       Labs:  WBC   Date Value Ref Range Status   01/09/2024 9.07 3.90 - 12.70 K/uL Final     Platelets   Date Value Ref Range Status   01/09/2024 272 150 - 450 K/uL Final     Creatinine   Date Value Ref Range Status   01/08/2024 0.8 0.5 - 1.4 mg/dL Final     TSH   Date Value Ref Range Status   06/06/2023 0.801 0.400 - 4.000 uIU/mL Final     Glucose   Date Value Ref Range Status   01/08/2024 124 (H) 70 - 110 mg/dL Final     Hemoglobin A1C   Date Value Ref Range Status   01/08/2024 4.8 4.0 - 5.6 % Final     Comment:     ADA Screening Guidelines:  5.7-6.4%  Consistent with prediabetes  >or=6.5%  Consistent with diabetes    High levels of fetal hemoglobin interfere with the HbA1C  assay. Heterozygous hemoglobin variants (HbS, HgC, etc)do  not significantly interfere with this assay.   However, presence of multiple variants may affect accuracy.       MRV Brain without contrast Results for orders placed during the hospital encounter of 01/28/23    MRV Brain Without Contrast    Narrative  EXAMINATION:  MRV BRAIN WITHOUT CONTRAST    CLINICAL HISTORY:  G43.009 Migraine WO aura and WO status migrainosus, not intractable;  Migraine without aura, not intractable, without status migrainosus    TECHNIQUE:  Noncontrast 2-D time-of-flight MRV of the head with coronal and sagittal source imaging and 3-dimensional MIP reconstructions.    COMPARISON:  MRI same day.    FINDINGS:  The superior sagittal sinus is patent.  The paired internal cerebral veins are patent.  The straight sinus is patent.    The bilateral transverse and sigmoid dural venous sinuses are patent to the jugular foramen.    Impression  Normal MRV of the brain.    MRI Brain without contrast Results for orders placed during the hospital encounter of 01/28/23    MRI Brain Without  Contrast    Narrative  EXAMINATION:  MRI BRAIN WITHOUT CONTRAST    CLINICAL HISTORY:  G43.009 Migraine WO aura and WO status migrainosus, not intractable; Migraine without aura, not intractable, without status migrainosus    TECHNIQUE:  Multiplanar multisequence MR imaging of the brain was performed without contrast.    COMPARISON:  CT 09/28/2022.    FINDINGS:  There is no acute hemorrhage or infarction.  Diffusion-weighted images demonstrate no restricted diffusion of acute ischemia.    There is a normal pattern of gray-white matter differentiation.    No extra-axial fluid collections.  The ventricles are normal size, shape and configuration.  Basal cisterns are patent.  The major intracranial vessels demonstrate normal flow voids.    The imaged paranasal sinuses and ethmoid air cells are well aerated.  The mastoid air cells are normally pneumatized.    Impression  No acute intracranial abnormality.    MRA Brain without contrast Results for orders placed during the hospital encounter of 01/31/23    MRA Brain without contrast    Narrative  EXAMINATION:  MRA BRAIN WITHOUT CONTRAST    CLINICAL HISTORY:  g43.009; Migraine without aura, not intractable, without status migrainosus.    TECHNIQUE:  Non-contrast 3-D time-of-flight intracranial MR angiography was performed through the [place] with MIP reformatting.    COMPARISON:  MRI/MRV 01/28/2023.  CT of the head 09/28/2022.    FINDINGS  Vasculature: The intracranial arteries show normal anatomy without evidence of major branch occlusion or focal luminal narrowing. There is no suspicion of vascular malformation or saccular aneurysm.    Variant anatomy: Somewhat medial course of the right cavernous carotid artery as a variant of normal.    Impression  1. Essentially negative MRA of the head.        Audiogram Summary:      All lab results and imaging results have been reviewed.    Assessment:        ICD-10-CM ICD-9-CM   1. Impacted cerumen of right ear  H61.21  380.4   2. Vertigo  R42 780.4   3. History of stroke  Z86.73 V12.54   4. Dizziness and giddiness  R42 780.4   5. History of migraine  Z86.69 V12.49   6. Tympanostomy tube check  Z45.89 V67.09            Plan:     Right ear cleaning performed today in office. Pt defers on removal of right T-tube today in office. T-tube is patent and without purulent drainage. She will continue water precautions for her ear. If water gets in ear, use hairdryer low setting no heat for 30 seconds dry ear. No IFRAH. Pt has mild low frequency hearing bilaterally. There is a mild conductive component in left-sided hearing loss at 500Hz, but is otherwise sensorineural for both ears. Type B tymp with large canal volume for right ear in setting of T-tube. Type A tymp for left ear. We will continue to monitor hearing loss with annual audiograms. Pt has dizziness that happens at random and not associated with positional movements. Vestibular physical examination today unremarkable for BPPV. No deviation upon fukuda. She did begin to fall backward upon romberg eyes closed in office. She states that meclizine did not help with her dizziness, but xanax 0.5mg TID PRN helped with dizziness episodes. I advised pt that meclizine is a vestibular suppressant that usually helps with peripheral (inner ear) dizziness. Xanax is a CNS depressant that can help with both peripheral and central dizziness (brain-related). Due to the fact that meclizine did not help, but Xanax did help, this is a strong indicator of likely central etiology to dizziness. I will have her proceed with MRI Brain w/wo to further assess due to new onset dizziness since her last MRI/MRA/MRV Brain 01/2023 and will also have her follow up with Neurology to further assess. I advised pt that vestibular physical therapy helps with both peripheral and central dizziness. I have placed consult to vestibular physical therapy. Our office will reach out with results and recommendations of MRI Brain  w/wo. Valium 10mg to take 1 hour prior to MRI prescribed to pt. I will follow up with pt in 2 months to ensure that dizziness is improving with VRT. Pt may follow up sooner if having worsening of symptoms or ENT issues/concerns.

## 2024-05-30 NOTE — PROGRESS NOTES
Amara Reis was seen 05/30/2024 for an audiological evaluation. Pt was accompanied by her child during today's visit. Pertinent complaints today include dizziness and drainage from the right ear. Pt denies history of loud noise exposure and denies early onset of genetic family history of hearing loss. Otoscopy revealed some cerumen in the right ear. The tympanic membrane was visualized AU prior to proceeding with the hearing testing.     Results reveal a moderate  low frequency  hearing loss for the right ear and  mild conductive hearing loss for the left ear.    Speech Reception Thresholds were  15 dBHL for the right ear and 20 dBHL for the left ear.    Word recognition scores were excellent for the right ear and excellent for the left ear.   Tympanograms were Type B large volume for the right ear and Type A for the left ear.    Recommendations: 1) Follow up with ENT     2) Recheck after treatment    Audiogram results were reviewed in detail with patient and all questions were answered. Results will be reviewed by the referring provider at the completion of this note. All complaints were addressed during this visit to the patient's satisfaction.

## 2024-05-30 NOTE — PROCEDURES
Ear Cerumen Removal    Date/Time: 5/30/2024 3:30 PM    Performed by: Luiz Parra PA-C  Authorized by: Luiz Parra PA-C      Local anesthetic:  None  Location details:  Right ear  Procedure type comment:  Right-angle hook and alligator forceps  Cerumen  Removal Results:  Cerumen completely removed  Patient tolerance:  Patient tolerated the procedure well with no immediate complications    
no

## 2024-06-03 ENCOUNTER — CLINICAL SUPPORT (OUTPATIENT)
Dept: REHABILITATION | Facility: HOSPITAL | Age: 39
End: 2024-06-03
Payer: COMMERCIAL

## 2024-06-03 ENCOUNTER — OFFICE VISIT (OUTPATIENT)
Dept: FAMILY MEDICINE | Facility: CLINIC | Age: 39
End: 2024-06-03
Payer: COMMERCIAL

## 2024-06-03 VITALS
HEIGHT: 60 IN | OXYGEN SATURATION: 99 % | BODY MASS INDEX: 31.48 KG/M2 | SYSTOLIC BLOOD PRESSURE: 128 MMHG | HEART RATE: 89 BPM | DIASTOLIC BLOOD PRESSURE: 86 MMHG | WEIGHT: 160.31 LBS

## 2024-06-03 DIAGNOSIS — E66.09 CLASS 1 OBESITY DUE TO EXCESS CALORIES WITHOUT SERIOUS COMORBIDITY WITH BODY MASS INDEX (BMI) OF 30.0 TO 30.9 IN ADULT: Primary | ICD-10-CM

## 2024-06-03 DIAGNOSIS — Z71.3 WEIGHT LOSS COUNSELING, ENCOUNTER FOR: ICD-10-CM

## 2024-06-03 DIAGNOSIS — R42 DIZZINESS AND GIDDINESS: ICD-10-CM

## 2024-06-03 DIAGNOSIS — R42 DYSEQUILIBRIUM: ICD-10-CM

## 2024-06-03 DIAGNOSIS — R42 VERTIGO: Primary | ICD-10-CM

## 2024-06-03 DIAGNOSIS — Z86.73 HISTORY OF STROKE: ICD-10-CM

## 2024-06-03 DIAGNOSIS — Z86.69 HISTORY OF MIGRAINE: ICD-10-CM

## 2024-06-03 PROCEDURE — 97112 NEUROMUSCULAR REEDUCATION: CPT | Mod: PO

## 2024-06-03 PROCEDURE — 3079F DIAST BP 80-89 MM HG: CPT | Mod: CPTII,S$GLB,, | Performed by: FAMILY MEDICINE

## 2024-06-03 PROCEDURE — 3074F SYST BP LT 130 MM HG: CPT | Mod: CPTII,S$GLB,, | Performed by: FAMILY MEDICINE

## 2024-06-03 PROCEDURE — 1159F MED LIST DOCD IN RCRD: CPT | Mod: CPTII,S$GLB,, | Performed by: FAMILY MEDICINE

## 2024-06-03 PROCEDURE — 3044F HG A1C LEVEL LT 7.0%: CPT | Mod: CPTII,S$GLB,, | Performed by: FAMILY MEDICINE

## 2024-06-03 PROCEDURE — 3008F BODY MASS INDEX DOCD: CPT | Mod: CPTII,S$GLB,, | Performed by: FAMILY MEDICINE

## 2024-06-03 PROCEDURE — 97161 PT EVAL LOW COMPLEX 20 MIN: CPT | Mod: PO

## 2024-06-03 PROCEDURE — 99999 PR PBB SHADOW E&M-EST. PATIENT-LVL III: CPT | Mod: PBBFAC,,, | Performed by: FAMILY MEDICINE

## 2024-06-03 PROCEDURE — 99213 OFFICE O/P EST LOW 20 MIN: CPT | Mod: S$GLB,,, | Performed by: FAMILY MEDICINE

## 2024-06-03 RX ORDER — SEMAGLUTIDE 1 MG/.5ML
1 INJECTION, SOLUTION SUBCUTANEOUS
Qty: 4 EACH | Refills: 1 | Status: SHIPPED | OUTPATIENT
Start: 2024-08-03 | End: 2024-06-03 | Stop reason: SDUPTHER

## 2024-06-03 RX ORDER — SEMAGLUTIDE 0.25 MG/.5ML
0.25 INJECTION, SOLUTION SUBCUTANEOUS
Qty: 2 ML | Refills: 0 | Status: SHIPPED | OUTPATIENT
Start: 2024-06-03

## 2024-06-03 RX ORDER — SEMAGLUTIDE 0.5 MG/.5ML
0.5 INJECTION, SOLUTION SUBCUTANEOUS
Qty: 4 EACH | Refills: 0 | Status: SHIPPED | OUTPATIENT
Start: 2024-07-03 | End: 2024-06-03 | Stop reason: SDUPTHER

## 2024-06-03 RX ORDER — SEMAGLUTIDE 0.25 MG/.5ML
0.25 INJECTION, SOLUTION SUBCUTANEOUS
Qty: 4 EACH | Refills: 0 | Status: SHIPPED | OUTPATIENT
Start: 2024-06-03 | End: 2024-06-03 | Stop reason: SDUPTHER

## 2024-06-03 RX ORDER — SEMAGLUTIDE 0.5 MG/.5ML
0.5 INJECTION, SOLUTION SUBCUTANEOUS
Qty: 4 EACH | Refills: 0 | Status: SHIPPED | OUTPATIENT
Start: 2024-07-03

## 2024-06-03 RX ORDER — SEMAGLUTIDE 1 MG/.5ML
1 INJECTION, SOLUTION SUBCUTANEOUS
Qty: 4 EACH | Refills: 1 | Status: SHIPPED | OUTPATIENT
Start: 2024-08-03

## 2024-06-03 NOTE — PLAN OF CARE
[facility]  Physical Therapy Neurological Rehabilitation Initial Evaluation    Name: Amara Reis  Clinic Number: [id number]    Therapy Diagnosis:   Encounter Diagnoses   Name Primary?    Dizziness and giddiness     Vertigo Yes    Dysequilibrium     History of migraine     History of stroke      Physician: Luiz Parra,*    Physician Orders: physical therapy evaluation and treat; vestibular rehab therapy   Medical Diagnosis from Referral: dizziness and giddiness  Evaluation Date: 6/3/2024  Authorization Period Expiration: 12/31/2024  Plan of Care Expiration: 07/20/2024  Visit # / Visits authorized: 1/ 1    Time In: 1520  Time Out: 1600  Total Billable Time: 40 minutes    Precautions: Fall; history of stroke and migraine (per patient)      Subjective     Date of onset: 05/30/2024  History of Current Symptoms, Amara reports: that her dizziness started while on a cruise about a year and a half ago; patient was given Meclizine at onset of her symptoms  but without relief - prescribed Xanax and did report some relief; patient describes her dizziness as an inconsistent room-spinning sensation not dependent on head movement; Amara also notes occasional veering during gait; patient endorses history of stroke without residual deficits and history of intermittent migraine headaches (no headache at this time).     History of migraines: yes     Medical History:   Past Medical History:   Diagnosis Date    Anemia     Clostridium difficile colitis     hx--reoccurs with antibiotic use    Cystitis 05/20/2014    Endometriosis     Glucose intolerance     Hypertrophy of nasal turbinates 02/18/2019    Intramural leiomyoma of uterus 12/01/2016    Ovarian cyst     Ovarian cyst 05/20/2014    Pelvic pain in female     Sleep apnea, unspecified     Thyroid disease     Thyroid nodule    Urinary tract infection, site not specified      Surgical History:   Amara Reis  has a past surgical history  that includes pr exploratory of abdomen (2012); Vaginal delivery; pr exploratory of abdomen (04/15/2014); Laser laparoscopy (07/01/2013); Myomectomy (2013); Excision of lesion of nose (Bilateral, 02/18/2019); Myringotomy with insertion of ventilation tube (Right, 02/18/2019); and Dilation and curettage of uterus.    Medications:   Amara has a current medication list which includes the following prescription(s): diazepam, ergocalciferol, wescap-pn dha, wegovy, [START ON 7/3/2024] wegovy, [START ON 8/3/2024] wegovy, and tranexamic acid.    Allergies:   Review of patient's allergies indicates:   Allergen Reactions    Morphine Hives, Itching and Nausea And Vomiting    Flagyl [metronidazole hcl] Other (See Comments)     Oral form only--burns her   stomach. Take IV only.  Had to have surgery to fix this      Imaging (MRI of brain on 01/28/2023): No acute intracranial abnormality.     Prior Therapy: none  Social History: lives with her family  Falls: none   Occupation: stay at home mom  Prior Level of Function: independent   Current Level of Function: minimal difficulty with activities of daily living     Pain:  Current 0/10, worst 10/10, best 0/10   Location: headache   Description: Aching and Dull  Aggravating Factors: stress  Easing Factors: rest; migraine medications     Pts goals: decrease symptoms      Objective     - Follows commands: 100% of time   - Speech deficits: n/a    Functional Mobility & ADLs:   Sit to stand: supervision     Mental status: alert, oriented to person, place, and time, normal mood, behavior, speech, dress, motor activity, and thought processes  Appearance: Casually dressed  Behavior:  calm and cooperative  Attention Span and Concentration:  Normal    Posture Alignment in sitting:   Head: forward head     Sensation: Light Touch: Intact          Proprioception: Intact, Kinesthesia Intact         Visual/Auditory:   Tracking/Smooth Pursuits:Intact  Saccades: Intact  Modified Dynamic Visual  "Acuity Test:Impaired: blurred vision with head movement   Gaze Evoked: Intact  Convergence: 7 centimeters   VOR: Intact    Coordination:   - fine motor: within functional limits   - UE coordination: within functional limits    - LE coordination:  within functional limits    ROM:   CERVICAL SPINE  Flexion 40 degrees (80-90 deg)  Extension 50 degrees (70-80 deg)  L side bend 35 degrees, R side bend 35 degrees (20-45 degrees)  L rotation 50 degrees, R rotation 50 degrees (70-90 degrees)  Are concurrent symptoms present with any of these movements = no     Modified VAS (Vertebral Artery Screen), in sitting (rotation, then extension):  R: NT  L: NT    RANGE OF MOTION--LOWER EXTREMITIES  (R) LE Hip: normal   Knee: normal   Ankle: normal    (L) LE: Hip: normal   Knee: normal   Ankle: normal    Strength: manual muscle test grades below     Lower Extremity Strength  Right LE  Left LE    Hip flexion:  4/5 Hip flexion: 4/5   Knee extension: 4+/5 Knee extension: 4+/5   Ankle dorsiflexion:  4+/5 Ankle dorsiflexion: 4+/5       PARVIN SENSORY TESTING:  (P= Pass, F= Fail; note any sway; hold each position for 30")  Condition 6: (soft surface/feet together/eyes closed) = minimal to moderate sway  Condition 7: (Fakuda step test), measure distance varied from center starting position, > 30 deg deviation to either side indicates hypofunction of biased side = no rotation after 50 steps    Gait Assessment:(if indicated)  - AD used: none  - Assistance: supervision   - Distance: 2 x 50 feet     GAIT DEVIATIONS:  Amara displays the following deviations with ambulation: mild path deviation    Impairments contributing to deviations: dysequilibrium     Endurance Deficit: minimal complaints of fatigue       POSITIONAL CANAL TESTING  Looking for nystagmus (slow phase followed by quick phase to the affected side for BPPV)    Burr Hallpike (posterior / CL anterior)   Right : n/a   Left: n/a  Horizontal Canals   Right: n/a   Left: n/a  Treatment " Performed: n/a        Intake Outcome Measure for FOTO Vestibular Survey    Therapist reviewed FOTO scores for Amara Reis on 6/3/2024.   FOTO documents entered into [employer] - see Media section.    Intake Score: 29.9% disability         TREATMENT     Treatment Time In: 1550  Treatment Time Out: 1600  Total Treatment time separate from Evaluation: 10 minutes    Amara participated in neuromuscular re-education activities to assess: Balance and Vestibular function for 10 minutes. The following activities were included:    X 5 each seated smooth pursuits = side to side, up and down  X 5 each seated saccades = side to side, up and down  X 5 each seated VOR1 = side to side, up and down  X 20 seconds stand on AirEx = eyes closed  X 50 stepping in place = eyes closed      Home Exercises and Patient Education Provided    Education provided:   - proper therapeutic exercise technique  - treatment plan    Written Home Exercises Provided: to be provided at future appointment.      Assessment     Amara is a 38 y.o. female referred to outpatient Physical Therapy with a medical diagnosis of dizziness and giddiness. Pt presents to PT with the following impairments leading to her functional decline: vertigo, history of migraine, history of stroke and dysequilibrium. Patient exhibits symptoms that share both central and peripheral characteristics - may be associated with possible vestibular migraine.    Pt prognosis is Fair.   Pt will benefit from skilled outpatient Physical Therapy to address the deficits stated above and in the chart below, provide pt/family education, and to maximize pt's level of independence.     Plan of care discussed with patient: Yes  Pt's spiritual, cultural and educational needs considered and patient is agreeable to the plan of care and goals as stated below:     Anticipated Barriers for therapy: severity of symptoms    Medical Necessity is demonstrated by the following  History  Co-morbidities and  personal factors that may impact the plan of care [] LOW: no personal factors / co-morbidities  [] MODERATE: 1-2 personal factors / co-morbidities  [x] HIGH: 3+ personal factors / co-morbidities    Moderate / High Support Documentation: history of anemia,  stroke and migraine     Examination  Body Structures and Functions, activity limitations and participation restrictions that may impact the plan of care [] LOW: addressing 1-2 elements  [] MODERATE: 3+ elements  [x] HIGH: 4+ elements (please support below)    Moderate / High Support Documentation: gait; central; vestibular, balance     Clinical Presentation [x] LOW: stable  [] MODERATE: Evolving  [] HIGH: Unstable     Decision Making/ Complexity Score: low       Goals:    Short Term Goals (3 Weeks):   Maintain patient's complaints of dizziness to less than 5/10 during performance of activities of daily living for independence of self care activities.  Patient to tolerate x 45 seconds full Romberg stance, eyes closed to improve upright tolerance.  Patient to begin adaptation home exercise program.     Long Term Goals (6 Weeks):   Patient to demonstrate competence with home exercise program to maintain therapeutic gains.  2.   Patient to ambulate 20 feet in less than 7 seconds to improve rad/symmetry.  3.   Patient to perform floor ladder high stepping without loss of balance.      Plan     Plan of care Certification: 6/3/2024 to 07/20/2024.    Outpatient Physical Therapy evaluation, plus 2 times weekly for 6 weeks to include the following interventions (starting week of 06/10/24): Gait Training, Manual Therapy, Moist Heat/ Ice, Neuromuscular Re-ed, Patient Education, Self Care, Therapeutic Activities, Therapeutic Exercise, and home exercise program .     Beau Will, PT

## 2024-06-03 NOTE — PROGRESS NOTES
"  SUBJECTIVE:    Patient ID: Amara Reis is a 38 y.o. female.    Chief Complaint: Weight Loss  39 yo female new to this provider.She has a history of Obesity and comes in to discuss medical weight management. Past medical history is notable for PCOS. Pt was seen in the Past by Dr Payton and Placed on Phenteramine byut she has continued to gain weight, pt states that she has contacted her insurance company and they are willing to pay for GLP-1 medication for weight loss.  I expressed That I will prescribe the medication but it is up to her inzurance company to pay for this medication if they are not willing to pay for the medication she can pay out of pocket for this medication.         High school weight:  85  Current weight: 160  Ideal Body weight 100      History of uncontrolled hypertension? no  History of cardiovascular disease? no  History of glaucoma? no  History of eating disorder? As a child, used to starve self "to stay skinny" under her mother's guidance. Was 85 lb at first pregnancy  History of substance use? none  History of cholelithiasis? no  History of pancreatitis? no  Personal or family history of Medullary Thyroid Cancer or MEN2? no    HPI      Past Medical History:   Diagnosis Date    Anemia     Clostridium difficile colitis     hx--reoccurs with antibiotic use    Cystitis 05/20/2014    Endometriosis     Glucose intolerance     Hypertrophy of nasal turbinates 02/18/2019    Intramural leiomyoma of uterus 12/01/2016    Ovarian cyst     Ovarian cyst 05/20/2014    Pelvic pain in female     Sleep apnea, unspecified     Thyroid disease     Thyroid nodule    Urinary tract infection, site not specified      Social History     Socioeconomic History    Marital status:    Tobacco Use    Smoking status: Never     Passive exposure: Never    Smokeless tobacco: Never   Substance and Sexual Activity    Alcohol use: No     Comment: rarely    Drug use: Never    Sexual activity: Yes     Partners: Male "     Social Determinants of Health     Financial Resource Strain: Medium Risk (5/16/2024)    Overall Financial Resource Strain (CARDIA)     Difficulty of Paying Living Expenses: Somewhat hard   Food Insecurity: No Food Insecurity (5/16/2024)    Hunger Vital Sign     Worried About Running Out of Food in the Last Year: Never true     Ran Out of Food in the Last Year: Never true   Transportation Needs: No Transportation Needs (2/13/2023)    [facility]    PRAPARE - Transportation     Lack of Transportation (Medical): No     Lack of Transportation (Non-Medical): No   Physical Activity: Inactive (5/16/2024)    Exercise Vital Sign     Days of Exercise per Week: 0 days     Minutes of Exercise per Session: 0 min   Stress: Stress Concern Present (5/16/2024)    Citizen of [place] [employer] - Occupational Stress Questionnaire     Feeling of Stress : Rather much   Housing Stability: Unknown (5/16/2024)    Housing Stability Vital Sign     Unable to Pay for Housing in the Last Year: No     Past Surgical History:   Procedure Laterality Date    DILATION AND CURETTAGE OF UTERUS      Due to miscarriage.    EXCISION OF LESION OF NOSE Bilateral 02/18/2019    Procedure: EXCISION, LESION, NOSE;  Surgeon: Chadd Ward MD;  Location: [facility] OR;  Service: ENT;  Laterality: Bilateral;    LASER LAPAROSCOPY  07/01/2013    pt has had 3 surgies     MYOMECTOMY  2013    Lasered endometriosis.    MYRINGOTOMY WITH INSERTION OF VENTILATION TUBE Right 02/18/2019    Procedure: MYRINGOTOMY, WITH TYMPANOSTOMY TUBE INSERTION;  Surgeon: Chadd Ward MD;  Location: [facility] OR;  Service: ENT;  Laterality: Right;    ND EXPLORATORY OF ABDOMEN  2012    ND EXPLORATORY OF ABDOMEN  04/15/2014    VAGINAL DELIVERY      x2 normal     Family History   Problem Relation Name Age of  Onset    Hypertension Mother Candy mccauley     Miscarriages / Stillbirths Mother Candy mccauley     Fibromyalgia Mother Candy mccauley     Hypertension Father Philippe mccauley     Thyroid disease Brother      Breast cancer Neg Hx      Colon cancer Neg Hx       Current Outpatient Medications   Medication Sig Dispense Refill    diazePAM (VALIUM) 10 MG Tab Take 1 tab (10mg) by mouth 1 hour prior MRI. 1 tablet 0    ergocalciferol (VITAMIN D2) 50,000 unit Cap       multivit 47-iron-folate 1-dha (WESCAP-PN DHA) 27 mg iron-1 mg -300 mg Cap Take 1 tablet by mouth once daily.      tranexamic acid (LYSTEDA) 650 mg tablet       semaglutide, weight loss, (WEGOVY) 0.25 mg/0.5 mL PnIj Inject 0.25 mg into the skin every 7 days. 2 mL 0    [START ON 7/3/2024] semaglutide, weight loss, (WEGOVY) 0.5 mg/0.5 mL PnIj Inject 0.5 mg into the skin every 7 days. 4 each 0    [START ON 8/3/2024] semaglutide, weight loss, (WEGOVY) 1 mg/0.5 mL PnIj Inject 1 mg into the skin every 7 days. 4 each 1     No current facility-administered medications for this visit.     Review of patient's allergies indicates:   Allergen Reactions    Morphine Hives, Itching and Nausea And Vomiting    Flagyl [metronidazole hcl] Other (See Comments)     Oral form only--burns her   stomach. Take IV only.  Had to have surgery to fix this       Review of Systems   Constitutional:  Negative for activity change, diaphoresis, fatigue and unexpected weight change.   HENT:  Negative for congestion, hearing loss, postnasal drip, rhinorrhea, sinus pain and trouble swallowing.    Eyes:  Negative for discharge and visual disturbance.   Respiratory:  Negative for cough, chest tightness, shortness of breath and wheezing.    Cardiovascular:  Negative for chest pain and palpitations.   Gastrointestinal:  Negative for blood in stool, constipation, diarrhea and vomiting.   Endocrine: Negative for polydipsia and polyuria.   Genitourinary:  Negative for difficulty urinating, dysuria, hematuria  and menstrual problem.   Musculoskeletal:  Negative for arthralgias, joint swelling and neck pain.   Neurological:  Negative for weakness and headaches.   Psychiatric/Behavioral:  Negative for confusion and dysphoric mood.           Blood pressure 128/86, pulse 89, height 5' (1.524 m), weight 72.7 kg (160 lb 4.8 oz), last menstrual period 04/20/2024, SpO2 99%, not currently breastfeeding. Body mass index is 31.31 kg/m².   Objective:      Physical Exam  Vitals reviewed.   Constitutional:       General: She is not in acute distress.     Appearance: She is well-developed.   HENT:      Head: Normocephalic and atraumatic.      Right Ear: External ear normal.      Left Ear: External ear normal.   Eyes:      General:         Right eye: No discharge.         Left eye: No discharge.      Conjunctiva/sclera: Conjunctivae normal.      Pupils: Pupils are equal, round, and reactive to light.   Cardiovascular:      Rate and Rhythm: Normal rate and regular rhythm.      Heart sounds: Normal heart sounds. No murmur heard.  Pulmonary:      Effort: Pulmonary effort is normal. No respiratory distress.      Breath sounds: Normal breath sounds.   Skin:     General: Skin is warm and dry.             Assessment:       1. Class 1 obesity due to excess calories without serious comorbidity with body mass index (BMI) of 30.0 to 30.9 in adult    2. Weight loss counseling, encounter for         Plan:           Class 1 obesity due to excess calories without serious comorbidity with body mass index (BMI) of 30.0 to 30.9 in adult  -     semaglutide, weight loss, (WEGOVY) 0.25 mg/0.5 mL PnIj; Inject 0.25 mg into the skin every 7 days.  Dispense: 2 mL; Refill: 0  -     semaglutide, weight loss, (WEGOVY) 0.5 mg/0.5 mL PnIj; Inject 0.5 mg into the skin every 7 days.  Dispense: 4 each; Refill: 0  -     semaglutide, weight loss, (WEGOVY) 1 mg/0.5 mL PnIj; Inject 1 mg into the skin every 7 days.  Dispense: 4 each; Refill: 1  Will start on medications and  have her follow up in one month      Weight loss counseling, encounter for  -     semaglutide, weight loss, (WEGOVY) 0.25 mg/0.5 mL PnIj; Inject 0.25 mg into the skin every 7 days.  Dispense: 2 mL; Refill: 0  -     semaglutide, weight loss, (WEGOVY) 0.5 mg/0.5 mL PnIj; Inject 0.5 mg into the skin every 7 days.  Dispense: 4 each; Refill: 0  -     semaglutide, weight loss, (WEGOVY) 1 mg/0.5 mL PnIj; Inject 1 mg into the skin every 7 days.  Dispense: 4 each; Refill: 1

## 2024-06-03 NOTE — TELEPHONE ENCOUNTER
In visit orders it says transmission to pharmacy failed but in this encounter it is saying fill in progress.

## 2024-06-13 ENCOUNTER — HOSPITAL ENCOUNTER (OUTPATIENT)
Dept: RADIOLOGY | Facility: HOSPITAL | Age: 39
Discharge: HOME | End: 2024-06-13
Attending: PHYSICIAN ASSISTANT
Payer: COMMERCIAL

## 2024-06-13 DIAGNOSIS — R42 DIZZINESS AND GIDDINESS: ICD-10-CM

## 2024-06-13 PROCEDURE — 25500020 PHARM REV CODE 255

## 2024-06-13 PROCEDURE — A9585 GADOBUTROL INJECTION: HCPCS

## 2024-06-13 PROCEDURE — 70553 MRI BRAIN STEM W/O & W/DYE: CPT | Mod: 26,,, | Performed by: RADIOLOGY

## 2024-06-13 PROCEDURE — 70553 MRI BRAIN STEM W/O & W/DYE: CPT | Mod: TC

## 2024-06-13 RX ORDER — GADOBUTROL 604.72 MG/ML
INJECTION INTRAVENOUS
Status: COMPLETED
Start: 2024-06-13 | End: 2024-06-13

## 2024-06-13 RX ADMIN — GADOBUTROL 7 ML: 604.72 INJECTION INTRAVENOUS at 11:06

## 2024-06-14 ENCOUNTER — CLINICAL SUPPORT (OUTPATIENT)
Dept: REHABILITATION | Facility: HOSPITAL | Age: 39
End: 2024-06-14
Payer: COMMERCIAL

## 2024-06-14 DIAGNOSIS — Z86.69 HISTORY OF MIGRAINE: ICD-10-CM

## 2024-06-14 DIAGNOSIS — Z86.73 HISTORY OF STROKE: ICD-10-CM

## 2024-06-14 DIAGNOSIS — R42 DYSEQUILIBRIUM: ICD-10-CM

## 2024-06-14 DIAGNOSIS — R42 DIZZINESS AND GIDDINESS: Primary | ICD-10-CM

## 2024-06-14 DIAGNOSIS — R42 VERTIGO: ICD-10-CM

## 2024-06-14 PROCEDURE — 97112 NEUROMUSCULAR REEDUCATION: CPT | Mod: PO

## 2024-06-14 NOTE — PROGRESS NOTES
[facility]   Physical Therapy Treatment Note      Name: Amara Reis  Clinic Number: [id number]    Therapy Diagnosis:   Encounter Diagnoses   Name Primary?    Dizziness and giddiness Yes    Vertigo     Dysequilibrium     History of migraine     History of stroke      Physician: Luiz Parra,*    Visit Date: 6/14/2024    Physician Orders: physical therapy evaluation and treat; vestibular rehab therapy   Medical Diagnosis from Referral: dizziness and giddiness  Evaluation Date: 6/3/2024  Authorization Period Expiration: 12/31/2024  Plan of Care Expiration: 07/20/2024  Visit # / Visits authorized: 1/ 30     Time In: 1303  Time Out: 1343  Total Billable Time: 40 minutes     Precautions: Fall; history of stroke and migraine (per patient)      Subjective     Patient reports: no dizziness nor headache.    She  does not have a home exercise program.  Response to previous treatment: no changes  Functional change: none    Pain: no complaints of pain    Dizziness: 0/10      Objective      Objective Measures updated at progress report unless specified.     Treatment     Amara received the treatments listed below:      neuromuscular re-education activities to improve: Balance and Vestibular/Central function for 40 minutes. The following activities were included:    X 10 sit to stand while holding target  X 10 lean over touch desk while holding target  X 10 standing bilateral head tilts while holding target  X 45 seconds each full Romberg training = eyes open/eyes closed*  X 30 seconds each seated smooth pursuits (single target) = side to side, up and down  X 28 each standing saccades (repeating random numbers) = side to side  X 28 each standing VOR1 (repeating random numbers) = side to side*, up and down  2 x 20 feet VOR1 gait = side to side*, up and down   2 x 20 feet each balance gait in barboza = 360 degree turn midway, eyes closed*   Provided patient with adaptation home exercise program  - instructed to perform twice a day       *minimal difficulty      Patient Education and Home Exercises       Education provided:   - proper therapeutic exercise technique  - continue home exercise program twice a day     Written Home Exercises Provided: yes. Exercises were reviewed and Amara was able to demonstrate them prior to the end of the session.  Amara demonstrated fair understanding of the education provided. See Electronic Medical Record under Patient Instructions for exercises provided during therapy sessions.      Assessment     Patient was able to perform habituation exercises without symptom elevation; minimal sway noted during full Romberg training, eyes closed; cognitive task during standing saccades and VOR1 exercises set at repeating random numbers; minimal symptom elevation reported during VOR1, side to side exercise - none reported during remaining adaptation exercises; minimal dysequilibrium noted during VOR1 gait, side to side; minimal path deviation demonstrated during gait with eyes closed.     Amara Is progressing fairly well towards her goals.   Patient prognosis is Fair.     Patient will continue to benefit from skilled outpatient physical therapy to address the deficits listed in the problem list box on initial evaluation, provide pt/family education and to maximize pt's level of independence in the home and community environment.     Patient's spiritual, cultural and educational needs considered and pt agreeable to plan of care and goals.     Anticipated barriers to physical therapy: severity of symptoms    Goals:     Short Term Goals (3 Weeks):   Maintain patient's complaints of dizziness to less than 5/10 during performance of activities of daily living for independence of self care activities. (MET)  Patient to tolerate x 45 seconds full Romberg stance, eyes closed to improve upright tolerance. (PART MET)  Patient to begin adaptation home exercise program. (NOT MET)     Long Term  Goals (6 Weeks):   Patient to demonstrate competence with home exercise program to maintain therapeutic gains. (NOT MET)  2.   Patient to ambulate 20 feet in less than 7 seconds to improve rad/symmetry. (NOT MET)  3.   Patient to perform floor ladder high stepping without loss of balance. (NOT MET)      Plan     Continue to progress balance and vestibular/central training to patient's tolerance.      Beau Will, PT

## 2024-06-17 ENCOUNTER — TELEPHONE (OUTPATIENT)
Dept: OTOLARYNGOLOGY | Facility: CLINIC | Age: 39
End: 2024-06-17
Payer: COMMERCIAL

## 2024-06-17 NOTE — TELEPHONE ENCOUNTER
Spoke with pt on phone. 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 I would like pt to keep her appt with neurology to follow and continue VRT and we will proceed with planned follow up to see how she is doing after undergoing VRT.

## 2024-06-25 ENCOUNTER — CLINICAL SUPPORT (OUTPATIENT)
Dept: REHABILITATION | Facility: HOSPITAL | Age: 39
End: 2024-06-25
Payer: COMMERCIAL

## 2024-06-25 ENCOUNTER — PATIENT MESSAGE (OUTPATIENT)
Dept: FAMILY MEDICINE | Facility: CLINIC | Age: 39
End: 2024-06-25
Payer: COMMERCIAL

## 2024-06-25 DIAGNOSIS — E66.09 CLASS 1 OBESITY DUE TO EXCESS CALORIES WITHOUT SERIOUS COMORBIDITY WITH BODY MASS INDEX (BMI) OF 30.0 TO 30.9 IN ADULT: ICD-10-CM

## 2024-06-25 DIAGNOSIS — R42 DYSEQUILIBRIUM: ICD-10-CM

## 2024-06-25 DIAGNOSIS — R42 DIZZINESS AND GIDDINESS: Primary | ICD-10-CM

## 2024-06-25 DIAGNOSIS — Z86.69 HISTORY OF MIGRAINE: ICD-10-CM

## 2024-06-25 DIAGNOSIS — Z86.73 HISTORY OF STROKE: ICD-10-CM

## 2024-06-25 DIAGNOSIS — Z71.3 WEIGHT LOSS COUNSELING, ENCOUNTER FOR: ICD-10-CM

## 2024-06-25 DIAGNOSIS — R42 VERTIGO: ICD-10-CM

## 2024-06-25 PROCEDURE — 97112 NEUROMUSCULAR REEDUCATION: CPT | Mod: PO

## 2024-06-25 RX ORDER — SEMAGLUTIDE 0.5 MG/.5ML
0.5 INJECTION, SOLUTION SUBCUTANEOUS
Qty: 4 ML | Refills: 0 | OUTPATIENT
Start: 2024-07-03

## 2024-06-25 RX ORDER — SEMAGLUTIDE 1 MG/.5ML
1 INJECTION, SOLUTION SUBCUTANEOUS
OUTPATIENT
Start: 2024-08-03

## 2024-06-25 NOTE — PROGRESS NOTES
[facility]   Physical Therapy Treatment Note      Name: Amara Reis  Clinic Number: [id number]    Therapy Diagnosis:   Encounter Diagnoses   Name Primary?    Dizziness and giddiness Yes    Vertigo     Dysequilibrium     History of migraine     History of stroke      Physician: Luiz Parra,*    Visit Date: 6/25/2024    Physician Orders: physical therapy evaluation and treat; vestibular rehab therapy   Medical Diagnosis from Referral: dizziness and giddiness  Evaluation Date: 6/3/2024  Authorization Period Expiration: 12/31/2024  Plan of Care Expiration: 07/20/2024  Visit # / Visits authorized: 2/ 30     Time In: 1508  Time Out: 1553  Total Billable Time: 45 minutes     Precautions: Fall; history of stroke and migraine (per patient)      Subjective     Patient reports: no dizziness nor headache; states feels like she's getting better.    She was compliant with home exercise program.  Response to previous treatment: no changes  Functional change: none    Pain: no complaints of pain    Dizziness: 0/10      Objective      Objective Measures updated at progress report unless specified.     Treatment     Amara received the treatments listed below:      neuromuscular re-education activities to improve: Balance and Vestibular/Central function for 45 minutes. The following activities were included:     X 3 each standing full body turns = clockwise*/counterclockwise*   X 5 each standing bilateral 90 degree turns   X 10 each standing head movements while holding target = up/down, side to side*   X 30 seconds oscillating on trampoline   X 28 each bilateral target training with head turns while standing on AirEx = repeating random words   X 5 minutes Wii balance tilt board  X 30 seconds each smooth pursuits (single target) while standing on AirEx = side to side, up and down  X 20 two-square saccades (repeating random words) while standing on AirEx = side to side  X 20 each VOR1  (repeating random words) while standing on AirEx = side to side, up and down  X 15 each balance therapeutic exercise on BOSU flat = heel raises/toe raises*, mini squats*       *minimal difficulty      Patient Education and Home Exercises       Education provided:   - proper therapeutic exercise technique  - continue home exercise program twice a day     Written Home Exercises Provided: Patient instructed to cont prior HEP.       Assessment     Patient reported minimal symptom elevation during full body turn and head rotation exercises - none reported during remaining habituation exercises; no symptom elevation reported while oscillating on trampoline and while performing target training with head turns; no loss of balance demonstrated while performing Wii balance training; cognitive task during standing saccades and VOR1 exercises progressed to repeating random words while standing on AirEx; no symptom elevation reported during all adaptation exercises; occasional loss of balance demonstrated during balance therapeutic exercise on BOSU flat.     Amara Is progressing fairly well towards her goals.   Patient prognosis is Fair.     Patient will continue to benefit from skilled outpatient physical therapy to address the deficits listed in the problem list box on initial evaluation, provide pt/family education and to maximize pt's level of independence in the home and community environment.     Patient's spiritual, cultural and educational needs considered and pt agreeable to plan of care and goals.     Anticipated barriers to physical therapy: severity of symptoms    Goals:     Short Term Goals (3 Weeks):   Maintain patient's complaints of dizziness to less than 5/10 during performance of activities of daily living for independence of self care activities. (MET)  Patient to tolerate x 45 seconds full Romberg stance, eyes closed to improve upright tolerance. (PART MET)  Patient to begin adaptation home exercise program.  (MET)     Long Term Goals (6 Weeks):   Patient to demonstrate competence with home exercise program to maintain therapeutic gains. (PART MET)  2.   Patient to ambulate 20 feet in less than 7 seconds to improve rad/symmetry. (NOT MET)  3.   Patient to perform floor ladder high stepping without loss of balance. (NOT MET)      Plan     Continue to progress balance and vestibular/central training to patient's tolerance.      Beau Will, PT

## 2024-06-26 DIAGNOSIS — Z71.3 WEIGHT LOSS COUNSELING, ENCOUNTER FOR: ICD-10-CM

## 2024-06-26 DIAGNOSIS — E66.09 CLASS 1 OBESITY DUE TO EXCESS CALORIES WITHOUT SERIOUS COMORBIDITY WITH BODY MASS INDEX (BMI) OF 30.0 TO 30.9 IN ADULT: ICD-10-CM

## 2024-06-26 RX ORDER — SEMAGLUTIDE 1 MG/.5ML
1 INJECTION, SOLUTION SUBCUTANEOUS
Qty: 4 ML | Refills: 2 | Status: SHIPPED | OUTPATIENT
Start: 2024-08-03

## 2024-06-26 RX ORDER — SEMAGLUTIDE 0.5 MG/.5ML
0.5 INJECTION, SOLUTION SUBCUTANEOUS
Qty: 2 ML | Refills: 0 | Status: SHIPPED | OUTPATIENT
Start: 2024-07-03

## 2024-07-27 ENCOUNTER — OFFICE VISIT (OUTPATIENT)
Dept: URGENT CARE | Facility: CLINIC | Age: 39
End: 2024-07-27
Payer: COMMERCIAL

## 2024-07-27 VITALS
HEART RATE: 100 BPM | WEIGHT: 155 LBS | OXYGEN SATURATION: 100 % | BODY MASS INDEX: 30.43 KG/M2 | HEIGHT: 60 IN | TEMPERATURE: 100 F | SYSTOLIC BLOOD PRESSURE: 144 MMHG | DIASTOLIC BLOOD PRESSURE: 95 MMHG | RESPIRATION RATE: 16 BRPM

## 2024-07-27 DIAGNOSIS — U07.1 COVID-19 VIRUS DETECTED: ICD-10-CM

## 2024-07-27 DIAGNOSIS — J02.0 STREP PHARYNGITIS: ICD-10-CM

## 2024-07-27 DIAGNOSIS — U07.1 COVID-19: Primary | ICD-10-CM

## 2024-07-27 DIAGNOSIS — R50.9 FEVER, UNSPECIFIED FEVER CAUSE: ICD-10-CM

## 2024-07-27 LAB
CTP QC/QA: YES
CTP QC/QA: YES
S PYO RRNA THROAT QL PROBE: POSITIVE
SARS-COV-2 AG RESP QL IA.RAPID: POSITIVE

## 2024-07-27 PROCEDURE — 87880 STREP A ASSAY W/OPTIC: CPT | Mod: QW,,,

## 2024-07-27 PROCEDURE — 99214 OFFICE O/P EST MOD 30 MIN: CPT | Mod: S$GLB,,,

## 2024-07-27 PROCEDURE — 87811 SARS-COV-2 COVID19 W/OPTIC: CPT | Mod: QW,S$GLB,,

## 2024-07-27 RX ORDER — NIRMATRELVIR AND RITONAVIR 300-100 MG
KIT ORAL
Qty: 30 TABLET | Refills: 0 | Status: SHIPPED | OUTPATIENT
Start: 2024-07-27 | End: 2024-08-02

## 2024-07-27 RX ORDER — AMOXICILLIN 500 MG/1
500 TABLET, FILM COATED ORAL EVERY 12 HOURS
Qty: 20 TABLET | Refills: 0 | Status: SHIPPED | OUTPATIENT
Start: 2024-07-27 | End: 2024-08-02

## 2024-07-27 NOTE — PROGRESS NOTES
Subjective:      Patient ID: Amara Reis is a 39 y.o. female.    Vitals:  height is 5' (1.524 m) and weight is 70.3 kg (155 lb). Her temperature is 100.2 °F (37.9 °C). Her blood pressure is 144/95 (abnormal) and her pulse is 100. Her respiration is 16 and oxygen saturation is 100%.     Chief Complaint: Sinus Problem    Patient states her  and child are both positive for covid and now she's having symptoms. Patient complains of fatigue, fever 102 T-max, right sided cervical pain, sore throat, congestion, dry cough, and body aches for 2 days.  She was COVID vaccinated.         Constitution: Positive for fatigue and fever.   HENT:  Positive for congestion, postnasal drip and sore throat.    Neck: Positive for painful lymph nodes.   Cardiovascular: Negative.    Eyes: Negative.    Respiratory:  Positive for cough.    Gastrointestinal: Negative.    Endocrine: negative.   Genitourinary: Negative.    Musculoskeletal:  Positive for muscle ache.   Skin: Negative.    Allergic/Immunologic: Positive for immunizations up-to-date.   Neurological:  Positive for headaches.   Hematologic/Lymphatic: Positive for swollen lymph nodes.   Psychiatric/Behavioral: Negative.        Objective:     Physical Exam   Constitutional: She is oriented to person, place, and time. She is cooperative. No distress.   HENT:   Head: Normocephalic and atraumatic.   Ears:   Right Ear: Hearing, external ear and ear canal normal.   Left Ear: Hearing, tympanic membrane, external ear and ear canal normal.   Nose: Nose normal.   Mouth/Throat: Uvula is midline, oropharynx is clear and moist and mucous membranes are normal. Mucous membranes are moist. Oropharynx is clear.   Tympanostomy tube present in right TM      Comments: Tympanostomy tube present in right TM  Eyes: Conjunctivae and lids are normal. Pupils are equal, round, and reactive to light. Extraocular movement intact   Neck: Trachea normal and phonation normal. Neck supple.   Cardiovascular:  Normal rate, regular rhythm, normal heart sounds and normal pulses.   Pulmonary/Chest: Effort normal and breath sounds normal.   Abdominal: Normal appearance. Soft. flat abdomen There is no abdominal tenderness.   Musculoskeletal: Normal range of motion.         General: Normal range of motion.   Lymphadenopathy:     She has cervical adenopathy.   Neurological: no focal deficit. She is alert, oriented to person, place, and time and at baseline. She has normal motor skills, normal sensation and intact cranial nerves (2-12). Gait and coordination normal. GCS eye subscore is 4. GCS verbal subscore is 5. GCS motor subscore is 6.   Skin: Skin is warm and dry. Capillary refill takes 2 to 3 seconds.   Psychiatric: She experiences Normal attention and Normal perception. Her speech is normal and behavior is normal. Mood, judgment and thought content normal.   Nursing note and vitals reviewed.      Assessment:     1. COVID-19    2. Fever, unspecified fever cause      2    Plan:       COVID-19  -     nirmatrelvir-ritonavir (PAXLOVID) 300 mg (150 mg x 2)-100 mg copackaged tablets (EUA); Take 3 tablets by mouth 2 (two) times daily. Each dose contains 2 nirmatrelvir (pink tablets) and 1 ritonavir (white tablet). Take all 3 tablets together  Dispense: 30 tablet; Refill: 0    Fever, unspecified fever cause  -     SARS Coronavirus 2 Antigen, POCT Manual Read  -     POCT rapid strep A    COVID risk score 2, is vaccinated.  No other treatments prior to arrival.  Vital signs are stable.  Rapid strep also positive

## 2024-07-29 ENCOUNTER — TELEPHONE (OUTPATIENT)
Dept: OTOLARYNGOLOGY | Facility: CLINIC | Age: 39
End: 2024-07-29
Payer: COMMERCIAL

## 2024-07-29 ENCOUNTER — NURSE TRIAGE (OUTPATIENT)
Dept: ADMINISTRATIVE | Facility: CLINIC | Age: 39
End: 2024-07-29
Payer: COMMERCIAL

## 2024-07-29 NOTE — TELEPHONE ENCOUNTER
Spoke with pt regard triage message. Per pt, She was eval/tx at Urgent Care on this past Saturday due to positive Covid exposure/symptoms. Pt also states her  was Covid positive last Tuesday but better today. Pt test positive for Covid and Strep and tx with Amoxil and Paxlovid. Pt states she is feeling warm to touch but have not took temperature, no SOB, or abnormal signs/symptoms. Pt adv to continue meds as prescribed with plenty of fluid intake and Tylenol for fever/body aches. Pt instructed to proceed to the nearest ED id she develop any abnormal signs or symptoms described throughout phone call. Pt adv to continue quarantined until she is 24 hour fever free without med intake. Pt fully aware of information given with no further questions or concerns.

## 2024-07-29 NOTE — TELEPHONE ENCOUNTER
Pt calling with c/o covid symtpoms and has got a rx for paxlovid but hasn't picked it up since the pharmacy didn't have it but she said that they were going to transfer it to [facility] for her/ Pt denies triage and will call back if any other questions or concerns. I will route to provider so that they know that pt was dx.   Reason for Disposition   Health information question, no triage required and triager able to answer question    Protocols used: Information Only Call - No Triage-A-[place]

## 2024-07-29 NOTE — TELEPHONE ENCOUNTER
Spoke w/pt regarding upcoming ENT appt for tomorrow w/Fidel.  Pt has active COVID-19 status due to positive test done on 07/27.  W/pt, discussed that ENT appt will need to be r/s; pt acknoweldged.  ENT appt r/s w/pt for 08/07 @ 1045 w/Fidel.  Pt has no further ENT questions/concerns at this time.

## 2024-08-02 ENCOUNTER — OFFICE VISIT (OUTPATIENT)
Dept: FAMILY MEDICINE | Facility: CLINIC | Age: 39
End: 2024-08-02
Payer: COMMERCIAL

## 2024-08-02 VITALS
HEIGHT: 60 IN | BODY MASS INDEX: 29.78 KG/M2 | WEIGHT: 151.69 LBS | DIASTOLIC BLOOD PRESSURE: 97 MMHG | HEART RATE: 88 BPM | SYSTOLIC BLOOD PRESSURE: 139 MMHG | OXYGEN SATURATION: 100 %

## 2024-08-02 DIAGNOSIS — E66.09 CLASS 1 OBESITY DUE TO EXCESS CALORIES WITHOUT SERIOUS COMORBIDITY WITH BODY MASS INDEX (BMI) OF 30.0 TO 30.9 IN ADULT: ICD-10-CM

## 2024-08-02 DIAGNOSIS — Z71.3 WEIGHT LOSS COUNSELING, ENCOUNTER FOR: ICD-10-CM

## 2024-08-02 PROCEDURE — 99999 PR PBB SHADOW E&M-EST. PATIENT-LVL III: CPT | Mod: PBBFAC,,, | Performed by: FAMILY MEDICINE

## 2024-08-02 RX ORDER — SEMAGLUTIDE 1 MG/.5ML
1 INJECTION, SOLUTION SUBCUTANEOUS
Qty: 4 ML | Refills: 2 | Status: SHIPPED | OUTPATIENT
Start: 2024-08-03

## 2024-08-02 NOTE — PROGRESS NOTES
"  SUBJECTIVE:    Patient ID: Amara Reis is a 39 y.o. female.    Chief Complaint: Weight Loss  39 yo female new to this provider.She has a history of Obesity and comes in to discuss medical weight management. Past medical history is notable for PCOS. Pt was seen in the Past by Dr Payton and Placed on Phenteramine byut she has continued to gain weight, pt states that she has contacted her insurance company and they are willing to pay for GLP-1 medication for weight loss.  I expressed That I will prescribe the medication but it is up to her inzurance company to pay for this medication if they are not willing to pay for the medication she can pay out of pocket for this medication.           High school weight:  85  Ideal Body weight 100     06/23/24 160  08/02/24 151     History of uncontrolled hypertension? no  History of cardiovascular disease? no  History of glaucoma? no  History of eating disorder? As a child, used to starve self "to stay skinny" under her mother's guidance. Was 85 lb at first pregnancy  History of substance use? none  History of cholelithiasis? no  History of pancreatitis? no  Personal or family history of Medullary Thyroid Cancer or MEN2? No    HPI      Past Medical History:   Diagnosis Date    Anemia     Clostridium difficile colitis     hx--reoccurs with antibiotic use    Cystitis 05/20/2014    Endometriosis     Glucose intolerance     Hypertrophy of nasal turbinates 02/18/2019    Intramural leiomyoma of uterus 12/01/2016    Ovarian cyst     Ovarian cyst 05/20/2014    Pelvic pain in female     Sleep apnea, unspecified     Thyroid disease     Thyroid nodule    Urinary tract infection, site not specified      Social History     Socioeconomic History    Marital status:    Tobacco Use    Smoking status: Never     Passive exposure: Never    Smokeless tobacco: Never   Substance and Sexual Activity    Alcohol use: No     Comment: rarely    Drug use: Never    Sexual activity: Yes     " Partners: Male     Social Determinants of Health     Financial Resource Strain: Medium Risk (5/16/2024)    Overall Financial Resource Strain (CARDIA)     Difficulty of Paying Living Expenses: Somewhat hard   Food Insecurity: No Food Insecurity (5/16/2024)    Hunger Vital Sign     Worried About Running Out of Food in the Last Year: Never true     Ran Out of Food in the Last Year: Never true   Transportation Needs: No Transportation Needs (2/13/2023)    [facility]    PRAPARE - Transportation     Lack of Transportation (Medical): No     Lack of Transportation (Non-Medical): No   Physical Activity: Inactive (5/16/2024)    Exercise Vital Sign     Days of Exercise per Week: 0 days     Minutes of Exercise per Session: 0 min   Stress: Stress Concern Present (5/16/2024)    [employer] - Occupational Stress Questionnaire     Feeling of Stress : Rather much   Housing Stability: Unknown (5/16/2024)    Housing Stability Vital Sign     Unable to Pay for Housing in the Last Year: No     Past Surgical History:   Procedure Laterality Date    DILATION AND CURETTAGE OF UTERUS      Due to miscarriage.    EXCISION OF LESION OF NOSE Bilateral 02/18/2019    Procedure: EXCISION, LESION, NOSE;  Surgeon: Chadd Ward MD;  Location: [facility] OR;  Service: ENT;  Laterality: Bilateral;    LASER LAPAROSCOPY  07/01/2013    pt has had 3 surgies     MYOMECTOMY  2013    Lasered endometriosis.    MYRINGOTOMY WITH INSERTION OF VENTILATION TUBE Right 02/18/2019    Procedure: MYRINGOTOMY, WITH TYMPANOSTOMY TUBE INSERTION;  Surgeon: Chadd Ward MD;  Location: [facility] OR;  Service: ENT;  Laterality: Right;    NM EXPLORATORY OF ABDOMEN  2012    NM EXPLORATORY OF ABDOMEN  04/15/2014    VAGINAL DELIVERY      x2 normal     Family History   Problem Relation  Name Age of Onset    Hypertension Mother Candy mccauley     Miscarriages / Stillbirths Mother Candy mccauley     Fibromyalgia Mother Candy mccauley     Hypertension Father Philippe mccauley     Thyroid disease Brother      Breast cancer Neg Hx      Colon cancer Neg Hx       Current Outpatient Medications   Medication Sig Dispense Refill    ergocalciferol (VITAMIN D2) 50,000 unit Cap       multivit 47-iron-folate 1-dha (WESCAP-PN DHA) 27 mg iron-1 mg -300 mg Cap Take 1 tablet by mouth once daily.      tranexamic acid (LYSTEDA) 650 mg tablet       amoxicillin (AMOXIL) 500 MG Tab Take 1 tablet (500 mg total) by mouth every 12 (twelve) hours. for 10 days (Patient not taking: Reported on 8/2/2024) 20 tablet 0    diazePAM (VALIUM) 10 MG Tab Take 1 tab (10mg) by mouth 1 hour prior MRI. 1 tablet 0    semaglutide, weight loss, (WEGOVY) 1 mg/0.5 mL PnIj Inject 1 mg into the skin every 7 days. (Patient not taking: Reported on 8/2/2024) 2 mL 1    [START ON 8/3/2024] semaglutide, weight loss, (WEGOVY) 1 mg/0.5 mL PnIj Inject 1 mg into the skin every 7 days. 4 mL 2     No current facility-administered medications for this visit.     Review of patient's allergies indicates:   Allergen Reactions    Morphine Hives, Itching and Nausea And Vomiting    Flagyl [metronidazole hcl] Other (See Comments)     Oral form only--burns her   stomach. Take IV only.  Had to have surgery to fix this       Review of Systems   Constitutional:  Negative for activity change, diaphoresis, fatigue and unexpected weight change.   HENT:  Positive for hearing loss. Negative for congestion, postnasal drip, rhinorrhea and trouble swallowing.    Eyes:  Negative for discharge and visual disturbance.   Respiratory:  Negative for cough, chest tightness, shortness of breath and wheezing.    Cardiovascular:  Negative for chest pain and palpitations.   Gastrointestinal:  Negative for abdominal distention, abdominal pain, anal bleeding, blood in stool, constipation,  diarrhea and vomiting.   Endocrine: Negative for polydipsia and polyuria.   Genitourinary:  Negative for difficulty urinating, dysuria, hematuria and menstrual problem.   Musculoskeletal:  Negative for arthralgias, joint swelling and neck pain.   Neurological:  Negative for weakness and headaches.   Psychiatric/Behavioral:  Negative for confusion and dysphoric mood.           Blood pressure (!) 139/97, pulse 88, height 5' (1.524 m), weight 68.8 kg (151 lb 11.2 oz), last menstrual period 06/30/2024, SpO2 100%, not currently breastfeeding. Body mass index is 29.63 kg/m².   Objective:      Physical Exam  Vitals reviewed.   Constitutional:       General: She is not in acute distress.     Appearance: She is well-developed.   HENT:      Head: Normocephalic and atraumatic.      Right Ear: External ear normal.      Left Ear: External ear normal.   Eyes:      General:         Right eye: No discharge.         Left eye: No discharge.      Conjunctiva/sclera: Conjunctivae normal.      Pupils: Pupils are equal, round, and reactive to light.   Cardiovascular:      Rate and Rhythm: Normal rate and regular rhythm.      Heart sounds: Normal heart sounds. No murmur heard.  Pulmonary:      Effort: Pulmonary effort is normal. No respiratory distress.      Breath sounds: Normal breath sounds.   Skin:     General: Skin is warm and dry.             Assessment:       1. Class 1 obesity due to excess calories without serious comorbidity with body mass index (BMI) of 30.0 to 30.9 in adult    2. Weight loss counseling, encounter for         Plan:           Class 1 obesity due to excess calories without serious comorbidity with body mass index (BMI) of 30.0 to 30.9 in adult  -     semaglutide, weight loss, (WEGOVY) 1 mg/0.5 mL PnIj; Inject 1 mg into the skin every 7 days.  Dispense: 4 mL; Refill: 2    Weight loss counseling, encounter for  -     semaglutide, weight loss, (WEGOVY) 1 mg/0.5 mL PnIj; Inject 1 mg into the skin every 7 days.   Dispense: 4 mL; Refill: 2

## 2024-08-13 ENCOUNTER — OFFICE VISIT (OUTPATIENT)
Dept: FAMILY MEDICINE | Facility: CLINIC | Age: 39
End: 2024-08-13
Payer: COMMERCIAL

## 2024-08-13 ENCOUNTER — OFFICE VISIT (OUTPATIENT)
Dept: OTOLARYNGOLOGY | Facility: CLINIC | Age: 39
End: 2024-08-13
Payer: COMMERCIAL

## 2024-08-13 VITALS
RESPIRATION RATE: 20 BRPM | HEART RATE: 88 BPM | TEMPERATURE: 98 F | SYSTOLIC BLOOD PRESSURE: 120 MMHG | DIASTOLIC BLOOD PRESSURE: 88 MMHG | BODY MASS INDEX: 29.84 KG/M2 | HEIGHT: 60 IN | WEIGHT: 152 LBS

## 2024-08-13 VITALS — RESPIRATION RATE: 18 BRPM | HEIGHT: 60 IN | BODY MASS INDEX: 29.86 KG/M2 | WEIGHT: 152.13 LBS

## 2024-08-13 DIAGNOSIS — Z86.69 HISTORY OF HEARING LOSS: ICD-10-CM

## 2024-08-13 DIAGNOSIS — H69.92 DYSFUNCTION OF LEFT EUSTACHIAN TUBE: Primary | ICD-10-CM

## 2024-08-13 DIAGNOSIS — M25.562 ACUTE PAIN OF LEFT KNEE: ICD-10-CM

## 2024-08-13 DIAGNOSIS — R42 DIZZINESS: ICD-10-CM

## 2024-08-13 DIAGNOSIS — E78.5 HYPERLIPIDEMIA, UNSPECIFIED HYPERLIPIDEMIA TYPE: ICD-10-CM

## 2024-08-13 DIAGNOSIS — G44.209 ACUTE NON INTRACTABLE TENSION-TYPE HEADACHE: Primary | ICD-10-CM

## 2024-08-13 DIAGNOSIS — R53.83 FATIGUE, UNSPECIFIED TYPE: ICD-10-CM

## 2024-08-13 PROCEDURE — 99999 PR PBB SHADOW E&M-EST. PATIENT-LVL IV: CPT | Mod: PBBFAC,,, | Performed by: NURSE PRACTITIONER

## 2024-08-13 PROCEDURE — 3044F HG A1C LEVEL LT 7.0%: CPT | Mod: CPTII,S$GLB,, | Performed by: NURSE PRACTITIONER

## 2024-08-13 PROCEDURE — 1160F RVW MEDS BY RX/DR IN RCRD: CPT | Mod: CPTII,S$GLB,, | Performed by: PHYSICIAN ASSISTANT

## 2024-08-13 PROCEDURE — 1159F MED LIST DOCD IN RCRD: CPT | Mod: CPTII,S$GLB,, | Performed by: PHYSICIAN ASSISTANT

## 2024-08-13 PROCEDURE — 3044F HG A1C LEVEL LT 7.0%: CPT | Mod: CPTII,S$GLB,, | Performed by: PHYSICIAN ASSISTANT

## 2024-08-13 PROCEDURE — 3008F BODY MASS INDEX DOCD: CPT | Mod: CPTII,S$GLB,, | Performed by: NURSE PRACTITIONER

## 2024-08-13 PROCEDURE — 3008F BODY MASS INDEX DOCD: CPT | Mod: CPTII,S$GLB,, | Performed by: PHYSICIAN ASSISTANT

## 2024-08-13 PROCEDURE — 3079F DIAST BP 80-89 MM HG: CPT | Mod: CPTII,S$GLB,, | Performed by: NURSE PRACTITIONER

## 2024-08-13 PROCEDURE — 3074F SYST BP LT 130 MM HG: CPT | Mod: CPTII,S$GLB,, | Performed by: NURSE PRACTITIONER

## 2024-08-13 PROCEDURE — 99214 OFFICE O/P EST MOD 30 MIN: CPT | Mod: S$GLB,,, | Performed by: PHYSICIAN ASSISTANT

## 2024-08-13 PROCEDURE — 99999 PR PBB SHADOW E&M-EST. PATIENT-LVL IV: CPT | Mod: PBBFAC,,, | Performed by: PHYSICIAN ASSISTANT

## 2024-08-13 PROCEDURE — 99214 OFFICE O/P EST MOD 30 MIN: CPT | Mod: S$GLB,,, | Performed by: NURSE PRACTITIONER

## 2024-08-13 RX ORDER — BUTALBITAL, ACETAMINOPHEN AND CAFFEINE 50; 325; 40 MG/1; MG/1; MG/1
1 TABLET ORAL EVERY 4 HOURS PRN
Qty: 30 TABLET | Refills: 0 | Status: SHIPPED | OUTPATIENT
Start: 2024-08-13 | End: 2024-09-12

## 2024-08-13 RX ORDER — FLUTICASONE PROPIONATE 50 MCG
1 SPRAY, SUSPENSION (ML) NASAL 2 TIMES DAILY
Qty: 48 G | Refills: 0 | Status: SHIPPED | OUTPATIENT
Start: 2024-08-13

## 2024-08-13 RX ORDER — AZELASTINE 1 MG/ML
1 SPRAY, METERED NASAL 2 TIMES DAILY
Qty: 90 ML | Refills: 0 | Status: SHIPPED | OUTPATIENT
Start: 2024-08-13 | End: 2025-08-13

## 2024-08-13 NOTE — PROGRESS NOTES
Patient ID: Amara Reis is a 39 y.o. female.    Chief Complaint: Fatigue (40 yo female here c/o feeling fatigue with memory fog after argument with kids father on 08/06/2024. Pt B/P was also elevated after argument. KM)    Mrs Reis presents today for evaluation of several issues. She states she she has been really fatigued and not feeling well overall. She states this has been a constant issue since she was diagnosed with Covid She had an instance of elevated blood pressure but it has since resolved. She denies any other issues or complaints.     Fatigue  This is a new problem. The current episode started 1 to 4 weeks ago. The problem has been waxing and waning. Associated symptoms include chest pain, fatigue, headaches, neck pain, numbness and weakness. Pertinent negatives include no arthralgias, joint swelling or vomiting (fatigu). Nothing aggravates the symptoms. She has tried immobilization for the symptoms. The treatment provided mild relief.   Headache   This is a new problem. The current episode started more than 1 year ago. The problem has been waxing and waning. The pain is located in the Bilateral region. The pain does not radiate. The pain quality is similar to prior headaches. The quality of the pain is described as aching. The pain is at a severity of 7/10. Associated symptoms include hearing loss, neck pain, numbness and weakness. Pertinent negatives include no rhinorrhea or vomiting (fatigu). Nothing aggravates the symptoms. She has tried nothing for the symptoms. The treatment provided mild relief. Her past medical history is significant for migraine headaches and migraines in the family.   Knee Pain   The incident occurred 3 to 5 days ago. There was no injury mechanism. The pain is present in the left knee. The pain is at a severity of 6/10. The pain is moderate. The pain has been Constant since onset. Associated symptoms include numbness. She reports no foreign bodies present. The symptoms  are aggravated by movement. She has tried acetaminophen for the symptoms. The treatment provided mild relief.           Past Medical History:   Diagnosis Date    Anemia     Clostridium difficile colitis     hx--reoccurs with antibiotic use    Cystitis 05/20/2014    Endometriosis     Glucose intolerance     Hypertrophy of nasal turbinates 02/18/2019    Intramural leiomyoma of uterus 12/01/2016    Ovarian cyst     Ovarian cyst 05/20/2014    Pelvic pain in female     Sleep apnea, unspecified     Thyroid disease     Thyroid nodule    Urinary tract infection, site not specified      Past Surgical History:   Procedure Laterality Date    DILATION AND CURETTAGE OF UTERUS      Due to miscarriage.    EXCISION OF LESION OF NOSE Bilateral 02/18/2019    Procedure: EXCISION, LESION, NOSE;  Surgeon: Chadd Ward MD;  Location: [facility] OR;  Service: ENT;  Laterality: Bilateral;    LASER LAPAROSCOPY  07/01/2013    pt has had 3 surgies     MYOMECTOMY  2013    Lasered endometriosis.    MYRINGOTOMY WITH INSERTION OF VENTILATION TUBE Right 02/18/2019    Procedure: MYRINGOTOMY, WITH TYMPANOSTOMY TUBE INSERTION;  Surgeon: Chadd Ward MD;  Location: [facility] OR;  Service: ENT;  Laterality: Right;    MT EXPLORATORY OF ABDOMEN  2012    MT EXPLORATORY OF ABDOMEN  04/15/2014    VAGINAL DELIVERY      x2 normal         Tobacco History:  reports that she has never smoked. She has never been exposed to tobacco smoke. She has never used smokeless tobacco.      Review of patient's allergies indicates:   Allergen Reactions    Morphine Hives, Itching and Nausea And Vomiting    Flagyl [metronidazole hcl] Other (See Comments)     Oral form only--burns her   stomach. Take IV only.  Had to have surgery to fix this       Current Outpatient Medications:     ergocalciferol (VITAMIN D2) 50,000 unit Cap, , Disp: , Rfl:     multivit 47-iron-folate 1-dha (WESCAP-PN DHA) 27 mg iron-1 mg -300 mg Cap, Take 1 tablet by mouth once daily., Disp: , Rfl:      semaglutide, weight loss, (WEGOVY) 1 mg/0.5 mL PnIj, Inject 1 mg into the skin every 7 days., Disp: 4 mL, Rfl: 2    tranexamic acid (LYSTEDA) 650 mg tablet, , Disp: , Rfl:     azelastine (ASTELIN) 137 mcg (0.1 %) nasal spray, 1 spray (137 mcg total) by Nasal route 2 (two) times daily. Point up and slightly outward toward ear when spraying to avoid irritating nasal septum., Disp: 90 mL, Rfl: 0    butalbital-acetaminophen-caffeine -40 mg (FIORICET, ESGIC) -40 mg per tablet, Take 1 tablet by mouth every 4 (four) hours as needed for Headaches., Disp: 30 tablet, Rfl: 0    fluticasone propionate (FLONASE) 50 mcg/actuation nasal spray, 1 spray (50 mcg total) by Each Nostril route 2 (two) times daily. Point up and slightly outward toward ear when spraying to avoid irritating nasal septum., Disp: 48 g, Rfl: 0    Review of Systems   Constitutional:  Positive for activity change and fatigue. Negative for unexpected weight change.   HENT:  Positive for hearing loss. Negative for rhinorrhea and trouble swallowing.    Eyes:  Negative for discharge and visual disturbance.   Respiratory:  Negative for chest tightness and wheezing.    Cardiovascular:  Positive for chest pain. Negative for palpitations.   Gastrointestinal:  Negative for blood in stool, constipation, diarrhea and vomiting (fatigu).   Endocrine: Negative for polydipsia and polyuria.   Genitourinary:  Negative for difficulty urinating, dysuria, hematuria and menstrual problem.   Musculoskeletal:  Positive for neck pain. Negative for arthralgias and joint swelling.   Neurological:  Positive for weakness, numbness and headaches.   Psychiatric/Behavioral:  Negative for confusion and dysphoric mood.           Objective:      Vitals:    08/13/24 1335   BP: 120/88   Pulse: 88   Resp: 20   Temp: 98 °F (36.7 °C)   TempSrc: Oral   Weight: 68.9 kg (152 lb)   Height: 5' (1.524 m)     Physical Exam  Constitutional:       Appearance: Normal appearance.   Cardiovascular:       Rate and Rhythm: Normal rate and regular rhythm.      Heart sounds: Normal heart sounds.   Pulmonary:      Effort: Pulmonary effort is normal.      Breath sounds: Normal breath sounds.   Neurological:      Mental Status: She is alert and oriented to person, place, and time. Mental status is at baseline.   Psychiatric:         Mood and Affect: Mood normal.         Behavior: Behavior normal.           Assessment:       1. Acute non intractable tension-type headache    2. Fatigue, unspecified type    3. Acute pain of left knee    4. Hyperlipidemia, unspecified hyperlipidemia type           Plan:       Acute non intractable tension-type headache  -     CT Head Without Contrast; Future; Expected date: 08/13/2024  -     butalbital-acetaminophen-caffeine -40 mg (FIORICET, ESGIC) -40 mg per tablet; Take 1 tablet by mouth every 4 (four) hours as needed for Headaches.  Dispense: 30 tablet; Refill: 0    Fatigue, unspecified type  -     CBC W/ AUTO DIFFERENTIAL; Future; Expected date: 08/13/2024  -     COMPREHENSIVE METABOLIC PANEL; Future; Expected date: 08/13/2024  -     TSH; Future; Expected date: 08/13/2024    Acute pain of left knee  -     X-Ray Knee 1 or 2 View Left; Future; Expected date: 08/13/2024    Hyperlipidemia, unspecified hyperlipidemia type  -     LIPID PANEL; Future; Expected date: 08/13/2024      Follow up in about 6 months (around 2/13/2025), or if symptoms worsen or fail to improve.        8/22/2024 Sejal Guerrero NP    Patient left without being seen   Answers submitted by the patient for this visit:  Review of Systems Questionnaire (Submitted on 8/9/2024)  activity change: Yes  unexpected weight change: No  neck pain: No  hearing loss: Yes  rhinorrhea: No  trouble swallowing: No  eye discharge: No  visual disturbance: No  chest tightness: No  wheezing: No  chest pain: Yes  palpitations: No  blood in stool: No  constipation: No  vomiting: No  diarrhea: No  polydipsia: No  polyuria:  No  difficulty urinating: No  hematuria: No  menstrual problem: No  dysuria: No  joint swelling: No  arthralgias: No  headaches: Yes  weakness: Yes  confusion: No  dysphoric mood: No

## 2024-08-13 NOTE — PATIENT INSTRUCTIONS
fluticasone propionate (FLONASE) 50 mcg/actuation nasal spray 48 g 0 8/13/2024 -- No   Sig - Route: 1 spray (50 mcg total) by Each Nostril route 2 (two) times daily. Point up and slightly outward toward ear when spraying to avoid irritating nasal septum. - Each Nostril      Disp Refills Start End LUANA   azelastine (ASTELIN) 137 mcg (0.1 %) nasal spray 90 mL 0 8/13/2024 8/13/2025 No   Sig - Route: 1 spray (137 mcg total) by Nasal route 2 (two) times daily. Point up and slightly outward toward ear when spraying to avoid irritating nasal septum. - Nasal       THROAT CLEARING     Why am I clearing my throat so often?   Irritation of the vocal folds and surrounding area can cause the urge to clear your throat. This irritation can be caused by acid reflux, allergies, or environmental factors, as well as from a cold or sore throat. Talk with your doctor about the treatment of possible underlying causes. However, throat clearing can turn into a cycle. You clear your throat after feeling an irritation, which causes more irritation, which causes you to continue clearing your throat, which causes more irritation.     What can I do about it?   Ask a friend or family member to help you keep track - you may not even realize how often you do it.   Replace the throat clearing with a different behavior.   Take a sip of water and then swallow. Repeat until the sensation subsides.  Swallow hard (even without water)   Use the silent throat clear method by making the h sound when you exhale  Breathe in deeply through your nose and exhale on shhh   Hum quietly   Keep yourself hydrated.   Drink plenty of water   Eat wet snacks (grapes, apples, melon, cucumber)   Use a humidifier at home or at work   Suck on hard candies, but avoid too much sugar and avoid anything with mint, menthol, camphor, or eucaplyptus, as these will have a more irritating effect.

## 2024-08-13 NOTE — PROGRESS NOTES
[facility]    Subjective:      Patient: Amara Reis Patient PCP: Sejal Guerrero FNP-C         :  1985     Sex:  female      MRN:  [id number]          Date of Visit: 2024      Chief Complaint: Dizziness follow up    Interval History 2024: MRI Brain w/wo 2024 showed slightly high riding right jugular bulb. She had non-occlusive stenosis of the dural sinuses left greater than right and a few scattered punctate foci of subcortical white matter in the frontal lobes. She did vestibular physical therapy and this helped with her dizziness. She no longer has issues with dizziness. She knows someone who went on the same cruise boat that she was on after her trip and suffered similar symptoms. Pt tested positive for covid19 2024 and strep. She was treated with oral abx. She did have severe left ear pain, but now just has left aural fullness with pain going down under ear. She is not currently having fever/chills or ear discharge. Her dizziness has resolved. She has had issues with mucous in her throat and globus sensation since covid19. She is not having issues with acid reflux.     Patient ID 2024: Amara Reis is a 39 y.o. female who presents to office for evaluation of dizziness. Pt reports h/o stroke in . Pt had MRI Brain WO 2023 that was negative for acute intracranial abnormality. MRV Brain WO 2023 normal. MRA brain WO 2023 showed medial course of the right cavernous carotid artery, which is normal variant and was otherwise negative. Pt reports history of migraine, but states that she has not had recent issues with headache. Pt states that she had episode of dizziness without true vertigo May 13, 2024. It was not associated with head movements. Pt has been having ongoing issues with episodic dizziness without true vertigo that happens at random and not associated with positional changes. Pt has long-standing h/o hearing loss. Pt has episodic  high-pitched non-pulsatile tinnitus bilaterally. No change in hearing or tinnitus in association with her dizziness. Pt was prescribed meclizine for her dizziness, which did not help with her dizziness. Pt was then prescribed xanax 0.5mg TID PRN, which did help with her dizziness. Pt states that she was having right ear drainage. She has h/o multiple PE/T tubes. Pt believes her last set of tubes were placed around 5 years ago. She reports that her left T-tube fell out, but she still has right T-tube. Pt denies fever/chills, chest pain/palpitations or ear pain.     Past Medical History  She has a past medical history of Anemia, Clostridium difficile colitis, Cystitis, Endometriosis, Glucose intolerance, Hypertrophy of nasal turbinates, Intramural leiomyoma of uterus, Ovarian cyst, Ovarian cyst, Pelvic pain in female, Sleep apnea, unspecified, Thyroid disease, and Urinary tract infection, site not specified.    Family History  Her family history includes Fibromyalgia in her mother; Hypertension in her father and mother; Miscarriages / Stillbirths in her mother; Thyroid disease in her brother.    Past Surgical History:   Procedure Laterality Date    DILATION AND CURETTAGE OF UTERUS      Due to miscarriage.    EXCISION OF LESION OF NOSE Bilateral 02/18/2019    Procedure: EXCISION, LESION, NOSE;  Surgeon: Chadd Ward MD;  Location: [facility] OR;  Service: ENT;  Laterality: Bilateral;    LASER LAPAROSCOPY  07/01/2013    pt has had 3 surgies     MYOMECTOMY  2013    Lasered endometriosis.    MYRINGOTOMY WITH INSERTION OF VENTILATION TUBE Right 02/18/2019    Procedure: MYRINGOTOMY, WITH TYMPANOSTOMY TUBE INSERTION;  Surgeon: Chadd Ward MD;  Location: [facility] OR;  Service: ENT;  Laterality: Right;    UT EXPLORATORY OF ABDOMEN  2012    UT EXPLORATORY OF ABDOMEN  04/15/2014    VAGINAL DELIVERY      x2 normal     Social History     Tobacco Use    Smoking status: Never     Passive exposure: Never    Smokeless tobacco: Never    Substance and Sexual Activity    Alcohol use: No     Comment: rarely    Drug use: Never    Sexual activity: Yes     Partners: Male     Medications  She has a current medication list which includes the following prescription(s): azelastine, butalbital-acetaminophen-caffeine -40 mg, ergocalciferol, fluticasone propionate, wescap-pn dha, wegovy, and tranexamic acid.    Review of patient's allergies indicates:   Allergen Reactions    Morphine Hives, Itching and Nausea And Vomiting    Flagyl [metronidazole hcl] Other (See Comments)     Oral form only--burns her   stomach. Take IV only.  Had to have surgery to fix this     All medications, allergies, and past history have been reviewed.    Objective:      Vitals:      8/2/2024    10:58 AM 8/13/2024     1:35 PM 8/13/2024     2:05 PM   Vitals - 1 value per visit   SYSTOLIC 137 120    DIASTOLIC 91 88    Pulse 88 88    Temp  98 °F (36.7 °C)    Resp  20 18   SPO2 100 %     Weight (lb) 151.7 152 152.12   Weight (kg) 68.811 68.947 69   Height 5' (1.524 m) 5' (1.524 m) 5' (1.524 m)   BMI (Calculated) 29.6 29.7 29.7   Pain Score Zero Zero Zero       Body surface area is 1.71 meters squared.    Physical Exam  Constitutional:       General: She is not in acute distress.     Appearance: Normal appearance. She is not ill-appearing.   HENT:      Head: Normocephalic and atraumatic.      Right Ear: Ear canal and external ear normal. There is impacted cerumen.      Left Ear: Tympanic membrane, ear canal and external ear normal.      Ears:        Nose: Nose normal.      Mouth/Throat:      Lips: Pink. No lesions.      Mouth: Mucous membranes are moist. No oral lesions.      Tongue: No lesions.      Palate: No lesions.      Pharynx: Oropharynx is clear. Uvula midline. No pharyngeal swelling, oropharyngeal exudate, posterior oropharyngeal erythema or uvula swelling.      Tonsils: No tonsillar exudate or tonsillar abscesses. 2+ on the right. 2+ on the left.   Eyes:      General:          Right eye: No discharge.         Left eye: No discharge.      Extraocular Movements: Extraocular movements intact.      Conjunctiva/sclera: Conjunctivae normal.   Pulmonary:      Effort: Pulmonary effort is normal.   Neurological:      General: No focal deficit present.      Mental Status: She is alert and oriented to person, place, and time. Mental status is at baseline.   Psychiatric:         Mood and Affect: Mood normal.         Behavior: Behavior normal.         Thought Content: Thought content normal.         Judgment: Judgment normal.           Labs:  WBC   Date Value Ref Range Status   01/09/2024 9.07 3.90 - 12.70 K/uL Final     Platelets   Date Value Ref Range Status   01/09/2024 272 150 - 450 K/uL Final     Creatinine   Date Value Ref Range Status   01/08/2024 0.8 0.5 - 1.4 mg/dL Final     TSH   Date Value Ref Range Status   06/06/2023 0.801 0.400 - 4.000 uIU/mL Final     Glucose   Date Value Ref Range Status   01/08/2024 124 (H) 70 - 110 mg/dL Final     Hemoglobin A1C   Date Value Ref Range Status   01/08/2024 4.8 4.0 - 5.6 % Final     Comment:     ADA Screening Guidelines:  5.7-6.4%  Consistent with prediabetes  >or=6.5%  Consistent with diabetes    High levels of fetal hemoglobin interfere with the HbA1C  assay. Heterozygous hemoglobin variants (HbS, HgC, etc)do  not significantly interfere with this assay.   However, presence of multiple variants may affect accuracy.       MRV Brain without contrast Results for orders placed during the hospital encounter of 01/28/23    MRV Brain Without Contrast    Narrative  EXAMINATION:  MRV BRAIN WITHOUT CONTRAST    CLINICAL HISTORY:  G43.009 Migraine WO aura and WO status migrainosus, not intractable;  Migraine without aura, not intractable, without status migrainosus    TECHNIQUE:  Noncontrast 2-D time-of-flight MRV of the head with coronal and sagittal source imaging and 3-dimensional MIP reconstructions.    COMPARISON:  MRI same day.    FINDINGS:  The superior  sagittal sinus is patent.  The paired internal cerebral veins are patent.  The straight sinus is patent.    The bilateral transverse and sigmoid dural venous sinuses are patent to the jugular foramen.    Impression  Normal MRV of the brain.    MRI Brain without contrast Results for orders placed during the hospital encounter of 01/28/23    MRI Brain Without Contrast    Narrative  EXAMINATION:  MRI BRAIN WITHOUT CONTRAST    CLINICAL HISTORY:  G43.009 Migraine WO aura and WO status migrainosus, not intractable; Migraine without aura, not intractable, without status migrainosus    TECHNIQUE:  Multiplanar multisequence MR imaging of the brain was performed without contrast.    COMPARISON:  CT 09/28/2022.    FINDINGS:  There is no acute hemorrhage or infarction.  Diffusion-weighted images demonstrate no restricted diffusion of acute ischemia.    There is a normal pattern of gray-white matter differentiation.    No extra-axial fluid collections.  The ventricles are normal size, shape and configuration.  Basal cisterns are patent.  The major intracranial vessels demonstrate normal flow voids.    The imaged paranasal sinuses and ethmoid air cells are well aerated.  The mastoid air cells are normally pneumatized.    Impression  No acute intracranial abnormality.    MRA Brain without contrast Results for orders placed during the hospital encounter of 01/31/23    MRA Brain without contrast    Narrative  EXAMINATION:  MRA BRAIN WITHOUT CONTRAST    CLINICAL HISTORY:  g43.009; Migraine without aura, not intractable, without status migrainosus.    TECHNIQUE:  Non-contrast 3-D time-of-flight intracranial MR angiography was performed through the [place] [facility] [place] with MIP reformatting.    COMPARISON:  MRI/MRV 01/28/2023.  CT of the head 09/28/2022.    FINDINGS  Vasculature: The intracranial arteries show normal anatomy without evidence of major branch occlusion or focal luminal narrowing. There is no suspicion of vascular  malformation or saccular aneurysm.    Variant anatomy: Somewhat medial course of the right cavernous carotid artery as a variant of normal.    Impression  1. Essentially negative MRA of the head.    MRI Brain W/WO 06/13/2024  FINDINGS:  Intracranial compartment: Several sequences are mildly motion degraded.     There is no acute intracranial abnormality or change in the appearance of the brain compared to the prior studies.  Brain volume, ventricular size and position are normal.  There is no hemorrhage or mass/mass effect.  There are just a few scattered punctate foci of subcortical white matter FLAIR hyperintensity within the frontal lobes.  These findings are minimal, nonspecific and may reflect sequelae of minimal chronic small vessel disease.  There are no regions of restricted diffusion to suggest acute infarction.  There is no pathologic enhancement.  The basilar cisterns are open.  There is no abnormal extra-axial fluid collection.  Flow voids indicating patency are present in the major vessels at the base of the brain.  The cerebellar tonsils are in normal position.  The orbits are grossly normal.  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  There is nonocclusive stenosis in the dural sinuses at the junction of the transverse and sigmoid sinuses, left greater than right due to arachnoid granulation.  Similar findings were present on brain MRV.  There is no suspected dural sinus thrombosis.  There is a slightly high riding right jugular bulb     Skull/extracranial contents: Marrow signal intensity in the clivus and calvarium is grossly normal.  The included paranasal sinuses and mastoid air cells are clear.  The included facial soft tissues and scalp are normal.     Impression:     1.  There is no acute abnormality or change compared to the prior studies.  There is no hemorrhage, mass/mass effect, acute infarction.  There is no pathologic enhancement.     2.  There is a  mildly expanded sella with pituitary tissue in the floor of the sella.  There is nonocclusive stenosis of the dural sinuses, left greater than right.  There is a slightly high riding right jugular bulb.    Audiogram Summary:      All lab results and imaging results have been reviewed.    Assessment:        ICD-10-CM ICD-9-CM   1. Dysfunction of left eustachian tube  H69.92 381.81   2. History of hearing loss  Z86.69 V12.49   3. Dizziness  R42 780.4           Plan:     Dizziness resolved. Pt has had issues with mucous in her throat and globus sensation since covid19. She is not having issues with acid reflux. Pt provided with throat clearing handout via AVS. Pt has had No left IFRAH today in office. Suspect post-viral ETD of left ear. Start flonase 1 spray to each nostril twice daily and astelin 1 spray to each nostril twice daily for left ETD. Follow up in 1 month if still having left ear issues and we will monitor her hearing loss with annual audiograms and annual right T-tube check.

## 2024-08-14 ENCOUNTER — LAB VISIT (OUTPATIENT)
Dept: LAB | Facility: HOSPITAL | Age: 39
End: 2024-08-14
Attending: NURSE PRACTITIONER
Payer: COMMERCIAL

## 2024-08-14 ENCOUNTER — HOSPITAL ENCOUNTER (OUTPATIENT)
Dept: RADIOLOGY | Facility: HOSPITAL | Age: 39
Discharge: HOME | End: 2024-08-14
Attending: NURSE PRACTITIONER
Payer: COMMERCIAL

## 2024-08-14 DIAGNOSIS — R53.83 FATIGUE, UNSPECIFIED TYPE: ICD-10-CM

## 2024-08-14 DIAGNOSIS — M25.562 ACUTE PAIN OF LEFT KNEE: ICD-10-CM

## 2024-08-14 DIAGNOSIS — E78.5 HYPERLIPIDEMIA, UNSPECIFIED HYPERLIPIDEMIA TYPE: ICD-10-CM

## 2024-08-14 LAB
ALBUMIN SERPL BCP-MCNC: 4 G/DL (ref 3.5–5.2)
ALP SERPL-CCNC: 58 U/L (ref 55–135)
ALT SERPL W/O P-5'-P-CCNC: 11 U/L (ref 10–44)
ANION GAP SERPL CALC-SCNC: 9 MMOL/L (ref 8–16)
AST SERPL-CCNC: 15 U/L (ref 10–40)
BASOPHILS # BLD AUTO: 0.03 K/UL (ref 0–0.2)
BASOPHILS NFR BLD: 0.6 % (ref 0–1.9)
BILIRUB SERPL-MCNC: 0.6 MG/DL (ref 0.1–1)
BUN SERPL-MCNC: 7 MG/DL (ref 6–20)
CALCIUM SERPL-MCNC: 9.3 MG/DL (ref 8.7–10.5)
CHLORIDE SERPL-SCNC: 105 MMOL/L (ref 95–110)
CHOLEST SERPL-MCNC: 230 MG/DL (ref 120–199)
CHOLEST/HDLC SERPL: 5.6 {RATIO} (ref 2–5)
CO2 SERPL-SCNC: 23 MMOL/L (ref 23–29)
CREAT SERPL-MCNC: 0.8 MG/DL (ref 0.5–1.4)
DIFFERENTIAL METHOD BLD: ABNORMAL
EOSINOPHIL # BLD AUTO: 0.1 K/UL (ref 0–0.5)
EOSINOPHIL NFR BLD: 2 % (ref 0–8)
ERYTHROCYTE [DISTWIDTH] IN BLOOD BY AUTOMATED COUNT: 12.3 % (ref 11.5–14.5)
EST. GFR  (NO RACE VARIABLE): >60 ML/MIN/1.73 M^2
GLUCOSE SERPL-MCNC: 82 MG/DL (ref 70–110)
HCT VFR BLD AUTO: 38.1 % (ref 37–48.5)
HDLC SERPL-MCNC: 41 MG/DL (ref 40–75)
HDLC SERPL: 17.8 % (ref 20–50)
HGB BLD-MCNC: 11.8 G/DL (ref 12–16)
IMM GRANULOCYTES # BLD AUTO: 0.01 K/UL (ref 0–0.04)
IMM GRANULOCYTES NFR BLD AUTO: 0.2 % (ref 0–0.5)
LDLC SERPL CALC-MCNC: 161.2 MG/DL (ref 63–159)
LYMPHOCYTES # BLD AUTO: 1.6 K/UL (ref 1–4.8)
LYMPHOCYTES NFR BLD: 32.8 % (ref 18–48)
MCH RBC QN AUTO: 27.5 PG (ref 27–31)
MCHC RBC AUTO-ENTMCNC: 31 G/DL (ref 32–36)
MCV RBC AUTO: 89 FL (ref 82–98)
MONOCYTES # BLD AUTO: 0.4 K/UL (ref 0.3–1)
MONOCYTES NFR BLD: 7 % (ref 4–15)
NEUTROPHILS # BLD AUTO: 2.9 K/UL (ref 1.8–7.7)
NEUTROPHILS NFR BLD: 57.4 % (ref 38–73)
NONHDLC SERPL-MCNC: 189 MG/DL
NRBC BLD-RTO: 0 /100 WBC
PLATELET # BLD AUTO: 275 K/UL (ref 150–450)
PMV BLD AUTO: 10.4 FL (ref 9.2–12.9)
POTASSIUM SERPL-SCNC: 4.1 MMOL/L (ref 3.5–5.1)
PROT SERPL-MCNC: 7.5 G/DL (ref 6–8.4)
RBC # BLD AUTO: 4.29 M/UL (ref 4–5.4)
SODIUM SERPL-SCNC: 137 MMOL/L (ref 136–145)
TRIGL SERPL-MCNC: 139 MG/DL (ref 30–150)
TSH SERPL DL<=0.005 MIU/L-ACNC: 0.99 UIU/ML (ref 0.4–4)
WBC # BLD AUTO: 5 K/UL (ref 3.9–12.7)

## 2024-08-14 PROCEDURE — 84443 ASSAY THYROID STIM HORMONE: CPT | Performed by: NURSE PRACTITIONER

## 2024-08-14 PROCEDURE — 80053 COMPREHEN METABOLIC PANEL: CPT | Performed by: NURSE PRACTITIONER

## 2024-08-14 PROCEDURE — 85025 COMPLETE CBC W/AUTO DIFF WBC: CPT | Performed by: NURSE PRACTITIONER

## 2024-08-14 PROCEDURE — 80061 LIPID PANEL: CPT | Performed by: NURSE PRACTITIONER

## 2024-08-14 PROCEDURE — 73560 X-RAY EXAM OF KNEE 1 OR 2: CPT | Mod: 26,LT,, | Performed by: RADIOLOGY

## 2024-08-14 PROCEDURE — 73560 X-RAY EXAM OF KNEE 1 OR 2: CPT | Mod: TC,LT

## 2024-08-14 PROCEDURE — 36415 COLL VENOUS BLD VENIPUNCTURE: CPT | Performed by: NURSE PRACTITIONER

## 2024-08-20 ENCOUNTER — DOCUMENTATION ONLY (OUTPATIENT)
Dept: REHABILITATION | Facility: HOSPITAL | Age: 39
End: 2024-08-20
Payer: COMMERCIAL

## 2024-08-20 NOTE — PROGRESS NOTES
[facility]  Physical Therapy Discharge Note    Name: Amara Ries  Clinic Number: [id number]    Therapy Diagnosis: No diagnosis found.  Physician: No ref. provider found    Physician Orders: physical therapy evaluation and treat; vestibular rehab therapy   Medical Diagnosis from Referral: dizziness and giddiness  Evaluation Date: 6/3/2024    Date of Last visit: 06/25/2024  Total Visits Received: 3      ASSESSMENT      Discharge reason: Patient has not attended therapy since 06/25/2024.    Goals:     Short Term Goals (3 Weeks):   Maintain patient's complaints of dizziness to less than 5/10 during performance of activities of daily living for independence of self care activities. (MET)  Patient to tolerate x 45 seconds full Romberg stance, eyes closed to improve upright tolerance. (PART MET)  Patient to begin adaptation home exercise program. (MET)     Long Term Goals (6 Weeks):   Patient to demonstrate competence with home exercise program to maintain therapeutic gains. (PART MET)  2.   Patient to ambulate 20 feet in less than 7 seconds to improve rad/symmetry. (NOT MET)  3.   Patient to perform floor ladder high stepping without loss of balance. (NOT MET)      PLAN     This patient is discharged from Physical Therapy.      Beau Will, PT

## 2024-08-21 ENCOUNTER — HOSPITAL ENCOUNTER (OUTPATIENT)
Dept: RADIOLOGY | Facility: HOSPITAL | Age: 39
Discharge: HOME | End: 2024-08-21
Attending: NURSE PRACTITIONER
Payer: COMMERCIAL

## 2024-08-21 DIAGNOSIS — G44.209 ACUTE NON INTRACTABLE TENSION-TYPE HEADACHE: ICD-10-CM

## 2024-08-21 PROCEDURE — 70450 CT HEAD/BRAIN W/O DYE: CPT | Mod: TC

## 2024-08-21 PROCEDURE — 70450 CT HEAD/BRAIN W/O DYE: CPT | Mod: 26,,, | Performed by: RADIOLOGY

## 2024-09-05 ENCOUNTER — PATIENT MESSAGE (OUTPATIENT)
Dept: FAMILY MEDICINE | Facility: CLINIC | Age: 39
End: 2024-09-05
Payer: COMMERCIAL

## 2024-09-05 DIAGNOSIS — E66.09 CLASS 1 OBESITY DUE TO EXCESS CALORIES WITHOUT SERIOUS COMORBIDITY WITH BODY MASS INDEX (BMI) OF 30.0 TO 30.9 IN ADULT: Primary | ICD-10-CM

## 2024-09-05 RX ORDER — SEMAGLUTIDE 1.7 MG/.75ML
1.7 INJECTION, SOLUTION SUBCUTANEOUS
Qty: 3 ML | Refills: 2 | Status: SHIPPED | OUTPATIENT
Start: 2024-09-05 | End: 2024-12-04

## 2024-09-27 ENCOUNTER — OFFICE VISIT (OUTPATIENT)
Dept: NEUROLOGY | Facility: CLINIC | Age: 39
End: 2024-09-27
Payer: COMMERCIAL

## 2024-09-27 VITALS
SYSTOLIC BLOOD PRESSURE: 128 MMHG | HEART RATE: 89 BPM | BODY MASS INDEX: 28.9 KG/M2 | DIASTOLIC BLOOD PRESSURE: 77 MMHG | HEIGHT: 60 IN | WEIGHT: 147.19 LBS

## 2024-09-27 DIAGNOSIS — H93.A9 PULSATILE TINNITUS: ICD-10-CM

## 2024-09-27 DIAGNOSIS — R42 DIZZINESS AND GIDDINESS: ICD-10-CM

## 2024-09-27 DIAGNOSIS — E23.6 EMPTY SELLA: Primary | ICD-10-CM

## 2024-09-27 DIAGNOSIS — Z86.69 HISTORY OF MIGRAINE: ICD-10-CM

## 2024-09-27 PROCEDURE — 99999 PR PBB SHADOW E&M-EST. PATIENT-LVL V: CPT | Mod: PBBFAC,,, | Performed by: PSYCHIATRY & NEUROLOGY

## 2024-09-27 RX ORDER — ALPRAZOLAM 0.25 MG/1
0.25 TABLET ORAL 3 TIMES DAILY PRN
COMMUNITY

## 2024-09-27 NOTE — PROGRESS NOTES
Date: 9/27/2024    Patient ID: Amara Reis is a 39 y.o. female.    Referring Provider:  Luiz Parra,*    Chief Complaint: Dizziness      History of Present Illness:  Ms. Reis is a 39 y.o. female who presents referred by Luiz Parra,* today for evaluation of dizziness and MRI findings.     She went on a cruise in May 2024. She describes dizziness that started on her cruise and lingered after. She feels a spinning sensation and feels like she might fall if she is standing up. It comes and goes. Not positional and it is random when it happens. She will want to just go lay down.     She has done some vestibular rehab and this helped with the dizziness. She also noted xanax helped her dizziness in the past and meclizine did not. She takes a xanax and goes to sleep when it happens.     She has had headaches for years. She describes pounding pain bilaterally, sometimes in the front. She has pain up to 7/10 in intensity. She hears a whooshing sound like a washing machine in her ear. She also saw audiology and had hearing loss in both ears. She sometimes see colored spots in her vision when her pressure is high or she has a bad migraine. No loss of vision or black spots in the vision. She sometimes has blurry vision and has glasses but can't find them. She has seen optho last year. A previous neurologist told her she needed a LP but she never did this because she was scared of the needle.     MRI brain showed mild small vessel changes, mildly expanded sella and stenosis of the left > right transverse and sigmoid sinuses.     She has history of obesity and is on Wegovy for weight loss. Orthostatics are negative.     Allergies:  Review of patient's allergies indicates:   Allergen Reactions    Morphine Hives, Itching and Nausea And Vomiting    Flagyl [metronidazole hcl] Other (See Comments)     Oral form only--burns her   stomach. Take IV only.  Had to have surgery to fix this       Current  Medications:  Current Outpatient Medications   Medication Sig Dispense Refill    ALPRAZolam (XANAX) 0.25 MG tablet Take 0.25 mg by mouth 3 (three) times daily as needed for Anxiety (helped with dizziness).      azelastine (ASTELIN) 137 mcg (0.1 %) nasal spray 1 spray (137 mcg total) by Nasal route 2 (two) times daily. Point up and slightly outward toward ear when spraying to avoid irritating nasal septum. 90 mL 0    ergocalciferol (VITAMIN D2) 50,000 unit Cap       fluticasone propionate (FLONASE) 50 mcg/actuation nasal spray 1 spray (50 mcg total) by Each Nostril route 2 (two) times daily. Point up and slightly outward toward ear when spraying to avoid irritating nasal septum. 48 g 0    multivit 47-iron-folate 1-dha (WESCAP-PN DHA) 27 mg iron-1 mg -300 mg Cap Take 1 tablet by mouth once daily.      tranexamic acid (LYSTEDA) 650 mg tablet       WEGOVY 1.7 mg/0.75 mL PnIj Inject 1.7 mg into the skin every 7 days. 3 mL 2     No current facility-administered medications for this visit.       Past Medical History:  Past Medical History:   Diagnosis Date    Anemia     Clostridium difficile colitis     hx--reoccurs with antibiotic use    Cystitis 05/20/2014    Endometriosis     Glucose intolerance     Hypertrophy of nasal turbinates 02/18/2019    Intramural leiomyoma of uterus 12/01/2016    Ovarian cyst     Ovarian cyst 05/20/2014    Pelvic pain in female     Sleep apnea, unspecified     Thyroid disease     Thyroid nodule    Urinary tract infection, site not specified        Past Surgical History:  Past Surgical History:   Procedure Laterality Date    DILATION AND CURETTAGE OF UTERUS      Due to miscarriage.    EXCISION OF LESION OF NOSE Bilateral 02/18/2019    Procedure: EXCISION, LESION, NOSE;  Surgeon: Chadd Ward MD;  Location: [facility];  Service: ENT;  Laterality: Bilateral;    LASER LAPAROSCOPY  07/01/2013    pt has had 3 surgies     MYOMECTOMY  2013    Lasered endometriosis.    MYRINGOTOMY WITH INSERTION OF  VENTILATION TUBE Right 02/18/2019    Procedure: MYRINGOTOMY, WITH TYMPANOSTOMY TUBE INSERTION;  Surgeon: Chadd Ward MD;  Location: [facility];  Service: ENT;  Laterality: Right;    [place] EXPLORATORY OF ABDOMEN  2012    [place] EXPLORATORY OF ABDOMEN  04/15/2014    VAGINAL DELIVERY      x2 normal       Family History:  family history includes Fibromyalgia in her mother; Hypertension in her father and mother; Miscarriages / Stillbirths in her mother; Thyroid disease in her brother.    Social History:   reports that she has never smoked. She has never been exposed to tobacco smoke. She has never used smokeless tobacco. She reports that she does not drink alcohol and does not use drugs.    Physical Exam:  Vitals:    09/27/24 0811 09/27/24 0814 09/27/24 0815 09/27/24 0817   BP: 125/73 122/70 126/77 128/77   Pulse: 88 80 82 89   Weight: 66.7 kg (147 lb 2.5 oz)      Height: 5' (1.524 m)      PainSc: 0-No pain        Body mass index is 28.74 kg/m².    Neurological Exam:  Mental status: Awake, alert.  Speech/Language: No dysarthria or aphasia on conversation.   Cranial nerves: Pupils equal round and reactive to light, extraocular movements intact, facial strength and sensation intact bilaterally, tongue midline, hearing grossly intact bilaterally. Shoulder shrug normal bilaterally.   Motor: 5 out of 5 strength throughout the upper and lower extremities bilaterally. Normal bulk and tone.   Sensation: Intact to light touch and vibration bilaterally.  DTR: 2+ at the knees and biceps bilaterally.  Coordination: Finger-nose-finger testing and rapid alternating movements normal bilaterally. No tremor.     Data:  I have personally reviewed the referring provider's notes, labs, & imaging made available to me today.     Labs:  CBC:   Lab Results   Component Value Date    WBC 5.00 08/14/2024    HGB 11.8 (L) 08/14/2024    HCT 38.1 08/14/2024     08/14/2024    MCV 89 08/14/2024    RDW 12.3 08/14/2024     BMP:   Lab Results   Component  "Value Date     08/14/2024    K 4.1 08/14/2024     08/14/2024    CO2 23 08/14/2024    BUN 7 08/14/2024    CREATININE 0.8 08/14/2024    GLU 82 08/14/2024    CALCIUM 9.3 08/14/2024    MG 2.4 09/28/2022     LFTS;   Lab Results   Component Value Date    PROT 7.5 08/14/2024    ALBUMIN 4.0 08/14/2024    BILITOT 0.6 08/14/2024    AST 15 08/14/2024    ALKPHOS 58 08/14/2024    ALT 11 08/14/2024     COAGS: No results found for: "INR", "PROTIME", "PTT"  FLP:   Lab Results   Component Value Date    CHOL 230 (H) 08/14/2024    HDL 41 08/14/2024    LDLCALC 161.2 (H) 08/14/2024    TRIG 139 08/14/2024    CHOLHDL 17.8 (L) 08/14/2024       Imaging:  I have personally reviewed the imaging, MRI brain with expanded sella changes and sinus stenosis.     Assessment and Plan:  Ms. Reis is a 39 y.o. female referred to me by Luiz Parra,* for evaluation of dizziness. Her dizziness sounds like it could be mal de debarquement syndrome. Discussed that vestibular therapy is important for treatment of this. Sometimes benzos are helpful and this has been prescribed by her PCP. I advised she try to limit this tough. PCP could also try SSRI to see if this may help as well.     I note though that she has sinus stenosis and sella changes on her MRI. She has whooshing in her ears and headaches. I will refer to [facility] for possible IIH and treatment. Perhaps the dizziness is partially related to this as well. If this does not improve her symptoms or they do not feel she has IIH, could consider vascular consult to see if jugular bulb or dural sinus stenosis is a primary etiology.     Empty sella  -     Ambulatory consult to Neurology; Future; Expected date: 10/04/2024    Dizziness and giddiness  -     Ambulatory referral/consult to Neurology    History of migraine  -     Ambulatory referral/consult to Neurology  -     Ambulatory consult to Neurology; Future; Expected date: 10/04/2024    Pulsatile tinnitus       I spent a total " of 60 minutes on the day of the visit.This includes face to face time and non-face to face time preparing to see the patient (eg, review of tests), Obtaining and/or reviewing separately obtained history, Documenting clinical information in the electronic or other health record, Independently interpreting results and communicating results to the patient/family/caregiver, or Care coordination.

## 2024-09-30 RX ORDER — CHLORHEXIDINE GLUCONATE ORAL RINSE 1.2 MG/ML
SOLUTION DENTAL
COMMUNITY
Start: 2024-09-26

## 2024-10-01 ENCOUNTER — OFFICE VISIT (OUTPATIENT)
Dept: FAMILY MEDICINE | Facility: CLINIC | Age: 39
End: 2024-10-01
Payer: COMMERCIAL

## 2024-10-01 VITALS
HEIGHT: 60 IN | TEMPERATURE: 100 F | BODY MASS INDEX: 28.7 KG/M2 | SYSTOLIC BLOOD PRESSURE: 120 MMHG | DIASTOLIC BLOOD PRESSURE: 88 MMHG | HEART RATE: 80 BPM | RESPIRATION RATE: 20 BRPM | WEIGHT: 146.19 LBS

## 2024-10-01 DIAGNOSIS — M25.562 CHRONIC PAIN OF LEFT KNEE: Primary | ICD-10-CM

## 2024-10-01 DIAGNOSIS — R42 DIZZINESS: ICD-10-CM

## 2024-10-01 DIAGNOSIS — F41.9 ANXIETY: ICD-10-CM

## 2024-10-01 DIAGNOSIS — G89.29 CHRONIC PAIN OF LEFT KNEE: Primary | ICD-10-CM

## 2024-10-01 DIAGNOSIS — E56.9 VITAMIN DEFICIENCY: ICD-10-CM

## 2024-10-01 DIAGNOSIS — E55.9 VITAMIN D DEFICIENCY: ICD-10-CM

## 2024-10-01 PROCEDURE — 3044F HG A1C LEVEL LT 7.0%: CPT | Mod: CPTII,S$GLB,, | Performed by: NURSE PRACTITIONER

## 2024-10-01 PROCEDURE — 3079F DIAST BP 80-89 MM HG: CPT | Mod: CPTII,S$GLB,, | Performed by: NURSE PRACTITIONER

## 2024-10-01 PROCEDURE — 3074F SYST BP LT 130 MM HG: CPT | Mod: CPTII,S$GLB,, | Performed by: NURSE PRACTITIONER

## 2024-10-01 PROCEDURE — 99214 OFFICE O/P EST MOD 30 MIN: CPT | Mod: S$GLB,,, | Performed by: NURSE PRACTITIONER

## 2024-10-01 PROCEDURE — 3008F BODY MASS INDEX DOCD: CPT | Mod: CPTII,S$GLB,, | Performed by: NURSE PRACTITIONER

## 2024-10-01 PROCEDURE — 1159F MED LIST DOCD IN RCRD: CPT | Mod: CPTII,S$GLB,, | Performed by: NURSE PRACTITIONER

## 2024-10-01 PROCEDURE — 99999 PR PBB SHADOW E&M-EST. PATIENT-LVL IV: CPT | Mod: PBBFAC,,, | Performed by: NURSE PRACTITIONER

## 2024-10-01 RX ORDER — ALPRAZOLAM 0.25 MG/1
0.25 TABLET ORAL 3 TIMES DAILY PRN
Qty: 30 TABLET | Refills: 0 | Status: SHIPPED | OUTPATIENT
Start: 2024-10-01

## 2024-10-01 RX ORDER — ERGOCALCIFEROL 1.25 MG/1
50000 CAPSULE ORAL
Qty: 4 CAPSULE | Refills: 5 | Status: SHIPPED | OUTPATIENT
Start: 2024-10-01 | End: 2025-03-30

## 2024-10-01 RX ORDER — MULTIVIT 47/IRON/FOLATE 1/DHA 27-1-300MG
1 CAPSULE ORAL DAILY
Qty: 30 CAPSULE | Refills: 5 | Status: SHIPPED | OUTPATIENT
Start: 2024-10-01 | End: 2025-03-30

## 2024-10-01 NOTE — PROGRESS NOTES
Patient ID: Amara Reis is a 39 y.o. female.    Chief Complaint: Follow-up (38 yo female here consult about hair loss and knee pain. KM)    Mrs. Maradiaga presents today for evaluation of several symptoms. She has been having knee pain for several months. She had basic imaging that did not show any structural abnormality. She has not utilized physical therapy in the past so an order will be placed for this at this time.     She also states she has been under a great deal of stress lately and it has caused her to have some hair loss. While there is no hair loss pattern I have encouraged the use of vitamins to help promote hair growth. Labs have not shown any significant abnormalities.     Knee Pain   The incident occurred more than 1 week ago. There was no injury mechanism. The pain is present in the left thigh, left hip and left knee. The quality of the pain is described as aching. The pain is at a severity of 7/10. The pain is moderate. The pain has been Fluctuating since onset. Associated symptoms include muscle weakness and numbness. She reports no foreign bodies present. The symptoms are aggravated by movement and weight bearing. She has tried acetaminophen, heat, immobilization and NSAIDs for the symptoms. The treatment provided mild relief.           Past Medical History:   Diagnosis Date    Anemia     Clostridium difficile colitis     hx--reoccurs with antibiotic use    Cystitis 05/20/2014    Endometriosis     Glucose intolerance     Hypertrophy of nasal turbinates 02/18/2019    Intramural leiomyoma of uterus 12/01/2016    Ovarian cyst     Ovarian cyst 05/20/2014    Pelvic pain in female     Sleep apnea, unspecified     Thyroid disease     Thyroid nodule    Urinary tract infection, site not specified      Past Surgical History:   Procedure Laterality Date    DILATION AND CURETTAGE OF UTERUS      Due to miscarriage.    EXCISION OF LESION OF NOSE Bilateral 02/18/2019    Procedure: EXCISION, LESION, NOSE;   Surgeon: Chadd Ward MD;  Location: [facility] OR;  Service: ENT;  Laterality: Bilateral;    LASER LAPAROSCOPY  07/01/2013    pt has had 3 surgies     MYOMECTOMY  2013    Lasered endometriosis.    MYRINGOTOMY WITH INSERTION OF VENTILATION TUBE Right 02/18/2019    Procedure: MYRINGOTOMY, WITH TYMPANOSTOMY TUBE INSERTION;  Surgeon: Chadd Ward MD;  Location: [facility] OR;  Service: ENT;  Laterality: Right;    [place] EXPLORATORY OF ABDOMEN  2012    [place] EXPLORATORY OF ABDOMEN  04/15/2014    VAGINAL DELIVERY      x2 normal         Tobacco History:  reports that she has never smoked. She has never been exposed to tobacco smoke. She has never used smokeless tobacco.      Review of patient's allergies indicates:   Allergen Reactions    Morphine Hives, Itching and Nausea And Vomiting    Flagyl [metronidazole hcl] Other (See Comments)     Oral form only--burns her   stomach. Take IV only.  Had to have surgery to fix this       Current Outpatient Medications:     azelastine (ASTELIN) 137 mcg (0.1 %) nasal spray, 1 spray (137 mcg total) by Nasal route 2 (two) times daily. Point up and slightly outward toward ear when spraying to avoid irritating nasal septum., Disp: 90 mL, Rfl: 0    chlorhexidine (PERIDEX) 0.12 % solution, SMARTSIG:By Mouth, Disp: , Rfl:     fluticasone propionate (FLONASE) 50 mcg/actuation nasal spray, 1 spray (50 mcg total) by Each Nostril route 2 (two) times daily. Point up and slightly outward toward ear when spraying to avoid irritating nasal septum., Disp: 48 g, Rfl: 0    tranexamic acid (LYSTEDA) 650 mg tablet, , Disp: , Rfl:     WEGOVY 1.7 mg/0.75 mL PnIj, Inject 1.7 mg into the skin every 7 days., Disp: 3 mL, Rfl: 2    ALPRAZolam (XANAX) 0.25 MG tablet, Take 1 tablet (0.25 mg total) by mouth 3 (three) times daily as needed for Anxiety (dizziness)., Disp: 30 tablet, Rfl: 0    ergocalciferol (VITAMIN D2) 50,000 unit Cap, Take 1 capsule (50,000 Units total) by mouth every 7 days., Disp: 4 capsule, Rfl: 5     multivit 47-iron-folate 1-dha (WESCAP-PN DHA) 27 mg iron-1 mg -300 mg Cap, Take 1 tablet by mouth once daily., Disp: 30 capsule, Rfl: 5    Review of Systems   Constitutional:  Positive for activity change. Negative for unexpected weight change.   HENT:  Negative for hearing loss, rhinorrhea and trouble swallowing.    Eyes:  Negative for discharge and visual disturbance.   Respiratory:  Negative for chest tightness and wheezing.    Cardiovascular:  Negative for chest pain and palpitations.   Gastrointestinal:  Negative for blood in stool, constipation, diarrhea and vomiting.   Endocrine: Negative for polydipsia and polyuria.   Genitourinary:  Negative for difficulty urinating, dysuria, hematuria and menstrual problem.   Musculoskeletal:  Positive for arthralgias and joint swelling. Negative for neck pain.   Neurological:  Positive for numbness. Negative for weakness and headaches.   Psychiatric/Behavioral:  Negative for confusion and dysphoric mood.           Objective:      Vitals:    10/01/24 1326   BP: 120/88   Pulse: 80   Resp: 20   Temp: 99.5 °F (37.5 °C)   TempSrc: Oral   Weight: 66.3 kg (146 lb 3.2 oz)   Height: 5' (1.524 m)     Physical Exam  Constitutional:       Appearance: Normal appearance.   Cardiovascular:      Rate and Rhythm: Normal rate and regular rhythm.      Heart sounds: Normal heart sounds.   Pulmonary:      Effort: Pulmonary effort is normal.      Breath sounds: Normal breath sounds.   Musculoskeletal:         General: Normal range of motion.      Left knee: Swelling (intermittent swelling. No swelling in clinic) present.   Skin:     General: Skin is warm.   Neurological:      Mental Status: She is alert and oriented to person, place, and time. Mental status is at baseline.   Psychiatric:         Mood and Affect: Mood normal.         Behavior: Behavior normal.           Assessment:       1. Chronic pain of left knee    2. Vitamin D deficiency    3. Vitamin deficiency    4. Dizziness    5.  Anxiety           Plan:       Chronic pain of left knee  -     Ambulatory referral/consult to Physical/Occupational Therapy; Future; Expected date: 10/08/2024    Vitamin D deficiency  -     ergocalciferol (VITAMIN D2) 50,000 unit Cap; Take 1 capsule (50,000 Units total) by mouth every 7 days.  Dispense: 4 capsule; Refill: 5    Vitamin deficiency  -     multivit 47-iron-folate 1-dha (WESCAP-PN DHA) 27 mg iron-1 mg -300 mg Cap; Take 1 tablet by mouth once daily.  Dispense: 30 capsule; Refill: 5    Dizziness  -     ALPRAZolam (XANAX) 0.25 MG tablet; Take 1 tablet (0.25 mg total) by mouth 3 (three) times daily as needed for Anxiety (dizziness).  Dispense: 30 tablet; Refill: 0    Anxiety  -     ALPRAZolam (XANAX) 0.25 MG tablet; Take 1 tablet (0.25 mg total) by mouth 3 (three) times daily as needed for Anxiety (dizziness).  Dispense: 30 tablet; Refill: 0      Follow up in about 3 months (around 1/1/2025), or if symptoms worsen or fail to improve, for Dizziness/Anxiety .        10/1/2024 Sejal Guerrero NP    Spent johnnie 30 minutes with patient which involved review of pts medical conditions, labs, medications and with 50% of time face-to-face discussion about medical problems, management and any applicable changes.

## 2024-10-03 ENCOUNTER — OFFICE VISIT (OUTPATIENT)
Dept: NEUROLOGY | Facility: CLINIC | Age: 39
End: 2024-10-03
Payer: COMMERCIAL

## 2024-10-03 VITALS
BODY MASS INDEX: 28.53 KG/M2 | RESPIRATION RATE: 17 BRPM | DIASTOLIC BLOOD PRESSURE: 92 MMHG | HEIGHT: 60 IN | HEART RATE: 84 BPM | TEMPERATURE: 98 F | SYSTOLIC BLOOD PRESSURE: 133 MMHG | WEIGHT: 145.31 LBS

## 2024-10-03 DIAGNOSIS — E23.6 EMPTY SELLA: ICD-10-CM

## 2024-10-03 DIAGNOSIS — G47.33 OSA ON CPAP: ICD-10-CM

## 2024-10-03 DIAGNOSIS — H93.A9 PULSATILE TINNITUS: ICD-10-CM

## 2024-10-03 DIAGNOSIS — G43.719 INTRACTABLE CHRONIC MIGRAINE WITHOUT AURA AND WITHOUT STATUS MIGRAINOSUS: Primary | ICD-10-CM

## 2024-10-03 DIAGNOSIS — Z86.69 HISTORY OF MIGRAINE: ICD-10-CM

## 2024-10-03 PROBLEM — Z86.73 HISTORY OF STROKE: Status: RESOLVED | Noted: 2024-06-03 | Resolved: 2024-10-03

## 2024-10-03 PROCEDURE — 1160F RVW MEDS BY RX/DR IN RCRD: CPT | Mod: CPTII,S$GLB,, | Performed by: NURSE PRACTITIONER

## 2024-10-03 PROCEDURE — 1159F MED LIST DOCD IN RCRD: CPT | Mod: CPTII,S$GLB,, | Performed by: NURSE PRACTITIONER

## 2024-10-03 PROCEDURE — 99999 PR PBB SHADOW E&M-EST. PATIENT-LVL V: CPT | Mod: PBBFAC,,, | Performed by: NURSE PRACTITIONER

## 2024-10-03 PROCEDURE — 3044F HG A1C LEVEL LT 7.0%: CPT | Mod: CPTII,S$GLB,, | Performed by: NURSE PRACTITIONER

## 2024-10-03 PROCEDURE — 3008F BODY MASS INDEX DOCD: CPT | Mod: CPTII,S$GLB,, | Performed by: NURSE PRACTITIONER

## 2024-10-03 PROCEDURE — 99417 PROLNG OP E/M EACH 15 MIN: CPT | Mod: S$GLB,,, | Performed by: NURSE PRACTITIONER

## 2024-10-03 PROCEDURE — 99215 OFFICE O/P EST HI 40 MIN: CPT | Mod: S$GLB,,, | Performed by: NURSE PRACTITIONER

## 2024-10-03 PROCEDURE — 3075F SYST BP GE 130 - 139MM HG: CPT | Mod: CPTII,S$GLB,, | Performed by: NURSE PRACTITIONER

## 2024-10-03 PROCEDURE — 3080F DIAST BP >= 90 MM HG: CPT | Mod: CPTII,S$GLB,, | Performed by: NURSE PRACTITIONER

## 2024-10-03 RX ORDER — LANOLIN ALCOHOL/MO/W.PET/CERES
400 CREAM (GRAM) TOPICAL 2 TIMES DAILY
Qty: 60 TABLET | Refills: 12 | Status: SHIPPED | OUTPATIENT
Start: 2024-10-03 | End: 2024-10-03 | Stop reason: SDUPTHER

## 2024-10-03 RX ORDER — LANOLIN ALCOHOL/MO/W.PET/CERES
400 CREAM (GRAM) TOPICAL 2 TIMES DAILY
Qty: 60 TABLET | Refills: 12 | Status: SHIPPED | OUTPATIENT
Start: 2024-10-03

## 2024-10-03 RX ORDER — RIZATRIPTAN BENZOATE 10 MG/1
TABLET ORAL
Qty: 9 TABLET | Refills: 11 | Status: SHIPPED | OUTPATIENT
Start: 2024-10-03

## 2024-10-03 RX ORDER — FREMANEZUMAB-VFRM 225 MG/1.5ML
225 INJECTION SUBCUTANEOUS
Qty: 1 EACH | Refills: 11 | Status: SHIPPED | OUTPATIENT
Start: 2024-10-03

## 2024-10-03 NOTE — PROGRESS NOTES
Date of service: 10/3/2024  Referring provider: Dr. Demi Harrington    Subjective:      Chief complaint: Headache       Patient ID: Amara Reis is a 39 y.o. female with history of stroke, EDISON, HTN, vertigo who presents for new patient evaluation of headache and IIH    She saw Dr. Harrington one week ago for dizziness    History of Present Illness    ORIGINAL HEADACHE HISTORY - 10/3/24  Age at onset and course over time: years ago    Aura - spots in vision, she typically goes to sleep    Family history of headaches - mom and dad with migraines  Last eye exam - March 2023. She states she is supposed to wear glasses.    Regarding possible IIH - neurology told her she had a form of hydrocephalus due to findings on an MRI. An LP was recommended but she declined due to needle phobia. At that time, she was having migraines similar to now. This was around 2015? Weight then was about what it is now. No antibiotic use, no high dose vitamin A, she was on non-estrogen birth control.  She did see ophthalmology at that time and again March of 2023 and has had negative papilledema both times.    Regarding history of stroke she states she was told to follow up with neurology in 2021 due to stroke on imaging. Cannot see this imaging and CT in 2021 does not indicate history of CVA.    Location: front, sides, back  Quality:  [x] pressure [x] tight [x] throbbing [] sharp [] stabbing   Severity: current 0 with range 5-10  Duration: hours, days  Frequency: 5 days per week  Headaches awaken at night?: yes    Worst time of day: varies   Associated with: [x] photophobia []  phonophobia [] osmophobia [x] blurred vision  [] double vision [] loss of appetite [x] nausea [x] vomiting [x] dizziness [] vertigo  [x] tinnitus [x] irritability [] sinus pressure [] problems with concentration   [x] neck tightness   Alleviated by:  [x] sleep [] darkness [] massage [] heat [] ice [x] medication  Exacerbated by:  [] fatigue [x] light [] noise [] smells []  coughing [] sneezing  [] bending over [] ovulation [] menses [] alcohol [] change in weather [x]  stress  Ipsilateral autonomic: [] nasal congestion [] lacrimation [] ptosis [] injection [] edema [] foreign body sensation [] ear fullness   ICP:  [] transient visual obscurations  [x] tinnitus high pitched, pulsatile, bilateral   [] positional headache  [x] non-positional   Sleep habits: trouble falling asleep, snoring, sleep apnea - does not use CPAP  Caffeine intake:   Gyn status (if female): having periods, no contraception - not preventing pregnancy   HIT 6: 68    Current acute treatment:  Xanax    Current prevention:  (Wegovy)    Previously tried/failed acute treatment:  Tylenol  Toradol  Aleve  Fioricet  Excedrin  Flexeril - sedating   Tizanidine - caused hallucinations     Previously tried/failed preventative treatment:  Labetalol  Amlodipine  Topamax  Gabapentin - sedating     Review of patient's allergies indicates:   Allergen Reactions    Morphine Hives, Itching and Nausea And Vomiting    Flagyl [metronidazole hcl] Other (See Comments)     Oral form only--burns her   stomach. Take IV only.  Had to have surgery to fix this     Current Outpatient Medications   Medication Sig Dispense Refill    ALPRAZolam (XANAX) 0.25 MG tablet Take 1 tablet (0.25 mg total) by mouth 3 (three) times daily as needed for Anxiety (dizziness). 30 tablet 0    azelastine (ASTELIN) 137 mcg (0.1 %) nasal spray 1 spray (137 mcg total) by Nasal route 2 (two) times daily. Point up and slightly outward toward ear when spraying to avoid irritating nasal septum. 90 mL 0    chlorhexidine (PERIDEX) 0.12 % solution SMARTSIG:By Mouth      ergocalciferol (VITAMIN D2) 50,000 unit Cap Take 1 capsule (50,000 Units total) by mouth every 7 days. 4 capsule 5    fluticasone propionate (FLONASE) 50 mcg/actuation nasal spray 1 spray (50 mcg total) by Each Nostril route 2 (two) times daily. Point up and slightly outward toward ear when spraying to avoid  irritating nasal septum. 48 g 0    multivit 47-iron-folate 1-dha (WESCAP-PN DHA) 27 mg iron-1 mg -300 mg Cap Take 1 tablet by mouth once daily. 30 capsule 5    tranexamic acid (LYSTEDA) 650 mg tablet       WEGOVY 1.7 mg/0.75 mL PnIj Inject 1.7 mg into the skin every 7 days. 3 mL 2    fremanezumab-vfrm (AJOVY AUTOINJECTOR) 225 mg/1.5 mL autoinjector Inject 1.5 mLs (225 mg total) into the skin every 28 days. 1 each 11    magnesium oxide (MAG-OX) 400 mg (241.3 mg magnesium) tablet Take 1 tablet (400 mg total) by mouth 2 (two) times daily. 60 tablet 12    rizatriptan (MAXALT) 10 MG tablet 1 tab PO PRN migraine. May repeat every 2 hours for max 3 tabs in 24 hours. Use no more than 10 days per month. 9 tablet 11     No current facility-administered medications for this visit.       Past Medical History  Past Medical History:   Diagnosis Date    Anemia     Clostridium difficile colitis     hx--reoccurs with antibiotic use    Cystitis 05/20/2014    Endometriosis     Glucose intolerance     Hypertrophy of nasal turbinates 02/18/2019    Intramural leiomyoma of uterus 12/01/2016    Ovarian cyst     Ovarian cyst 05/20/2014    Pelvic pain in female     Sleep apnea, unspecified     Thyroid disease     Thyroid nodule    Urinary tract infection, site not specified        Past Surgical History  Past Surgical History:   Procedure Laterality Date    DILATION AND CURETTAGE OF UTERUS      Due to miscarriage.    EXCISION OF LESION OF NOSE Bilateral 02/18/2019    Procedure: EXCISION, LESION, NOSE;  Surgeon: Chadd Ward MD;  Location: [facility] OR;  Service: ENT;  Laterality: Bilateral;    LASER LAPAROSCOPY  07/01/2013    pt has had 3 surgies     MYOMECTOMY  2013    Lasered endometriosis.    MYRINGOTOMY WITH INSERTION OF VENTILATION TUBE Right 02/18/2019    Procedure: MYRINGOTOMY, WITH TYMPANOSTOMY TUBE INSERTION;  Surgeon: Chadd Ward MD;  Location: [facility] OR;  Service: ENT;  Laterality: Right;    RI EXPLORATORY OF ABDOMEN  2012    RI  EXPLORATORY OF ABDOMEN  04/15/2014    VAGINAL DELIVERY      x2 normal       Family History  Family History   Problem Relation Name Age of Onset    Hypertension Mother Candy mccauley     Miscarriages / Stillbirths Mother Candy mccauley     Fibromyalgia Mother Candy mccauley     Hypertension Father Philippe mccauley     Thyroid disease Brother      Breast cancer Neg Hx      Colon cancer Neg Hx         Social History  Social History     Socioeconomic History    Marital status:    Tobacco Use    Smoking status: Never     Passive exposure: Never    Smokeless tobacco: Never   Substance and Sexual Activity    Alcohol use: No     Comment: rarely    Drug use: Never    Sexual activity: Yes     Partners: Male     Social Drivers of Health     Financial Resource Strain: Medium Risk (5/16/2024)    Overall Financial Resource Strain (CARDIA)     Difficulty of Paying Living Expenses: Somewhat hard   Food Insecurity: No Food Insecurity (5/16/2024)    Hunger Vital Sign     Worried About Running Out of Food in the Last Year: Never true     Ran Out of Food in the Last Year: Never true   Transportation Needs: No Transportation Needs (2/13/2023)    Received from [facility], [facility]    PRAPARE - Transportation     Lack of Transportation (Medical): No     Lack of Transportation (Non-Medical): No   Physical Activity: Inactive (5/16/2024)    Exercise Vital Sign     Days of Exercise per Week: 0 days     Minutes of Exercise per Session: 0 min   Stress: Stress Concern Present (5/16/2024)    [employer] - Occupational Stress Questionnaire     Feeling of Stress : Rather much   Housing Stability: Unknown (5/16/2024)    Housing Stability Vital Sign     Unable to Pay for Housing in the Last Year: No        Review of Systems  14-point review of systems as follows:   No  check saleem indicates NEGATIVE response   Constitutional: [] weight loss, [] change to appetite   Eyes: [x] change in vision, [] double vision   Ears, nose, mouth, throat: [] frequent nose bleeds, [x] ringing in the ears   Respiratory: [] cough, [] wheezing   Cardiovascular: [] chest pain, [] palpitations   Gastrointestinal: [] jaundice, [] nausea/vomiting   Genitourinary: [] incontinence, [] burning with urination   Hematologic/lymphatic: [x] easy bruising/bleeding, [] night sweats   Neurological: [] numbness, [] weakness   Endocrine: [] fatigue, [] heat/cold intolerance   Allergy/Immunologic: [] fevers, [] chills   Musculoskeletal: [] muscle pain, [] joint pain   Psychiatric: [] thoughts of harming self/others, [] depression   Integumentary: [] rashes, [] sores that do not heal     Objective:        Vitals:    10/03/24 0945   BP: (!) 133/92   Pulse: 84   Resp: 17   Temp: 97.7 °F (36.5 °C)     Body mass index is 28.37 kg/m².    10/3/24  Constitutional: appears in no acute distress, well-developed, well-nourished     Eyes: normal conjunctiva, PERRLA    Ears, nose, mouth, throat: external appearance of ears and nose normal, hearing intact. Mild tongue scalloping      Cardiovascular: regular rate and rhythm, no murmurs appreciated    Respiratory: unlabored respirations, breath sounds normal bilaterally    Gastrointestinal: no visible abdominal masses, no guarding, no visible hernia    Musculoskeletal: normal tone in all four extremities. No abnormal movements. No pronator drift. No orbit. Symmetric finger tapping. Normal station. Normal regular gait. Normal tandem gait.      Spine:   CERVICAL SPINE:  ROM: normal   MUSCLE SPASM: bilateral    FACET LOADING: no   SPURLING: no  JAMAR / ALIYA tender: no     Psychiatric: normal judgment and insight. Oriented to person, place, and time.     Neurologic:   Cortical functions: recent and remote memory intact, normal attention span and concentration, speech fluent, adequate fund of  knowledge   Cranial nerves: visual fields full, PERRLA, EOMI, symmetric facial strength, hearing intact, palate elevates symmetrically, shoulder shrug 5/5, tongue protrudes midline   Reflexes: 2+ in the upper and lower extremities, no Lopez  Sensation: intact to temperature throughout   Coordination: normal finger to nose, heel to shin     Data Review:     I have personally reviewed the referring provider's notes, labs, & imaging made available to me today.      RADIOLOGY STUDIES:  I have personally reviewed the pertinent images performed.       Results for orders placed or performed during the hospital encounter of 08/21/24   CT Head Without Contrast    Narrative    EXAMINATION:  CT HEAD WITHOUT CONTRAST    CLINICAL HISTORY:  Headache, sudden, severe; Tension-type headache, unspecified, not intractable    TECHNIQUE:  Low dose axial CT images obtained throughout the head without intravenous contrast. Sagittal and coronal reconstructions were performed.    COMPARISON:  Brain MRI 06/13/2024.    FINDINGS:  Brain: There is no evidence of a mass, edema, midline shift, or intracranial hemorrhage. No extra-axial fluid collection. No CT evidence of an acute major vascular territorial infarct.  Mildly expanded partially empty sella configuration redemonstrated better assessed on recent MRI 06/13/2024.    Ventricles: The ventricles, sulci, and cisterns are within normal limits.    Skull: The osseous structures are unremarkable in appearance.    Extracranial soft tissues: Limited imaging is within normal limits.    Other: Small retention cysts within the right maxillary sinus and mild bilateral maxillary sinus mucosal thickening.  Mastoid air cells are clear.  Left-sided nasal piercing is noted.  No acute orbital findings.      Impression    1. Essentially negative noncontrast head CT without evidence of acute process again noting nonspecific mild smooth expansion of the sella and partially empty configuration better  assessed on recent MRI 06/13/2024.      Electronically signed by: Hugh Ponce  Date:    08/21/2024  Time:    13:32   Results for orders placed or performed during the hospital encounter of 06/13/24   MRI Brain W WO Contrast    Narrative    EXAMINATION:  MRI BRAIN W WO CONTRAST    CLINICAL HISTORY:  Dizziness, non-specific;Dizziness, persistent/recurrent, cardiac or vascular cause suspected;.  Dizziness and giddiness    TECHNIQUE:  Multiplanar multisequence MR imaging of the brain was performed before and after the uneventful intravenous administration of  mL Gadavist.  Diffusion weighted imaging was performed.  ADC map was generated.    COMPARISON:  Brain MRI dated 01/28/2023, head CT dated 09/28/2022    FINDINGS:  Intracranial compartment: Several sequences are mildly motion degraded.    There is no acute intracranial abnormality or change in the appearance of the brain compared to the prior studies.  Brain volume, ventricular size and position are normal.  There is no hemorrhage or mass/mass effect.  There are just a few scattered punctate foci of subcortical white matter FLAIR hyperintensity within the frontal lobes.  These findings are minimal, nonspecific and may reflect sequelae of minimal chronic small vessel disease.  There are no regions of restricted diffusion to suggest acute infarction.  There is no pathologic enhancement.  The basilar cisterns are open.  There is no abnormal extra-axial fluid collection.  Flow voids indicating patency are present in the major vessels at the base of the brain.  The cerebellar tonsils are in normal position.  The orbits are grossly normal.  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  There is nonocclusive stenosis in the dural sinuses at the junction of the transverse and sigmoid sinuses, left greater than right due to arachnoid granulation.  Similar findings were present on brain MRV.  There is no suspected dural sinus thrombosis.   There is a slightly high riding right jugular bulb    Skull/extracranial contents: Marrow signal intensity in the clivus and calvarium is grossly normal.  The included paranasal sinuses and mastoid air cells are clear.  The included facial soft tissues and scalp are normal.      Impression    1.  There is no acute abnormality or change compared to the prior studies.  There is no hemorrhage, mass/mass effect, acute infarction.  There is no pathologic enhancement.    2.  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  There is nonocclusive stenosis of the dural sinuses, left greater than right.  There is a slightly high riding right jugular bulb.      Electronically signed by: Dinh Salcedo MD  Date:    06/13/2024  Time:    11:48   Results for orders placed or performed during the hospital encounter of 01/31/23   MRA Brain without contrast    Narrative    EXAMINATION:  MRA BRAIN WITHOUT CONTRAST    CLINICAL HISTORY:  g43.009; Migraine without aura, not intractable, without status migrainosus.    TECHNIQUE:  Non-contrast 3-D time-of-flight intracranial MR angiography was performed through the [place] [facility] [place] with MIP reformatting.    COMPARISON:  MRI/MRV 01/28/2023.  CT of the head 09/28/2022.    FINDINGS  Vasculature: The intracranial arteries show normal anatomy without evidence of major branch occlusion or focal luminal narrowing. There is no suspicion of vascular malformation or saccular aneurysm.    Variant anatomy: Somewhat medial course of the right cavernous carotid artery as a variant of normal.      Impression    1. Essentially negative MRA of the head.      Electronically signed by: Hugh Ponce  Date:    01/31/2023  Time:    16:21   Results for orders placed or performed during the hospital encounter of 01/28/23   MRI Brain Without Contrast    Narrative    EXAMINATION:  MRI BRAIN WITHOUT CONTRAST    CLINICAL HISTORY:  G43.009 Migraine WO aura and WO status migrainosus,  not intractable; Migraine without aura, not intractable, without status migrainosus    TECHNIQUE:  Multiplanar multisequence MR imaging of the brain was performed without contrast.    COMPARISON:  CT 09/28/2022.    FINDINGS:  There is no acute hemorrhage or infarction.  Diffusion-weighted images demonstrate no restricted diffusion of acute ischemia.    There is a normal pattern of gray-white matter differentiation.    No extra-axial fluid collections.  The ventricles are normal size, shape and configuration.  Basal cisterns are patent.  The major intracranial vessels demonstrate normal flow voids.    The imaged paranasal sinuses and ethmoid air cells are well aerated.  The mastoid air cells are normally pneumatized.      Impression    No acute intracranial abnormality.      Electronically signed by: Gatito Nix  Date:    01/28/2023  Time:    15:01   Results for orders placed or performed during the hospital encounter of 01/28/23   MRV Brain Without Contrast    Narrative    EXAMINATION:  MRV BRAIN WITHOUT CONTRAST    CLINICAL HISTORY:  G43.009 Migraine WO aura and WO status migrainosus, not intractable;  Migraine without aura, not intractable, without status migrainosus    TECHNIQUE:  Noncontrast 2-D time-of-flight MRV of the head with coronal and sagittal source imaging and 3-dimensional MIP reconstructions.    COMPARISON:  MRI same day.    FINDINGS:  The superior sagittal sinus is patent.  The paired internal cerebral veins are patent.  The straight sinus is patent.    The bilateral transverse and sigmoid dural venous sinuses are patent to the jugular foramen.      Impression    Normal MRV of the brain.      Electronically signed by: Gatito Nix  Date:    01/28/2023  Time:    15:03       Lab Results   Component Value Date    BNP 22 09/28/2022     08/14/2024    K 4.1 08/14/2024    MG 2.4 09/28/2022     08/14/2024    CO2 23 08/14/2024    BUN 7 08/14/2024    CREATININE 0.8 08/14/2024    GLU 82  08/14/2024    HGBA1C 4.8 01/08/2024    AST 15 08/14/2024    ALT 11 08/14/2024    ALBUMIN 4.0 08/14/2024    ALBUMIN 4.6 12/28/2016    PROT 7.5 08/14/2024    BILITOT 0.6 08/14/2024    CHOL 230 (H) 08/14/2024    HDL 41 08/14/2024    LDLCALC 161.2 (H) 08/14/2024    TRIG 139 08/14/2024       Lab Results   Component Value Date    WBC 5.00 08/14/2024    HGB 11.8 (L) 08/14/2024    HCT 38.1 08/14/2024    MCV 89 08/14/2024     08/14/2024       Lab Results   Component Value Date    TSH 0.986 08/14/2024           Assessment & Plan:       Problem List Items Addressed This Visit          Neuro    History of migraine    Intractable chronic migraine without aura and without status migrainosus - Primary    Overview     Migraine headaches for many years. Headaches are typically unilateral, moderate to severe in intensity, worsen with activity, pounding in quality and associated with sensitivity to light. Gradual progression pattern, lack of red flag features on history, and normal neurological exam are reassuring for primary as opposed to secondary etiology of headaches thus imaging will not be pursued for this history and this exam at this time.  Has had recent imaging as well.    She wishes to avoid any medications that have constipation side effect. For that reason, we will avoid Aimovig and Qulitpa. She is very needle phobic so we will avoid Botox. She does tolerate autoinjectors so will trial Ajovy.    For acute attacks, trial triptan             Current Assessment & Plan     History of stroke unclear and not seen on imaging I have available. Patient reports an incident in 2021 where she went to the ER with sounds like a severe migraine. No stroke noted in chart nor seen on imaging (reviewed with attending as well).          Relevant Medications    fremanezumab-vfrm (AJOVY AUTOINJECTOR) 225 mg/1.5 mL autoinjector    rizatriptan (MAXALT) 10 MG tablet    magnesium oxide (MAG-OX) 400 mg (241.3 mg magnesium) tablet    Other  Relevant Orders    Ambulatory referral/consult to Physical/Occupational Therapy       Other    EDISON on CPAP    Overview     Last Assessment & Plan:    Formatting of this note might be different from the original.   Continue with CPAP         Current Assessment & Plan     Does not use CPAP. Discussed this is could be contributing to her headaches and partially empty sella.           Other Visit Diagnoses       Empty sella        Long discussion on this finding and that it is not diagnostic of IIH. Possibly related to her untreated sleep apnea. Will refer to [facility] neuro given tinnitus    Relevant Orders    Ambulatory referral/consult to Vascular Neurology    Pulsatile tinnitus        Relevant Orders    Ambulatory referral/consult to Vascular Neurology              Regarding IIH diagnosis: unclear if she does have this. Will request records from original neurologist and ophthalmology. But if no papilledema and no vision changes, no need for LP at this time.   ----------------------------------------------------------------------------  Please call our clinic at [phone] or send a message on the [employer] portal if there are any changes to the plan described below, for example,if you are not contacted for the requested tests, referral(s) within one week, if you are unable to receive the medications prescribed, or if you feel you need to change the treatment course for any reason.     TESTING:  -- none at this time    REFERRALS:  -- add on PT for cervical myofascial pain  -- vascular neuro for opinion on MRI    PREVENTION (use daily regardless of headache):  -- start magnesium in ONE of the following preparations -               1. Magnesium oxide 800mg daily (the most common over the counter kind, may causes loose stools)              2. Magnesium citrate 400-500mg daily (harder to find, but more neutral on the bowels)              3. Magnesium glycinate 400mg daily (hardest to find, look online, but most bowel-neutral,  best absorbed)   -- START Ajovy once monthly    AS-NEEDED TREATMENT (use total no more than 10 days per month unless otherwise stated):  -- TRIAL rizatriptan with next migraine attack. You can repeat two hours later if needed      Follow up in about 3 months (around 1/3/2025).    Face to Face time with patient: 50  65 minutes of total time spent on the encounter, which includes face to face time and non-face to face time on day of visit preparing to see the patient (eg, review of tests), Obtaining and/or reviewing separately obtained history, Documenting clinical information in the electronic or other health record, Independently interpreting results (not separately reported) and communicating results to the patient/family/caregiver, or Care coordination (not separately reported).     Erin To NP

## 2024-10-03 NOTE — ASSESSMENT & PLAN NOTE
Does not use CPAP. Discussed this is could be contributing to her headaches and partially empty sella.

## 2024-10-03 NOTE — PATIENT INSTRUCTIONS
Please call our clinic at [phone] or send a message on the [employer] portal if there are any changes to the plan described below, for example,if you are not contacted for the requested tests, referral(s) within one week, if you are unable to receive the medications prescribed, or if you feel you need to change the treatment course for any reason.     TESTING:  -- none at this time    REFERRALS:  -- add on PT for cervical myofascial pain  -- vascular neuro for opinion on MRI    PREVENTION (use daily regardless of headache):  -- start magnesium in ONE of the following preparations -               1. Magnesium oxide 800mg daily (the most common over the counter kind, may causes loose stools)              2. Magnesium citrate 400-500mg daily (harder to find, but more neutral on the bowels)              3. Magnesium glycinate 400mg daily (hardest to find, look online, but most bowel-neutral, best absorbed)   -- START Ajovy once monthly    AS-NEEDED TREATMENT (use total no more than 10 days per month unless otherwise stated):  -- TRIAL rizatriptan with next migraine attack. You can repeat two hours later if needed

## 2024-10-03 NOTE — ASSESSMENT & PLAN NOTE
History of stroke unclear and not seen on imaging I have available. Patient reports an incident in 2021 where she went to the ER with sounds like a severe migraine. No stroke noted in chart nor seen on imaging (reviewed with attending as well).

## 2024-10-07 ENCOUNTER — CLINICAL SUPPORT (OUTPATIENT)
Dept: REHABILITATION | Facility: HOSPITAL | Age: 39
End: 2024-10-07
Payer: COMMERCIAL

## 2024-10-07 DIAGNOSIS — G89.29 CHRONIC PAIN OF LEFT KNEE: Primary | ICD-10-CM

## 2024-10-07 DIAGNOSIS — R29.898 DECREASED STRENGTH OF LOWER EXTREMITY: ICD-10-CM

## 2024-10-07 DIAGNOSIS — R29.898 IMPAIRED FLEXIBILITY OF LOWER EXTREMITY: ICD-10-CM

## 2024-10-07 DIAGNOSIS — M22.8X2 PATELLAR TRACKING DISORDER OF LEFT KNEE: ICD-10-CM

## 2024-10-07 DIAGNOSIS — M25.562 CHRONIC PAIN OF LEFT KNEE: Primary | ICD-10-CM

## 2024-10-07 PROBLEM — M22.8X9 PATELLAR TRACKING DISORDER: Status: ACTIVE | Noted: 2024-10-07

## 2024-10-07 PROCEDURE — 97161 PT EVAL LOW COMPLEX 20 MIN: CPT | Mod: PN | Performed by: PHYSICAL THERAPIST

## 2024-10-07 PROCEDURE — 97530 THERAPEUTIC ACTIVITIES: CPT | Mod: PN | Performed by: PHYSICAL THERAPIST

## 2024-10-08 ENCOUNTER — TELEPHONE (OUTPATIENT)
Dept: OBSTETRICS AND GYNECOLOGY | Facility: CLINIC | Age: 39
End: 2024-10-08
Payer: COMMERCIAL

## 2024-10-08 NOTE — TELEPHONE ENCOUNTER
----- Message from Veknat sent at 10/8/2024 12:58 PM CDT -----  Contact: Self  Type:  Patient Returning Call    Who Called:  Patient  Who Left Message for Patient:  Michaeljadeann marie  Does the patient know what this is regarding?:  Yes  Best Call Back Number:  [phone]   Additional Information:

## 2024-10-08 NOTE — PLAN OF CARE
"[facility]   Physical Therapy Initial Evaluation     Name: Amara Reis  Clinic Number: [id number]    Therapy Diagnosis:   Encounter Diagnoses   Name Primary?    Chronic pain of left knee Yes    Decreased strength of lower extremity     Impaired flexibility of lower extremity     Patellar tracking disorder of left knee         Physician: Luiz Parra,*    Physician Orders: PT Eval and Treat   Medical Diagnosis from Referral: Chronic pain of left knee   Evaluation Date: 10/7/2024  Authorization Period Expiration: 12/31/2024  Plan of Care Expiration: 11/22/2024  Progress Note Due: 11/7/2024  Visit # / Visits authorized: 1/ 1  (POC 1/12)   FOTO: 1/3    Precautions: Standard   Insurance: Payor: [employer] BLUE SHIELD / Plan: BCBS ALL OUT OF STATE / Product Type: PPO /     Time In: 1400  Time Out: 1500  Total Appointment Time (timed & untimed codes): 60 minutes      SUBJECTIVE   Date of onset: chronic 1 year    History of current condition - Amara reports: a chronic history of left knee pain. She reports difficulty with lifting her leg to get in bed. She notes pain with bending. She reports pain with ascending stairs. She reports pain along the lateral border of the patella. The patient reports hearing a clicking noise in her left knee. She complains of pain with prolonged walking and standing    Falls: 0    Imaging, X-rays: "  A fracture of the femur, patella, tibia or fibula is not seen.  The joint spaces are well maintained.  No joint effusion is seen."    Prior Therapy: none  Social History:  lives with their family  Occupation:   Prior Level of Function: Independent  Current Level of Function: Decreased ability to perform ADL and limited tolerance to standing and walking.    Pain:  Current 0/10, worst 7/10, best 0/10   Location: left knee    Description: Aching and stabbing  Aggravating Factors: Standing and Walking  Easing Factors: rest    Patients goals: " Decrease pain     Medical History:   Past Medical History:   Diagnosis Date    Anemia     Clostridium difficile colitis     hx--reoccurs with antibiotic use    Cystitis 05/20/2014    Endometriosis     Glucose intolerance     Hypertrophy of nasal turbinates 02/18/2019    Intramural leiomyoma of uterus 12/01/2016    Ovarian cyst     Ovarian cyst 05/20/2014    Pelvic pain in female     Sleep apnea, unspecified     Thyroid disease     Thyroid nodule    Urinary tract infection, site not specified        Surgical History:   Amara Reis  has a past surgical history that includes pr exploratory of abdomen (2012); Vaginal delivery; pr exploratory of abdomen (04/15/2014); Laser laparoscopy (07/01/2013); Myomectomy (2013); Excision of lesion of nose (Bilateral, 02/18/2019); Myringotomy with insertion of ventilation tube (Right, 02/18/2019); and Dilation and curettage of uterus.    Medications:   Amara has a current medication list which includes the following prescription(s): alprazolam, azelastine, chlorhexidine, ergocalciferol, fluticasone propionate, ajovy autoinjector, magnesium oxide, wescap-pn dha, rizatriptan, tranexamic acid, and wegovy.    Allergies:   Review of patient's allergies indicates:   Allergen Reactions    Morphine Hives, Itching and Nausea And Vomiting    Flagyl [metronidazole hcl] Other (See Comments)     Oral form only--burns her   stomach. Take IV only.  Had to have surgery to fix this          OBJECTIVE     Posture: Decreased lumbar lordosis and forward head in standing  Palpation: Moderate point tenderness noted with palpation of the lateral border of the patella  Sensation: Intact    Range of Motion/Strength:     Knee ROM Left Right Pain/Dysfunction with Movement   Knee flexion 0* degrees 0* degress    Knee extension 132 degrees 134 degrees        Knee MMT Left Right   Knee flexion 4/5 5/5   Knee extension 4/5 5/5   Hip flexion 5/5 5/5   Hip extension  4/5 4/5   Hip abduction 4/5 4/5   Hip  external rotation  4/5 4/5   Hip internal rotation  4+/5 4+/5       Flexibility Left Right   Hamstrings 52 degrees 54 degrees   Achilles 0 degrees 0 degrees       Special Tests:  Left Right   Anterior drawer negative. negative.   Lachman negative. negative.   Valgus stress  negative. negative.   Varus stress negative. negative.   Shay negative. negative.   Lateral tracking positive. positive.     Gait Without AD   Analysis The patient ambulates with bilateral LE externally rotated in stance phase       Limitation/Restriction for FOTO  Survey    Therapist reviewed FOTO scores for Amara Reis on 10/7/2024.   FOTO documents entered into [employer] - see Media section.    Intake Score: 31%         TREATMENT     Total Treatment time (time-based codes) separate from Evaluation: 30 minutes      Amara received the treatments listed below:      THERAPEUTIC ACTIVITIES to improve dynamic and functional performance for 30 minutes including :.     Seated Hamstring stretch 3 x 30 sec  Seated Gastroc-soleus stretch 3 x 30 sec  Straight leg raise with hip external rotation 3 x 10  Side lying hip abduction 3 x 10  Prone hip extension 3 x 10  Bridging 3 x 10     KT-Taping for anterior knee pain. Medial and lateral I strips with J around patella and base strip at inferior pole     PATIENT EDUCATION AND HOME EXERCISES     Education provided:   - Ice for pain management as needed    Written Home Exercises Provided: yes. Exercises were reviewed and Amara was able to demonstrate them prior to the end of the session.  Amara demonstrated good  understanding of the education provided. See EMR under Patient Instructions for exercises provided during therapy sessions.    ASSESSMENT     Amara is a 39 y.o. female referred to outpatient Physical Therapy with a medical diagnosis of Chronic pain of left knee . Patient presents with :    Left knee pain    Patient prognosis is Good.   Patientt will benefit from skilled outpatient Physical  Therapy to address the deficits stated above and in the chart below, provide patient /family education, and to maximize patientt's level of independence.     Plan of care discussed with patient: Yes  Patient's spiritual, cultural and educational needs considered and patient is agreeable to the plan of care and goals as stated below:     Anticipated Barriers for therapy: none    Medical Necessity is demonstrated by the following  History  Co-morbidities and personal factors that may impact the plan of care [x] LOW: no personal factors / co-morbidities  [] MODERATE: 1-2 personal factors / co-morbidities  [] HIGH: 3+ personal factors / co-morbidities    Moderate / High Support Documentation:      Examination  Body Structures and Functions, activity limitations and participation restrictions that may impact the plan of care [x] LOW: addressing 1-2 elements  [] MODERATE: 3+ elements  [] HIGH: 4+ elements (please support below)    Moderate / High Support Documentation:      Clinical Presentation [x] LOW: stable  [] MODERATE: Evolving  [] HIGH: Unstable     Decision Making/ Complexity Score: low       Goals:      SHORT TERM GOALS:  3 weeks  Progress Date met   The patient will begin a written HEP [] Met  [] Not Met  [] Progressing     Increase hamstring flexibility to 60* [] Met  [] Not Met  [] Progressing     Decrease soft tissue tenderness to mild [] Met  [] Not Met  [] Progressing     Increase posterior hip strength to 4+/5 [] Met  [] Not Met  [] Progressing          LONG TERM GOALS: 6 weeks  Progress Date met   The patient will be independent with a HEP for maintenance [] Met  [] Not Met  [] Progressing     Increase hamstring flexibility to 75* [] Met  [] Not Met  [] Progressing     Increase knee strength to 5/5 [] Met  [] Not Met  [] Progressing     The patient will be able to ascend/descend 20 step/stairs without onset of symptoms.   [] Met  [] Not Met  [] Progressing     Increase FOTO to < 56%. [] Met  [] Not  Met  [] Progressing     Increase posterior hip strength to 5/5 [] Met  [] Not Met  [] Progressing           PLAN   Plan of care Certification: 10/7/2024 to 11/22/2024.    Outpatient Physical Therapy 2 times weekly for 6 weeks to include the following interventions: Electrical Stimulation NMES, Manual Therapy, Neuromuscular Re-ed, Patient Education, Therapeutic Activities, and Therapeutic Exercise.     Dereck Lunsford, MS, PT, ATC      I CERTIFY THE NEED FOR THESE SERVICES FURNISHED UNDER THIS PLAN OF TREATMENT AND WHILE UNDER MY CARE   Physician's comments:     Physician's Signature: ___________________________________________________

## 2024-10-08 NOTE — TELEPHONE ENCOUNTER
Spoke with pt to reschedule appt with Dr. Jim. New appt set for 10/28/24 @ 1:30 pm. Pt verbalized understanding.

## 2024-10-10 ENCOUNTER — CLINICAL SUPPORT (OUTPATIENT)
Dept: REHABILITATION | Facility: HOSPITAL | Age: 39
End: 2024-10-10
Payer: COMMERCIAL

## 2024-10-10 DIAGNOSIS — M25.562 CHRONIC PAIN OF LEFT KNEE: Primary | ICD-10-CM

## 2024-10-10 DIAGNOSIS — R29.898 IMPAIRED FLEXIBILITY OF LOWER EXTREMITY: ICD-10-CM

## 2024-10-10 DIAGNOSIS — R29.898 DECREASED STRENGTH OF LOWER EXTREMITY: ICD-10-CM

## 2024-10-10 DIAGNOSIS — G89.29 CHRONIC PAIN OF LEFT KNEE: Primary | ICD-10-CM

## 2024-10-10 PROCEDURE — 97110 THERAPEUTIC EXERCISES: CPT | Mod: PN,CQ

## 2024-10-10 PROCEDURE — 97530 THERAPEUTIC ACTIVITIES: CPT | Mod: PN,CQ

## 2024-10-10 NOTE — PROGRESS NOTES
[facility]   Physical Therapy Treatment Note      Name: Amara Reis  Clinic Number: [id number]    Therapy Diagnosis:   Encounter Diagnoses   Name Primary?    Chronic pain of left knee Yes    Decreased strength of lower extremity     Impaired flexibility of lower extremity      Physician: Sejal Guerrero, *    Visit Date: 10/10/2024    Physician Orders: PT Eval and Treat   Medical Diagnosis from Referral: Chronic pain of left knee   Evaluation Date: 10/7/2024  Authorization Period Expiration: 12/31/2024  Plan of Care Expiration: 11/22/2024  Progress Note Due: 11/7/2024  Visit # / Visits authorized: 1/ 20  (POC 1/12)   FOTO: 1/3     Precautions: Standard   Insurance: Payor: [employer] BLUE SHIELD / Plan: BCBS ALL OUT OF STATE / Product Type: PPO /      Time In: 300  Time Out: 400  Total Appointment Time (timed & untimed codes): 60 minutes     PTA Visit #: 1/5       Subjective     Patient reports: knee is doing alright upon arrival, migraine since yesterday.   She was not compliant with home exercise program.  Response to previous treatment: no issues   Functional change: ongoing    Pain: 1/10  Location: left knee      Objective      Objective Measures updated at progress report unless specified.     Treatment     Amara received the treatments listed below:      therapeutic exercises to develop strength, ROM, and flexibility for 30 minutes including:    Seated Hamstring stretch 3 x 30 sec  Seated Gastroc-soleus stretch 3 x 30 sec  Straight leg raise with hip external rotation 3 x 10  Short Arc Quads 2# 3 x 10   Side lying hip abduction 3 x 10  Prone hip extension 3 x 10  Bridging Green Theraband 3 x 10   Side lying clams Green Theraband 3 x 10 Bilateral     neuromuscular re-education activities to improve: Coordination and Posture for 0 minutes. The following activities were included:      THERAPEUTIC ACTIVITIES to improve dynamic and functional performance for 15 minutes including  :.     Lateral walks Red Theraband 10 yards x 3 laps   Shuttle double leg 50# 3 x 10   Shuttle single leg 37# 3 x 10 Bilateral   Precor hip abduction 50# 3 x 10       Patient Education and Home Exercises       Education provided:   - home exercises    Written Home Exercises Provided: Pt instructed to continue prior HEP. Exercises were reviewed and Amara was able to demonstrate them prior to the end of the session.  Amara demonstrated good  understanding of the education provided. See Electronic Medical Record under Patient Instructions for exercises provided during therapy sessions    Assessment     Amara performed exercises well with focus on left lower extremity strength, appropriate muscle fatigue/burn. Noted knee hyperextension, along with weakness in Bilateral hips. Progress as able.     Amara Is progressing well towards her goals.   Patient prognosis is Good.     Patient will continue to benefit from skilled outpatient physical therapy to address the deficits listed in the problem list box on initial evaluation, provide pt/family education and to maximize pt's level of independence in the home and community environment.     Patient's spiritual, cultural and educational needs considered and pt agreeable to plan of care and goals.     Anticipated barriers to physical therapy: none     Goals:   SHORT TERM GOALS:  3 weeks  Progress Date met   The patient will begin a written HEP [] Met  [] Not Met  [x] Progressing     Increase hamstring flexibility to 60* [] Met  [] Not Met  [x] Progressing     Decrease soft tissue tenderness to mild [] Met  [] Not Met  [x] Progressing     Increase posterior hip strength to 4+/5 [] Met  [] Not Met  [x] Progressing           LONG TERM GOALS: 6 weeks  Progress Date met   The patient will be independent with a HEP for maintenance [] Met  [] Not Met  [x] Progressing     Increase hamstring flexibility to 75* [] Met  [] Not Met  [x] Progressing     Increase knee strength to 5/5 []  Met  [] Not Met  [x] Progressing     The patient will be able to ascend/descend 20 step/stairs without onset of symptoms.    [] Met  [] Not Met  [x] Progressing     Increase FOTO to < 56%. [] Met  [] Not Met  [x] Progressing     Increase posterior hip strength to 5/5 [] Met  [] Not Met  [x] Progressing          Plan     Continue per Plan of Care     Pham Escalona, PTA

## 2024-10-11 ENCOUNTER — OFFICE VISIT (OUTPATIENT)
Dept: NEUROLOGY | Facility: CLINIC | Age: 39
End: 2024-10-11
Payer: COMMERCIAL

## 2024-10-11 DIAGNOSIS — E23.6 EMPTY SELLA: ICD-10-CM

## 2024-10-11 DIAGNOSIS — H93.A9 PULSATILE TINNITUS: ICD-10-CM

## 2024-10-11 PROCEDURE — 99999 PR PBB SHADOW E&M-EST. PATIENT-LVL I: CPT | Mod: PBBFAC,,, | Performed by: STUDENT IN AN ORGANIZED HEALTH CARE EDUCATION/TRAINING PROGRAM
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
## 2024-10-21 ENCOUNTER — CLINICAL SUPPORT (OUTPATIENT)
Dept: REHABILITATION | Facility: HOSPITAL | Age: 39
End: 2024-10-21
Payer: COMMERCIAL

## 2024-10-21 DIAGNOSIS — G89.29 CHRONIC PAIN OF LEFT KNEE: Primary | ICD-10-CM

## 2024-10-21 DIAGNOSIS — M25.562 CHRONIC PAIN OF LEFT KNEE: Primary | ICD-10-CM

## 2024-10-21 DIAGNOSIS — R29.898 DECREASED STRENGTH OF LOWER EXTREMITY: ICD-10-CM

## 2024-10-21 DIAGNOSIS — R29.898 IMPAIRED FLEXIBILITY OF LOWER EXTREMITY: ICD-10-CM

## 2024-10-21 PROCEDURE — 97530 THERAPEUTIC ACTIVITIES: CPT | Mod: PN | Performed by: PHYSICAL THERAPIST

## 2024-10-21 PROCEDURE — 97110 THERAPEUTIC EXERCISES: CPT | Mod: PN | Performed by: PHYSICAL THERAPIST

## 2024-10-21 NOTE — PROGRESS NOTES
[facility]   Physical Therapy Treatment Note      Name: Amara Reis  Clinic Number: [id number]    Therapy Diagnosis:   Encounter Diagnoses   Name Primary?    Chronic pain of left knee Yes    Decreased strength of lower extremity     Impaired flexibility of lower extremity        Physician: Sejal Guerrero, *    Visit Date: 10/21/2024    Physician Orders: PT Eval and Treat   Medical Diagnosis from Referral: Chronic pain of left knee   Evaluation Date: 10/7/2024  Authorization Period Expiration: 12/31/2024  Plan of Care Expiration: 11/22/2024  Progress Note Due: 11/7/2024  Visit # / Visits authorized: 2/ 20  (POC 3/12)   FOTO: 1/3     Precautions: Standard   Insurance: Payor: [employer] BLUE SHIELD / Plan: BCBS ALL OUT OF STATE / Product Type: PPO /      Time In: 1500  Time Out: 1600  Total Appointment Time (timed & untimed codes): 60 minutes     PTA Visit #: 0/5       Subjective     Patient reports: shahla is doing ok. Wants to try cervical dry needling next time.   She was not compliant with home exercise program.  Response to previous treatment: no issues   Functional change: ongoing    Pain: 1/10  Location: left knee      Objective      Objective Measures updated at progress report unless specified.     Treatment     Amara received the treatments listed below:      therapeutic exercises to develop strength, ROM, and flexibility for 30 minutes including:    Seated Hamstring stretch 3 x 30 sec  Seated Gastroc-soleus stretch 3 x 30 sec  Straight leg raise with hip external rotation 3 x 10  Short arc quads  with 4 inch bolster 3 x 10 2#  Short arc quads  with 6 inch bolster 3 x 10 2#  Side lying hip abduction 3 x 10  Prone hip extension 3 x 10  Bridging Green Theraband 3 x 10   Side lying clams Green Theraband 3 x 10 Bilateral     neuromuscular re-education activities to improve: Coordination and Posture for 0 minutes. The following activities were included:      THERAPEUTIC  ACTIVITIES to improve dynamic and functional performance for 30 minutes including :.     Lateral walks Red Theraband 10 yards x 3 laps   Shuttle leg press 3 x 10 50#  Shuttle single leg press 3 x 10 37#  Shuttle calf press 3 x 10 50#    Precor hip abduction 50# 3 x 10   Precor Hip adduction 3 x 10 45#   Precor Hamstring curl seat 3 x 10 30#       Forward step ups 3 x 10 4 inch step  Lateral step ups 3 x 10 4 inch step       Patient Education and Home Exercises       Education provided:   - home exercises    Written Home Exercises Provided: Pt instructed to continue prior HEP. Exercises were reviewed and Amara was able to demonstrate them prior to the end of the session.  Amara demonstrated good  understanding of the education provided. See Electronic Medical Record under Patient Instructions for exercises provided during therapy sessions    Assessment     Treatment focused on left knee and posterior hip strengthening. Patient tolerated treatment well without report of adverse effects.    Amara Is progressing well towards her goals.   Patient prognosis is Good.     Patient will continue to benefit from skilled outpatient physical therapy to address the deficits listed in the problem list box on initial evaluation, provide pt/family education and to maximize pt's level of independence in the home and community environment.     Patient's spiritual, cultural and educational needs considered and pt agreeable to plan of care and goals.     Anticipated barriers to physical therapy: none     Goals:   SHORT TERM GOALS:  3 weeks  Progress Date met   The patient will begin a written HEP [] Met  [] Not Met  [x] Progressing     Increase hamstring flexibility to 60* [] Met  [] Not Met  [x] Progressing     Decrease soft tissue tenderness to mild [] Met  [] Not Met  [x] Progressing     Increase posterior hip strength to 4+/5 [] Met  [] Not Met  [x] Progressing           LONG TERM GOALS: 6 weeks  Progress Date met   The patient  will be independent with a HEP for maintenance [] Met  [] Not Met  [x] Progressing     Increase hamstring flexibility to 75* [] Met  [] Not Met  [x] Progressing     Increase knee strength to 5/5 [] Met  [] Not Met  [x] Progressing     The patient will be able to ascend/descend 20 step/stairs without onset of symptoms.    [] Met  [] Not Met  [x] Progressing     Increase FOTO to < 56%. [] Met  [] Not Met  [x] Progressing     Increase posterior hip strength to 5/5 [] Met  [] Not Met  [x] Progressing          Plan     Continue per Plan of Care     Dereck Lunsford, PT

## 2024-10-28 ENCOUNTER — OFFICE VISIT (OUTPATIENT)
Dept: OBSTETRICS AND GYNECOLOGY | Facility: CLINIC | Age: 39
End: 2024-10-28
Payer: COMMERCIAL

## 2024-10-28 VITALS
WEIGHT: 143.44 LBS | DIASTOLIC BLOOD PRESSURE: 74 MMHG | SYSTOLIC BLOOD PRESSURE: 108 MMHG | HEIGHT: 60 IN | BODY MASS INDEX: 28.16 KG/M2

## 2024-10-28 DIAGNOSIS — Z01.419 WELL WOMAN EXAM: Primary | ICD-10-CM

## 2024-10-28 DIAGNOSIS — Z12.4 CERVICAL CANCER SCREENING: ICD-10-CM

## 2024-10-28 DIAGNOSIS — N92.0 MENORRHAGIA WITH REGULAR CYCLE: ICD-10-CM

## 2024-10-28 DIAGNOSIS — R10.2 PELVIC PAIN IN FEMALE: ICD-10-CM

## 2024-10-28 PROCEDURE — 99999 PR PBB SHADOW E&M-EST. PATIENT-LVL III: CPT | Mod: PBBFAC,,, | Performed by: GENERAL PRACTICE

## 2024-10-28 PROCEDURE — 87624 HPV HI-RISK TYP POOLED RSLT: CPT | Performed by: GENERAL PRACTICE

## 2024-10-28 PROCEDURE — 3044F HG A1C LEVEL LT 7.0%: CPT | Mod: CPTII,S$GLB,, | Performed by: GENERAL PRACTICE

## 2024-10-28 PROCEDURE — 99395 PREV VISIT EST AGE 18-39: CPT | Mod: S$GLB,,, | Performed by: GENERAL PRACTICE

## 2024-10-28 PROCEDURE — 3008F BODY MASS INDEX DOCD: CPT | Mod: CPTII,S$GLB,, | Performed by: GENERAL PRACTICE

## 2024-10-28 PROCEDURE — 3078F DIAST BP <80 MM HG: CPT | Mod: CPTII,S$GLB,, | Performed by: GENERAL PRACTICE

## 2024-10-28 PROCEDURE — 1159F MED LIST DOCD IN RCRD: CPT | Mod: CPTII,S$GLB,, | Performed by: GENERAL PRACTICE

## 2024-10-28 PROCEDURE — 3074F SYST BP LT 130 MM HG: CPT | Mod: CPTII,S$GLB,, | Performed by: GENERAL PRACTICE

## 2024-10-30 LAB
HPV HR 12 DNA SPEC QL NAA+PROBE: NEGATIVE
HPV16 AG SPEC QL: NEGATIVE
HPV18 DNA SPEC QL NAA+PROBE: NEGATIVE

## 2024-11-05 ENCOUNTER — OFFICE VISIT (OUTPATIENT)
Dept: FAMILY MEDICINE | Facility: CLINIC | Age: 39
End: 2024-11-05
Payer: COMMERCIAL

## 2024-11-05 VITALS
DIASTOLIC BLOOD PRESSURE: 88 MMHG | HEART RATE: 79 BPM | BODY MASS INDEX: 27.9 KG/M2 | SYSTOLIC BLOOD PRESSURE: 130 MMHG | WEIGHT: 142.13 LBS | HEIGHT: 60 IN

## 2024-11-05 DIAGNOSIS — E66.09 CLASS 1 OBESITY DUE TO EXCESS CALORIES WITHOUT SERIOUS COMORBIDITY WITH BODY MASS INDEX (BMI) OF 30.0 TO 30.9 IN ADULT: ICD-10-CM

## 2024-11-05 DIAGNOSIS — E66.811 CLASS 1 OBESITY DUE TO EXCESS CALORIES WITHOUT SERIOUS COMORBIDITY WITH BODY MASS INDEX (BMI) OF 30.0 TO 30.9 IN ADULT: ICD-10-CM

## 2024-11-05 DIAGNOSIS — Z71.3 WEIGHT LOSS COUNSELING, ENCOUNTER FOR: Primary | ICD-10-CM

## 2024-11-05 PROCEDURE — 3075F SYST BP GE 130 - 139MM HG: CPT | Mod: CPTII,S$GLB,, | Performed by: FAMILY MEDICINE

## 2024-11-05 PROCEDURE — 1159F MED LIST DOCD IN RCRD: CPT | Mod: CPTII,S$GLB,, | Performed by: FAMILY MEDICINE

## 2024-11-05 PROCEDURE — 99999 PR PBB SHADOW E&M-EST. PATIENT-LVL III: CPT | Mod: PBBFAC,,, | Performed by: FAMILY MEDICINE

## 2024-11-05 PROCEDURE — 99213 OFFICE O/P EST LOW 20 MIN: CPT | Mod: S$GLB,,, | Performed by: FAMILY MEDICINE

## 2024-11-05 PROCEDURE — 3044F HG A1C LEVEL LT 7.0%: CPT | Mod: CPTII,S$GLB,, | Performed by: FAMILY MEDICINE

## 2024-11-05 PROCEDURE — 3079F DIAST BP 80-89 MM HG: CPT | Mod: CPTII,S$GLB,, | Performed by: FAMILY MEDICINE

## 2024-11-05 PROCEDURE — 3008F BODY MASS INDEX DOCD: CPT | Mod: CPTII,S$GLB,, | Performed by: FAMILY MEDICINE

## 2024-11-05 RX ORDER — SEMAGLUTIDE 1.7 MG/.75ML
1.7 INJECTION, SOLUTION SUBCUTANEOUS
Qty: 3 ML | Refills: 2 | Status: SHIPPED | OUTPATIENT
Start: 2024-11-05 | End: 2025-02-03

## 2024-11-05 NOTE — PROGRESS NOTES
"  SUBJECTIVE:    Patient ID: Amara Reis is a 39 y.o. female.    Chief Complaint: Weight Loss  37 yo female here today to follow-up on her medical weight loss management . Past medical history is notable for PCOS.  Patient was started on Wegovy at last visit patient's states the medications working well she is not complaining of any unwanted side effects.  She has lost 20 lb in the last 4 months.           High school weight:  85  Ideal Body weight 100  Goal Weight 120     06/23/24 160  08/02/24 151  11/05/24 142     History of uncontrolled hypertension? no  History of cardiovascular disease? no  History of glaucoma? no  History of eating disorder? As a child, used to starve self "to stay skinny" under her mother's guidance. Was 85 lb at first pregnancy  History of substance use? none  History of cholelithiasis? no  History of pancreatitis? no  Personal or family history of Medullary Thyroid Cancer or MEN2? No      Smoke: None  ETOH: none  Exercise: minimal    HPI      Past Medical History:   Diagnosis Date    Anemia     Clostridium difficile colitis     hx--reoccurs with antibiotic use    Cystitis 05/20/2014    Endometriosis     Glucose intolerance     Hypertrophy of nasal turbinates 02/18/2019    Intramural leiomyoma of uterus 12/01/2016    Ovarian cyst     Ovarian cyst 05/20/2014    Pelvic pain in female     Sleep apnea, unspecified     Thyroid disease     Thyroid nodule    Urinary tract infection, site not specified      Social History     Socioeconomic History    Marital status:    Tobacco Use    Smoking status: Never     Passive exposure: Never    Smokeless tobacco: Never   Substance and Sexual Activity    Alcohol use: No     Comment: rarely    Drug use: Never    Sexual activity: Yes     Partners: Male     Social Drivers of Health     Financial Resource Strain: Medium Risk (5/16/2024)    Overall Financial Resource Strain (CARDIA)     Difficulty of Paying Living Expenses: Somewhat hard   Food " Insecurity: No Food Insecurity (5/16/2024)    Hunger Vital Sign     Worried About Running Out of Food in the Last Year: Never true     Ran Out of Food in the Last Year: Never true   Transportation Needs: No Transportation Needs (2/13/2023)    [facility]    PRAPARE - Transportation     Lack of Transportation (Medical): No     Lack of Transportation (Non-Medical): No   Physical Activity: Inactive (5/16/2024)    Exercise Vital Sign     Days of Exercise per Week: 0 days     Minutes of Exercise per Session: 0 min   Stress: Stress Concern Present (5/16/2024)    [place] [employer] [place] [employer] - Occupational Stress Questionnaire     Feeling of Stress : Rather much   Housing Stability: Unknown (5/16/2024)    Housing Stability Vital Sign     Unable to Pay for Housing in the Last Year: No     Past Surgical History:   Procedure Laterality Date    EXCISION OF LESION OF NOSE Bilateral 02/18/2019    Procedure: EXCISION, LESION, NOSE;  Surgeon: Chadd Ward MD;  Location: [facility];  Service: ENT;  Laterality: Bilateral;    miscellaneous      see Dr. Jim note 10/28/24 for multiple GYN surgeries    MYRINGOTOMY WITH INSERTION OF VENTILATION TUBE Right 02/18/2019    Procedure: MYRINGOTOMY, WITH TYMPANOSTOMY TUBE INSERTION;  Surgeon: Chadd Ward MD;  Location: [facility] OR;  Service: ENT;  Laterality: Right;     Family History   Problem Relation Name Age of Onset    Hypertension Mother Candy mccauley     Miscarriages / Stillbirths Mother Candy mccauley     Fibromyalgia Mother Candy mccauley     Hypertension Father Philippe mccauley     Thyroid disease Brother      Breast cancer Neg Hx      Colon cancer Neg Hx       Current Outpatient Medications   Medication Sig Dispense Refill    ALPRAZolam (XANAX) 0.25 MG tablet Take 1 tablet (0.25 mg total) by mouth 3  (three) times daily as needed for Anxiety (dizziness). 30 tablet 0    azelastine (ASTELIN) 137 mcg (0.1 %) nasal spray 1 spray (137 mcg total) by Nasal route 2 (two) times daily. Point up and slightly outward toward ear when spraying to avoid irritating nasal septum. 90 mL 0    chlorhexidine (PERIDEX) 0.12 % solution SMARTSIG:By Mouth      ergocalciferol (VITAMIN D2) 50,000 unit Cap Take 1 capsule (50,000 Units total) by mouth every 7 days. 4 capsule 5    fluticasone propionate (FLONASE) 50 mcg/actuation nasal spray 1 spray (50 mcg total) by Each Nostril route 2 (two) times daily. Point up and slightly outward toward ear when spraying to avoid irritating nasal septum. 48 g 0    fremanezumab-vfrm (AJOVY AUTOINJECTOR) 225 mg/1.5 mL autoinjector Inject 1.5 mLs (225 mg total) into the skin every 28 days. 1 each 11    magnesium oxide (MAG-OX) 400 mg (241.3 mg magnesium) tablet Take 1 tablet (400 mg total) by mouth 2 (two) times daily. 60 tablet 12    multivit 47-iron-folate 1-dha (WESCAP-PN DHA) 27 mg iron-1 mg -300 mg Cap Take 1 tablet by mouth once daily. 30 capsule 5    rizatriptan (MAXALT) 10 MG tablet 1 tab PO PRN migraine. May repeat every 2 hours for max 3 tabs in 24 hours. Use no more than 10 days per month. 9 tablet 11    tranexamic acid (LYSTEDA) 650 mg tablet       WEGOVY 1.7 mg/0.75 mL PnIj Inject 1.7 mg into the skin every 7 days. 3 mL 2     No current facility-administered medications for this visit.     Review of patient's allergies indicates:   Allergen Reactions    Morphine Hives, Itching and Nausea And Vomiting    Flagyl [metronidazole hcl] Other (See Comments)     Oral form only--burns her   stomach. Take IV only.  Had to have surgery to fix this       Review of Systems   Constitutional:  Negative for activity change, diaphoresis, fatigue and unexpected weight change.   HENT:  Negative for congestion, hearing loss, postnasal drip, rhinorrhea, sinus pain and trouble swallowing.    Eyes:  Negative for  discharge and visual disturbance.   Respiratory:  Negative for cough, chest tightness, shortness of breath and wheezing.    Cardiovascular:  Negative for chest pain and palpitations.   Gastrointestinal:  Negative for blood in stool, constipation, diarrhea and vomiting.   Endocrine: Negative for polydipsia and polyuria.   Genitourinary:  Negative for difficulty urinating, dysuria, hematuria and menstrual problem.   Musculoskeletal:  Negative for arthralgias, joint swelling and neck pain.   Neurological:  Negative for weakness and headaches.   Psychiatric/Behavioral:  Negative for confusion and dysphoric mood.           Blood pressure 130/88, pulse 79, height 5' (1.524 m), weight 64.5 kg (142 lb 1.6 oz), last menstrual period 10/05/2024. Body mass index is 27.75 kg/m².   Objective:      Physical Exam  Vitals reviewed.   Constitutional:       General: She is not in acute distress.     Appearance: She is well-developed.   HENT:      Head: Normocephalic and atraumatic.      Right Ear: External ear normal.      Left Ear: External ear normal.   Eyes:      General:         Right eye: No discharge.         Left eye: No discharge.      Conjunctiva/sclera: Conjunctivae normal.      Pupils: Pupils are equal, round, and reactive to light.   Cardiovascular:      Rate and Rhythm: Normal rate and regular rhythm.      Heart sounds: Normal heart sounds. No murmur heard.  Pulmonary:      Effort: Pulmonary effort is normal. No respiratory distress.      Breath sounds: Normal breath sounds.   Skin:     General: Skin is warm and dry.         Assessment:       1. Weight loss counseling, encounter for    2. Class 1 obesity due to excess calories without serious comorbidity with body mass index (BMI) of 30.0 to 30.9 in adult         Plan:           Weight loss counseling, encounter for  -     WEGOVY 1.7 mg/0.75 mL PnIj; Inject 1.7 mg into the skin every 7 days.  Dispense: 3 mL; Refill: 2  Supplementing your diet with regular exercise is  essential for maintaining your weight loss. Physical activity increases the metabolic demand on your body and combined with healthy eating, can create a calorie deficit that results in weight loss. In addition to weight loss, physical activity has many health benefits, such as improved cardiovascular health, blood sugar control, improved pain control, and improved symptoms of depression and anxiety. It is recommended that adults engage in moderate-intensity physical activity 150min each week to maintain your weight.  Class 1 obesity due to excess calories without serious comorbidity with body mass index (BMI) of 30.0 to 30.9 in adult  -     WEGOVY 1.7 mg/0.75 mL PnIj; Inject 1.7 mg into the skin every 7 days.  Dispense: 3 mL; Refill: 2

## 2024-11-08 ENCOUNTER — HOSPITAL ENCOUNTER (OUTPATIENT)
Dept: RADIOLOGY | Facility: HOSPITAL | Age: 39
Discharge: HOME | End: 2024-11-08
Attending: GENERAL PRACTICE
Payer: COMMERCIAL

## 2024-11-08 DIAGNOSIS — R10.2 PELVIC PAIN IN FEMALE: ICD-10-CM

## 2024-11-08 DIAGNOSIS — Z01.419 WELL WOMAN EXAM: ICD-10-CM

## 2024-11-08 PROCEDURE — 76856 US EXAM PELVIC COMPLETE: CPT | Mod: TC,PO

## 2024-11-08 PROCEDURE — 76830 TRANSVAGINAL US NON-OB: CPT | Mod: 26,,, | Performed by: RADIOLOGY

## 2024-11-08 PROCEDURE — 76856 US EXAM PELVIC COMPLETE: CPT | Mod: 26,,, | Performed by: RADIOLOGY

## 2024-11-11 ENCOUNTER — PATIENT MESSAGE (OUTPATIENT)
Dept: FAMILY MEDICINE | Facility: CLINIC | Age: 39
End: 2024-11-11
Payer: COMMERCIAL

## 2024-11-11 ENCOUNTER — TELEPHONE (OUTPATIENT)
Dept: FAMILY MEDICINE | Facility: CLINIC | Age: 39
End: 2024-11-11
Payer: COMMERCIAL

## 2024-11-11 NOTE — TELEPHONE ENCOUNTER
Wegovy PA Denied        Close reason: Other  Payer: [employer] Patient's Payer Case ID: [id number]    [phone]  Note from payer: [employer] has identified an error with your request. CaseId:[id number];Status:Denied;Review Type:Prior Auth;Appeal Information: Attention:ATTN:  [facility] [address] [address],[place], [place],[address] Phone:[phone] Fax:[phone]; Important - Please read the below note on eAppeals: Please reference the denial letter for information on the rights for an appeal, rationale for the denial, and how to submit an appeal including if any information is needed to support the appeal. Note about urgent situations - Generally, an urgent situation is one which, in the opinion of the provider, the health of the patient may be in serious jeopardy or may experience pain that cannot be adequately controlled while waiting for a decision on the appeal.;. Please reach out to the payer for assistance.  View History

## 2024-11-12 ENCOUNTER — TELEPHONE (OUTPATIENT)
Dept: FAMILY MEDICINE | Facility: CLINIC | Age: 39
End: 2024-11-12
Payer: COMMERCIAL

## 2024-11-12 NOTE — TELEPHONE ENCOUNTER
Wegovy PA Approved    Prior authorization approved  Payer: [employer] Search Patient's Payer Case ID: [id number]    [phone]  Note from payer: CaseId:[id number];Status:Approved;Review Type:Prior Auth;Coverage Start Date:10/13/2024;Coverage End Date:06/10/2025;  Approval Details    Authorized from October 13, 2024 to Maddy 10, 2025  Electronic appeal: Not supported

## 2024-11-12 NOTE — TELEPHONE ENCOUNTER
Wegovy PA Approved    Approved  Prior Authorization Portal   Prior authorization approved  Payer: [employer] Patient's Payer Case ID: [id number]    [phone]  Note from payer: CaseId:[id number];Status:Approved;Review Type:Prior Auth;Coverage Start Date:10/13/2024;Coverage End Date:06/10/2025;  Approval Details    Authorized from October 13, 2024 to Maddy 10, 2025  Electronic appeal: Not supported  View History     Notes     Time User Attachment    Prior authorization approved  Payer: [employer] Patient's Payer Case ID: [id number]    [phone]  Note from payer: CaseId:[id number];Status:Approved;Review Type:Prior Auth;Coverage Start Date:10/13/2024;Coverage End Date:06/10/2025;  Approval Details     Authorized from October 13, 2024 to Maddy 10, 2025  Electronic appeal: Not supported 11/12/2024 12:38 PM Luisa Easley LPN     Prior authorization approved  Payer: [employer] Patient's Payer Case ID: [id number]    [phone]  Note from payer: CaseId:[id number];Status:Approved;Review Type:Prior Auth;Coverage Start Date:10/13/2024;Coverage End Date:06/10/2025;  Approval Details     Authorized from October 13, 2024 to Maddy 10, 2025  Electronic appeal: Not supported

## 2024-11-13 ENCOUNTER — OFFICE VISIT (OUTPATIENT)
Dept: OBSTETRICS AND GYNECOLOGY | Facility: CLINIC | Age: 39
End: 2024-11-13
Payer: COMMERCIAL

## 2024-11-13 VITALS
SYSTOLIC BLOOD PRESSURE: 104 MMHG | RESPIRATION RATE: 17 BRPM | HEIGHT: 60 IN | WEIGHT: 135.56 LBS | BODY MASS INDEX: 26.61 KG/M2 | DIASTOLIC BLOOD PRESSURE: 86 MMHG

## 2024-11-13 DIAGNOSIS — D21.9 LEIOMYOMA: ICD-10-CM

## 2024-11-13 DIAGNOSIS — Z30.09 FAMILY PLANNING: Primary | ICD-10-CM

## 2024-11-13 PROCEDURE — 99999 PR PBB SHADOW E&M-EST. PATIENT-LVL III: CPT | Mod: PBBFAC,,, | Performed by: GENERAL PRACTICE

## 2024-11-13 NOTE — PROGRESS NOTES
Background  10/28/2024  Amara Reis is a 39 y.o. here for WWE.  Says her  wants to have one more baby.  Has a prescription for clomid at home.  HMB (managed with TXA) and pelvic pain.  Already taking a PNV.  39 y.o. for WWE.  HMB and dysmenorrhea.          SUBJECTIVE   2024  Amara Reis is a 39 y.o. here for f/u of recent pelvic US.  Today in talking more about fertility, she said her  has a known low sperm count.  They had the 2yr old together and conceived on their own (no IUI/IVF).     G'sP's:   LMP: Patient's last menstrual period was 10/05/2024 (approximate).  Relationship:  5yrs and sexually active  Contraception: nothing (desires pregnancy)  PAP Hx: had abnormal PAP ~ with normal PAP's since (no procedures)  LAST PAP: 11/3/2024: PAP neg / HPV neg     ASSESSMENT / PLAN   39 y.o. with small uterine fibroid and otw normal pelvic US.  No indication that she'd need an HSG (EMS 2mm and has a 2yr old).  Advised that d/t age and low sperm count she should let me know if/when she would like a referral to [facility] for more aggressive management.  We discussed that in the meantime she is ovulatory based on regular menses.  Discussed BBT charting and timed intercourse - written information provided.    Cervical Cancer screening: next cervical CA screen (PAP+HPV) due 2029  Mammogram: age 40  RTC for periodic GYN exam, sooner prn    OBJECTIVE   Vitals:    24 1441   BP: 104/86   Resp: 17       GEN = alert/oriented, nad, pleasant and cheerful    LABS & RADS   PELVIC US (24) =  Uterus 10 x 3 x 6 cm  EMS 2 mm  Both ovaries are normal  Other: 1cm posterior uterine fibroid        Gisselle Jim MD    -----------------------  10/28/24:  GYNECOLOGIC HISTORY  Cervical CA Screen / Infectious   PAP Hx: had abnormal PAP ~ with normal PAP's since (no procedures)  Genital HSV: no  Other STD Hx: no past history   Bleeding   Menarche: 13 yoa  Period duration: 3-5 days  #  Heavy Days: none on TXA  Pad/tampon ? on heavy days: hourly if doesn't take TXA (has been on for 1yr)  Intermenstrual bleeding: No  Period frequency:regular every 28-30 days   Pain   Dysmenorrhea: yes: even on TXA  Non-menstrual pelvic pressure/pain: sometimes  Dyspareunia: No   Other   Vasomotor Sxs: denies  Vaginal dryness: denies      OBSTETRIC HISTORY  Living children: 3 (youngest is 1yo)  Vaginal deliveries: 3  Miscarriages <20wks: 2     PAST MEDICAL HISTORY  HLD  GYN: Endometriosis / PCOS / possible fibroids     PAST SURGICAL HISTORY      ----------------------------    Excision of lesion of nose     Procedure: EXCISION, LESION, NOSE;  Surgeon: Chadd Ward MD;  Location: [facility] OR;  Service: ENT;  Laterality: Bilateral;    Miscellaneous     see Dr. Jim note 10/28/24 for multiple GYN surgeries    Myringotomy with insertion of ventilation tube     Procedure: MYRINGOTOMY, WITH TYMPANOSTOMY TUBE INSERTION;  Surgeon: Chadd Ward MD;  Location: [facility] OR;  Service: ENT;  Laterality: Right;      Laparoscopy x 12 for ovarian cysts, endometriosis, laser vaporization of uterosacral nerve complexes (7/26/2013)  Lx --> laparotomy for possible myomectomy  D&C for tissue removal (was not a pregnancy loss per patient)        ALLERGIES        Review of patient's allergies indicates:   Allergen Reactions    Morphine Hives, Itching and Nausea And Vomiting    Flagyl [metronidazole hcl] Other (See Comments)       Oral form only--burns her   stomach. Take IV only.  Had to have surgery to fix this         MEDICATIONS       Current Outpatient Medications   Medication Instructions    AJOVY AUTOINJECTOR 225 mg, Subcutaneous, Every 28 days    ALPRAZolam (XANAX) 0.25 mg, Oral, 3 times daily PRN    azelastine (ASTELIN) 137 mcg, Nasal, 2 times daily, Point up and slightly outward toward ear when spraying to avoid irritating nasal septum.    chlorhexidine (PERIDEX) 0.12 % solution SMARTSIG:By Mouth    ergocalciferol (VITAMIN D2)  50,000 Units, Oral, Every 7 days    fluticasone propionate (FLONASE) 50 mcg, Each Nostril, 2 times daily, Point up and slightly outward toward ear when spraying to avoid irritating nasal septum.    magnesium oxide (MAG-OX) 400 mg, Oral, 2 times daily    multivit 47-iron-folate 1-dha (WESCAP-PN DHA) 27 mg iron-1 mg -300 mg Cap 1 tablet, Oral, Daily    rizatriptan (MAXALT) 10 MG tablet 1 tab PO PRN migraine. May repeat every 2 hours for max 3 tabs in 24 hours. Use no more than 10 days per month.    tranexamic acid (LYSTEDA) 650 mg tablet      WEGOVY 1.7 mg, Subcutaneous, Every 7 days       SOCIAL HISTORY  Lives with:  () and children  Smoker: non-smoker  Alcohol: denies  Drugs: denies  Domestic Violence: no  Occupation: homemaker     FAMILY HISTORY  BLEEDING or  CLOTTING DISORDERS: none  BREAST CA: none  UTERINE CA: none  OVARIAN CA: none  COLON CA: none  One of 13 children     See above

## 2024-11-18 DIAGNOSIS — F41.9 ANXIETY: ICD-10-CM

## 2024-11-18 DIAGNOSIS — R42 DIZZINESS: ICD-10-CM

## 2024-11-18 RX ORDER — ALPRAZOLAM 0.25 MG/1
TABLET ORAL
Qty: 30 TABLET | Refills: 0 | Status: SHIPPED | OUTPATIENT
Start: 2024-11-18

## 2024-11-19 ENCOUNTER — E-VISIT (OUTPATIENT)
Dept: FAMILY MEDICINE | Facility: CLINIC | Age: 39
End: 2024-11-19
Payer: COMMERCIAL

## 2024-11-19 DIAGNOSIS — R21 FACIAL RASH: Primary | ICD-10-CM

## 2024-11-19 NOTE — PROGRESS NOTES
Take care in choosing skin care products and cosmetics. The following tips may help:  · Choose gentle, oil-free soaps and facial cleansers.  · Avoid harsh acne scrubs, cleansers, or astringents. These types of products can irritate your skin.  · Read labels on makeup and moisturizers. Choose those that are water-based and oil-free. Look for the term noncomedogenic. It means that the product won't clog your pores.  · Gently wash your face or other affected skin twice a day with a mild cleanser. Using your fingertips, smooth the cleanser over your skin. Rinse your skin well. Pat it dry.  ·· Don't squeeze or pick blemishes, squeezing can cause scarring. Scars will remain after acne blemishes heal.  · Sponges, brushes, or other abrasive tools can irritate the skin. Avoid using them.    I will  send in 2 medication  Doxycycline, take one tablet every day, please make sure you take this with food and drink, it will make you sensitive to the sun so please make sure you are wearing sunscreen.      Benzoyl Peroxide. ( Be care full not to get on clothing or towels it may bleach out colored materials.    To effectively use benzoyl peroxide for treating facial acne, follow these instructions:  Cleanse Your Skin: Begin by washing your face with a gentle cleanser and lukewarm water to remove dirt and oil. Pat your skin dry with a clean towel.  Apply a Small Amount: Squeeze a pea-sized amount of benzoyl peroxide onto your fingertip. Apply it evenly over the affected areas, avoiding the eyes, lips, and mouth. Start with a lower concentration (2.5% or 5%) to minimize irritation, especially if you have sensitive skin.  Start Slowly: Use the product once a day initially, preferably in the evening, to see how your skin reacts. If you do not experience excessive dryness or irritation, you can gradually increase usage to twice a day.  Moisturize: After the benzoyl peroxide has dried, apply a non-comedogenic moisturizer to prevent  dryness and maintain skin hydration.  Use Sunscreen: Benzoyl peroxide can make your skin more sensitive to sunlight, so apply a broad-spectrum sunscreen with at least SPF 30 during the day, even on cloudy days.  Monitor your skin for any signs of irritation, such as redness or peeling, and reduce usage if necessary. If severe irritation occurs, discontinue use and consult a healthcare professional.

## 2024-11-20 ENCOUNTER — TELEPHONE (OUTPATIENT)
Dept: NEUROLOGY | Facility: CLINIC | Age: 39
End: 2024-11-20
Payer: COMMERCIAL

## 2024-11-20 NOTE — TELEPHONE ENCOUNTER
"----- Message from Katlyn sent at 11/20/2024  9:02 AM CST -----  Regarding: NP PHYSICAL THERAPY NHUNG Garvin Morning/Afternoon,    The Physical/Occupational Therapy Department received your order to get the attached patient scheduled for an evaluation. We have reached out to the patient and scheduled them for an evaluation; however, they have cancelled their appointment.    We have reached out to the patient to get them rescheduled; however, we have been unsuccessful in reaching the patient.    We wanted you to be aware that your patient has not started therapy. If you speak with them again about starting therapy please have them reach out to us at [phone] to get scheduled?.    Best regards,  Katlyn "Ms. Brooks" Baljinder  Access Navigator  [phone] FAX: [phone]  [email or web address]  "

## 2024-11-21 RX ORDER — DOXYCYCLINE HYCLATE 100 MG
100 TABLET ORAL DAILY
Qty: 30 TABLET | Refills: 0 | Status: SHIPPED | OUTPATIENT
Start: 2024-11-21

## 2024-12-09 ENCOUNTER — TELEPHONE (OUTPATIENT)
Dept: OBSTETRICS AND GYNECOLOGY | Facility: CLINIC | Age: 39
End: 2024-12-09
Payer: COMMERCIAL

## 2024-12-09 NOTE — TELEPHONE ENCOUNTER
Spoke with pt to schedule preg confirmation appt with Isatu. LMP 11/05/24. Appt set for 12/11/24 @ 1:30 pm. Pt verbalized understanding.

## 2024-12-09 NOTE — TELEPHONE ENCOUNTER
----- Message from Jayla sent at 12/9/2024  1:30 PM CST -----  Type :  Needs Medical Advice  pt  Who Called: pt  Symptoms (please be specific): pregnant   How long has patient had these symptoms:  pregnant  Pharmacy name and phone #:  no  Would the patient rather a call back or a response via [facility]? call  Best Call Back Number: Click to [phone]  Additional Information: appt pregnant

## 2024-12-11 ENCOUNTER — LAB VISIT (OUTPATIENT)
Dept: LAB | Facility: HOSPITAL | Age: 39
End: 2024-12-11
Attending: FAMILY MEDICINE
Payer: COMMERCIAL

## 2024-12-11 ENCOUNTER — OFFICE VISIT (OUTPATIENT)
Dept: OBSTETRICS AND GYNECOLOGY | Facility: CLINIC | Age: 39
End: 2024-12-11
Payer: COMMERCIAL

## 2024-12-11 VITALS
WEIGHT: 144.19 LBS | BODY MASS INDEX: 28.31 KG/M2 | HEART RATE: 95 BPM | OXYGEN SATURATION: 99 % | SYSTOLIC BLOOD PRESSURE: 118 MMHG | HEIGHT: 60 IN | DIASTOLIC BLOOD PRESSURE: 82 MMHG

## 2024-12-11 DIAGNOSIS — R11.0 NAUSEA: ICD-10-CM

## 2024-12-11 DIAGNOSIS — Z32.00 ENCOUNTER FOR CONFIRMATION OF PREGNANCY TEST RESULT WITH PHYSICAL EXAMINATION: Primary | ICD-10-CM

## 2024-12-11 DIAGNOSIS — Z32.00 ENCOUNTER FOR CONFIRMATION OF PREGNANCY TEST RESULT WITH PHYSICAL EXAMINATION: ICD-10-CM

## 2024-12-11 DIAGNOSIS — O09.521 AMA (ADVANCED MATERNAL AGE) MULTIGRAVIDA 35+, FIRST TRIMESTER: ICD-10-CM

## 2024-12-11 LAB
ABO + RH BLD: NORMAL
ALBUMIN SERPL BCP-MCNC: 4.1 G/DL (ref 3.5–5.2)
ALP SERPL-CCNC: 56 U/L (ref 40–150)
ALT SERPL W/O P-5'-P-CCNC: 26 U/L (ref 10–44)
ANION GAP SERPL CALC-SCNC: 11 MMOL/L (ref 8–16)
AST SERPL-CCNC: 15 U/L (ref 10–40)
B-HCG UR QL: POSITIVE
BASOPHILS # BLD AUTO: 0.02 K/UL (ref 0–0.2)
BASOPHILS NFR BLD: 0.3 % (ref 0–1.9)
BILIRUB SERPL-MCNC: 0.7 MG/DL (ref 0.1–1)
BLD GP AB SCN CELLS X3 SERPL QL: NORMAL
BUN SERPL-MCNC: 7 MG/DL (ref 6–20)
CALCIUM SERPL-MCNC: 9.4 MG/DL (ref 8.7–10.5)
CHLORIDE SERPL-SCNC: 104 MMOL/L (ref 95–110)
CO2 SERPL-SCNC: 21 MMOL/L (ref 23–29)
CREAT SERPL-MCNC: 0.7 MG/DL (ref 0.5–1.4)
CREAT UR-MCNC: 166 MG/DL (ref 15–325)
CTP QC/QA: YES
DIFFERENTIAL METHOD BLD: ABNORMAL
EOSINOPHIL # BLD AUTO: 0 K/UL (ref 0–0.5)
EOSINOPHIL NFR BLD: 0.4 % (ref 0–8)
ERYTHROCYTE [DISTWIDTH] IN BLOOD BY AUTOMATED COUNT: 12.9 % (ref 11.5–14.5)
EST. GFR  (NO RACE VARIABLE): >60 ML/MIN/1.73 M^2
ESTIMATED AVG GLUCOSE: 88 MG/DL (ref 68–131)
GLUCOSE SERPL-MCNC: 104 MG/DL (ref 70–110)
HBA1C MFR BLD: 4.7 % (ref 4.5–6.2)
HCT VFR BLD AUTO: 35.6 % (ref 37–48.5)
HCV AB SERPL QL IA: NEGATIVE
HGB BLD-MCNC: 11.4 G/DL (ref 12–16)
HIV 1+2 AB+HIV1 P24 AG SERPL QL IA: NEGATIVE
IMM GRANULOCYTES # BLD AUTO: 0.01 K/UL (ref 0–0.04)
IMM GRANULOCYTES NFR BLD AUTO: 0.1 % (ref 0–0.5)
LYMPHOCYTES # BLD AUTO: 1.6 K/UL (ref 1–4.8)
LYMPHOCYTES NFR BLD: 21 % (ref 18–48)
MCH RBC QN AUTO: 28 PG (ref 27–31)
MCHC RBC AUTO-ENTMCNC: 32 G/DL (ref 32–36)
MCV RBC AUTO: 88 FL (ref 82–98)
MONOCYTES # BLD AUTO: 0.3 K/UL (ref 0.3–1)
MONOCYTES NFR BLD: 3.6 % (ref 4–15)
NEUTROPHILS # BLD AUTO: 5.8 K/UL (ref 1.8–7.7)
NEUTROPHILS NFR BLD: 74.6 % (ref 38–73)
NRBC BLD-RTO: 0 /100 WBC
PLATELET # BLD AUTO: 267 K/UL (ref 150–450)
PMV BLD AUTO: 9.8 FL (ref 9.2–12.9)
POTASSIUM SERPL-SCNC: 3.6 MMOL/L (ref 3.5–5.1)
PROT SERPL-MCNC: 7.5 G/DL (ref 6–8.4)
PROT UR-MCNC: 10 MG/DL (ref 0–15)
PROT/CREAT UR: 0.06 MG/G{CREAT} (ref 0–0.2)
RBC # BLD AUTO: 4.07 M/UL (ref 4–5.4)
SODIUM SERPL-SCNC: 136 MMOL/L (ref 136–145)
WBC # BLD AUTO: 7.81 K/UL (ref 3.9–12.7)

## 2024-12-11 PROCEDURE — 3079F DIAST BP 80-89 MM HG: CPT | Mod: CPTII,S$GLB,, | Performed by: FAMILY MEDICINE

## 2024-12-11 PROCEDURE — 87389 HIV-1 AG W/HIV-1&-2 AB AG IA: CPT | Performed by: FAMILY MEDICINE

## 2024-12-11 PROCEDURE — 86762 RUBELLA ANTIBODY: CPT | Performed by: FAMILY MEDICINE

## 2024-12-11 PROCEDURE — 87086 URINE CULTURE/COLONY COUNT: CPT | Performed by: FAMILY MEDICINE

## 2024-12-11 PROCEDURE — 86901 BLOOD TYPING SEROLOGIC RH(D): CPT | Performed by: FAMILY MEDICINE

## 2024-12-11 PROCEDURE — 84156 ASSAY OF PROTEIN URINE: CPT | Performed by: FAMILY MEDICINE

## 2024-12-11 PROCEDURE — 1160F RVW MEDS BY RX/DR IN RCRD: CPT | Mod: CPTII,S$GLB,, | Performed by: FAMILY MEDICINE

## 2024-12-11 PROCEDURE — 3074F SYST BP LT 130 MM HG: CPT | Mod: CPTII,S$GLB,, | Performed by: FAMILY MEDICINE

## 2024-12-11 PROCEDURE — 87340 HEPATITIS B SURFACE AG IA: CPT | Performed by: FAMILY MEDICINE

## 2024-12-11 PROCEDURE — 80053 COMPREHEN METABOLIC PANEL: CPT | Performed by: FAMILY MEDICINE

## 2024-12-11 PROCEDURE — 3044F HG A1C LEVEL LT 7.0%: CPT | Mod: CPTII,S$GLB,, | Performed by: FAMILY MEDICINE

## 2024-12-11 PROCEDURE — 99213 OFFICE O/P EST LOW 20 MIN: CPT | Mod: S$GLB,,, | Performed by: FAMILY MEDICINE

## 2024-12-11 PROCEDURE — 83036 HEMOGLOBIN GLYCOSYLATED A1C: CPT | Performed by: FAMILY MEDICINE

## 2024-12-11 PROCEDURE — 86787 VARICELLA-ZOSTER ANTIBODY: CPT | Performed by: FAMILY MEDICINE

## 2024-12-11 PROCEDURE — 86593 SYPHILIS TEST NON-TREP QUANT: CPT | Performed by: FAMILY MEDICINE

## 2024-12-11 PROCEDURE — 36415 COLL VENOUS BLD VENIPUNCTURE: CPT | Performed by: FAMILY MEDICINE

## 2024-12-11 PROCEDURE — 86803 HEPATITIS C AB TEST: CPT | Performed by: FAMILY MEDICINE

## 2024-12-11 PROCEDURE — 3008F BODY MASS INDEX DOCD: CPT | Mod: CPTII,S$GLB,, | Performed by: FAMILY MEDICINE

## 2024-12-11 PROCEDURE — 85025 COMPLETE CBC W/AUTO DIFF WBC: CPT | Performed by: FAMILY MEDICINE

## 2024-12-11 PROCEDURE — 83020 HEMOGLOBIN ELECTROPHORESIS: CPT | Performed by: FAMILY MEDICINE

## 2024-12-11 PROCEDURE — 81025 URINE PREGNANCY TEST: CPT | Mod: S$GLB,,, | Performed by: FAMILY MEDICINE

## 2024-12-11 PROCEDURE — 1159F MED LIST DOCD IN RCRD: CPT | Mod: CPTII,S$GLB,, | Performed by: FAMILY MEDICINE

## 2024-12-11 PROCEDURE — 99999 PR PBB SHADOW E&M-EST. PATIENT-LVL III: CPT | Mod: PBBFAC,,, | Performed by: FAMILY MEDICINE

## 2024-12-11 RX ORDER — PROMETHAZINE HYDROCHLORIDE 12.5 MG/1
12.5 TABLET ORAL EVERY 6 HOURS PRN
Qty: 20 TABLET | Refills: 1 | Status: SHIPPED | OUTPATIENT
Start: 2024-12-11

## 2024-12-11 NOTE — PATIENT INSTRUCTIONS
Have a question or concern?    PRO TIP: Program these phone numbers in your cell phone!    for an emergency  call 911 or go to the nearest hospital Especially after 20 weeks of the pregnancy, please remember that  Labor & Delivery is at [facility].  There is no L&D at [facility] ([facility]   [facility] Nurse Care Advice Line  [phone] At any time during your pregnancy,  you can speak to a nurse 24-7.   for non-urgent issues, send us a  message in [employer] Consider calling the Nurse Care Advice Line if it's a weekend or  toward the end of the work-day since  [employer] and phone messages may not be answered for a day or two.   for non-urgent issues, call the clinic  [phone], Option 3    Labor & Delivery  [phone] Starting at 20 weeks of the pregnancy,  you can speak to a nurse on L&D 24-7.       FIRST TRIMESTER  0 - 13+6 weeks    Adapting to Pregnancy: First Trimester   As your body adjusts, you may have to change or limit your daily activities. You'll need more rest. You may also need to use the energy you have more wisely.     Your Changing Body   Almost every part of your body is affected as you adapt to pregnancy. You may not look very pregnant during the first three months, but you are likely to have some common signs of early pregnancy: Nausea, Fatigue, Frequent urination, Mood swings, Bloating of the abdomen, Nipple or breast tenderness, Breast swelling.    It's Not Too Late to Start Good Habits   What matters most is protecting your baby from this moment on. If you smoke, drink alcohol, or use drugs, now is the time to stop. If you need help, talk with your health care provider.   Smoking increases the risk of miscarriage or having a low-birth-weight baby. If you smoke, quit now.   Alcohol and drugs like marijuana have been linked to miscarriage, birth defects, intellectual disability, and low birth weight. Do not drink alcohol or use drugs.    Tips to Relieve Nausea    There's lots more information in your OB Welcome Packet, but here are a few ideas:  Eat small, light meals at frequent intervals.   Get up slowly. Eat a few unsalted crackers before you get out of bed.   Drink water with lemon slices.   Eat an ice pop in your favorite flavor.   Ask your health care provider about taking gus or vitamin B6 for nausea and vomiting.   Talk with your health care provider if you take vitamins that upset your stomach.    Advice for Travel   Talk to your health care provider first, but the second trimester may be the best time for travel. You may be advised to avoid certain trips while you're pregnant. Food and water can be concerns in developing countries. Travel by car is a good choice since you can stop, get out, and stretch (should be done at least every 2 hours). Bring snacks and water along. Fasten the lap belt below your belly, low over your hips. Also be sure to wear the shoulder harness.   We usually recommend no flying beyond 35 completed weeks (35+6).    Intimacy   Unless your health care provider tells you to, there is no reason to stop having sex while you're pregnant. You or your partner may notice changes in desire. Desire may be less in the first trimester, due to nausea and fatigue. In the second trimester, sex may be very enjoyable. The third trimester can be a challenge comfort-wise. Try different positions and see what's best for you.    Baby!  Major organs and nervous system are developing, the heart starts beating, lungs begin development, bones appear, all the little parts start to form (head, face, eyes, ears, arms, fingers, legs, and toes), hair starts to grow, and 20 tooth-buds develop.   NAUSEA AND VOMITING OF PREGNANCY   Nausea and vomiting of pregnancy (aka Morning Sickness) is an unfortunate but common condition that affects up to 85% of pregnant women.  The good news is that serious cases affect less than 2% of pregnancies, so usually there's no risk of  harm to yourself or your baby.    Here are some guidelines we hope you find helpful.  These guidelines are based on decades of experience and medical research, and they are endorsed by the American College of Obstetricians and Gynecologists (ACOG).  We can't guarantee to make your symptoms go away entirely, but we should be able to make them better!   TIPS AND TRICKS   · Avoid an empty stomach! Eat before you feel hungry or as soon as you feel hungry.  · Avoid an overly full stomach!  Eat slowly and in small amounts every 1-2 hours all day.  · Find out what foods work for you:     -- Try avoiding: coffee, spicy foods, foods with strong smells, high-fat foods, acidic foods, and very sweet foods     -- Try instead: high-protein, salty, low-fat, bland, dry foods (nuts, pretzels, crackers, cereal, toast)  · Try sucking on a peppermint candy after eating.  · Drink fluids at least 30 minutes before or after eating solid food to avoid an overly full stomach.  · Try drinking fluids that are cold, clear, carbonated, and/or sour (ginger ale, lemonade, popsicles).  You can also try adding lemon, mint, or peppermint to water or tea.  · Avoid possible triggers such as stuffy rooms, odors like perfumes or smoke, heat, humidity, noise, motion sickness.  · Don't lie down soon after eating.  · Get enough sleep.  · Brush your teeth after eating, spit out your saliva, and rinse your mouth frequently.  · Avoid vitamins or supplements that contain iron.  · Take your prenatal vitamin at bedtime and with a snack (rather than in the morning or on an empty stomach).   PREVENTIVE MEDICATIONS   · These are available over the counter.  · Take them regularly to keep symptoms at a better baseline.  · Studies show taking a scheduled combination of vitamin B6 and doxylamine reduces nausea and vomiting by 70%!  · These medications have been around for years and are considered safe in pregnancy.  · If the above interventions and these medications  aren't helping you enough, please reach out to us, and we can offer you more options, listed below.    Ginger capsules 250 mg by mouth 4 times a day  Vitamin B6 (pyridoxine) 10-25 mg by mouth every 6 hours  Doxylamine (Unasom) 12.5 mg by mouth 3 times a day   NEXT-STEP MEDICATIONS   · Continue taking the ginger, Vitamin B6, and Doxylamine if they helped (even if they just helped a little).  · In addition, you can take some of the following medications on an as-needed basis.  · A prescription is required for medications marked with an *.    Benadryl (diphenhydramine) 25 mg by mouth every 4-6 hours  Reglan* (metoclopramide) 10 mg by mouth 30 minutes before each meal and at bedtime (should be about every 6 hours)  Phenergan* (promethazine) 25 mg by mouth or rectal suppository every 4-6 hours  Zofran ODT* (ondansetron orally dissolving tablet) 4 mg dissolved in your mouth every 4 hours  Pepcid (famotidine) 20 mg by mouth once or twice a day

## 2024-12-11 NOTE — PROGRESS NOTES
Amara Reis  39 y.o.  MRN  [id number] PATIENT   1985   FINAL EDC:   ___   by ___    Baby: ___    SO Name: Ankur ALLERGIES:    Morphine  Flagyl      GBS: ___  Date: ___ OB PROBLEM LIST:    AMA, 41yo @ delivery    [  ] consider ASA 81mg    [  ] Targeted US    [  ] BPP weekly @ 32wks if >39    [  ] Growth US @ 32wks    [  ] deliver @ 39wks if >39 or IVF   TO-DO THROUGHOUT PREGNANCY:  Depression Screen: ___  Circumcision: _decline__  Rhogam: ___  Flu Shot: ___  TDAP: ___  Infant feeding: _Breast__   Plans for epidural: ___  Classes at hospital: ___  PP contraception: ___  Delivery Consent: ___  TOLAC counseling: ___  BTL counseling: ___  Pediatrician: ___ MEDICATIONS:    PNV  Folic acid  Iron     TODAY'S PROGRESS NOTE    2024    OB INTAKE APPOINTMENT  Amara Reis is a 39 y.o. who presents today for her OB Intake Appointment.    She denies any problems so far besides a little nausea.     PREGNANCY DATING:    Pregnancy test today = positive  LMP 24 ---gives EDC---> 25 ----> 5+1 wks EGA today    Sure LMP: Yes  Prior US done: No  Other information relevant to dating (sure conception date, IVF date, etc.): No     CARRIER SCREENING PREVIOUSLY DONE:  Cystic Fibrosis, Spinal Muscular Atrophy, Fragile X:  Not sure if previously tested  Hemoglobinopathies:  per patient, was previously negative     FAMILY LINEAGE AND HISTORY:  Ashkenazi or North American Druze:  No  Intellectual disability:  Yes -  Brother had down syndrome, and brother with autism  Premature ovarian failure (<40yoa):  No  Cajun or Syriac Latvian:  Yes - Mom    MISCELLANEOUS:  Does the patient have cats? No    MEDICATIONS:  Current Outpatient Medications   Medication Instructions    multivit 47-iron-folate 1-dha (WESCAP-PN DHA) 27 mg iron-1 mg -300 mg Cap 1 tablet, Oral, Daily    prenatal no115/iron/folic acid (PRENATAL 19 ORAL) Take by mouth.    promethazine (PHENERGAN) 12.5 mg, Oral, Every 6 hours PRN  "      --------------------------------------------------------------------------------     ASSESMENT & PLAN:  Pregnancy confirmed today with a positive pregnancy test.  Patient's medical history was obtained today.    A first trimester dating ultrasound was ordered and scheduled (ideally done between 8 and 10wks).  Gave patient our OB Welcome Packet and reviewed its contents.  Our discussion included:  an overview of appointments, hydration / nutrition / weight gain advice, safe OTC medications, what substances and exposures to avoid, vaccine recommendations, advice for morning sickness, dental hygiene advice, recommendations regarding exercise, advice for travel in pregnancy, information about breastfeeding, postpartum birth control, and circumcision, pediatricians in the community, WIC enrollment, how to contact us for concerns or problems during pregnancy, warning or danger signs.  Also provided [facility]'s publication, "Your Pregnancy from A-Z."    Advised patient to enroll in [employer] if not already done.    Medication management:.  Advised patient to start a prenatal vitamin if not already taking.  It should contain 600mcg folic acid, 27mg iron, and 200mg DHA.     Genetic and Carrier Screening options were discussed in detail with the patient.  Once her EDC is confirmed, she may go to [facility] for the test(s) if desired @ 9wks or greater.  She was encouraged to review the detailed information available in the OB Welcome Packet.    The patient was directed to the lab for  Routine OB labs.    PAP smear: up to date    SAB precautions reviewed    Timing of next clinic appointment will be determined by dating ultrasound.  Will send patient a message in [employer].    Helen Caban NP     LABS   INITIAL LABS:    Use LNOB (for Epic auto-fill)  Use LLWOBLABS (for manual entry) OTHER LABS:    Use L28W (for Epic auto-fill)  Use LLWOBLABS (for manual entry)   ULTRASOUNDS   ANATOMY SCAN:    Use LLWOANATOMYSCAN    "   HISTORY   MEDICAL HISTORY:    Pre-pregnancy BMI = 26  HLD  Endometriosis/ PCOS / possible Fibroids SURGICAL HISTORY:    Lesion on nose  Myringotomy with TM tube insertion  GYN ( D&C, Laprarotomy, and Laparoscopy for ovarian cysts)       OBSTETRIC HISTORY   Month/Year Mode of Delivery EGA Wt. 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   Future Appointments   Date Time Provider Department Center   2024  2:35 PM LAB, [facility]   2025  2:30 PM [facility] To [facility]   2025 11:00 AM ULTRASOUND, [facility]   1/15/2025  1:30 PM Gisselle Jim MD [facility]   2025  1:20 PM Hugh Horan MD [facility] O at [facility]   2025  1:20 PM Sejal Guerrero FNP-C [facility] O at [facility]

## 2024-12-12 LAB
HB ELECTROPHORESIS INTERP CANCEL: NORMAL
HB ELECTROPHORESIS INTERPRETATION: NORMAL
HBV SURFACE AG SERPL QL IA: NEGATIVE
HGB A MFR BLD ELPH: 97.1 % (ref 95.8–98)
HGB A2 MFR BLD: 2.9 % (ref 2–3.3)
HGB A2+XXX MFR BLD ELPH: NORMAL %
HGB F MFR BLD: 0 % (ref 0–0.9)
HGB XXX MFR BLD ELPH: NORMAL %
HPLC HB VARIANT: NORMAL
TREPONEMA PALLIDUM IGG+IGM AB [PRESENCE] IN SERUM OR PLASMA BY IMMUNOASSAY: NONREACTIVE

## 2024-12-13 LAB — BACTERIA UR CULT: NORMAL

## 2024-12-14 LAB
RUBV IGG SERPL IA-ACNC: 2.1
RUBV IGG SERPL QL IA: POSITIVE
VZV IGG SER IA-ACNC: 4.1
VZV IGG SER QL IA: POSITIVE

## 2024-12-19 ENCOUNTER — TELEPHONE (OUTPATIENT)
Dept: OBSTETRICS AND GYNECOLOGY | Facility: CLINIC | Age: 39
End: 2024-12-19
Payer: COMMERCIAL

## 2024-12-19 NOTE — TELEPHONE ENCOUNTER
----- Message from Eileen sent at 12/19/2024 11:42 AM CST -----  Regarding: Pt call to request a medical clearance to have her teeth clean and a filling put in and pt states that she is currently at the dentist and needs it sent right away  Type:  Needs Medical Advice    Who Called:Pt call to request a medical clearance to have her teeth clean and a filling put in and pt states that she is currently at the dentist and needs it sent right away   Would the patient rather a call back or a response via [facility]?  Call Back asap  Best Call Back Number: [phone]  Additional Information: Pt states that the medical clearance needs to be faxed [facility] and the fax number is [phone].

## 2024-12-19 NOTE — TELEPHONE ENCOUNTER
Created OB dental clearance letter and notified pt.  Pt voiced understanding and states she rescheduled her appt for tomorrow.  Letter faxed to number provided.

## 2024-12-19 NOTE — LETTER
December 19, 2024    Amara Reis  [address]  [place] [address]              [facility]  [address]  [address]  [place] [address]  Phone: [phone]  Fax: [phone]      To Whom It May Concern:    Ms. Reis is currently under our care for pregnancy.    Any elective dental work should preferably be scheduled during or after the mid-trimester or postpartum.    Dental procedures can be performed with local anesthesia without epinephrine. Recommended antibiotics include penicillins, erythromycin, and cephalosporins. Tylenol #3 or Percocet may be used for pain control. No x-rays are allowed for routine screening. For dental problems, up to four x-rays may be taken with proper abdominal shield.    Sincerely,        Luisa Infante RN

## 2025-01-08 ENCOUNTER — HOSPITAL ENCOUNTER (OUTPATIENT)
Dept: OBSTETRICS AND GYNECOLOGY | Facility: CLINIC | Age: 40
Discharge: HOME | End: 2025-01-08
Payer: COMMERCIAL

## 2025-01-08 DIAGNOSIS — Z32.00 ENCOUNTER FOR CONFIRMATION OF PREGNANCY TEST RESULT WITH PHYSICAL EXAMINATION: ICD-10-CM

## 2025-01-08 DIAGNOSIS — O09.521 AMA (ADVANCED MATERNAL AGE) MULTIGRAVIDA 35+, FIRST TRIMESTER: ICD-10-CM

## 2025-01-08 PROCEDURE — 76801 OB US < 14 WKS SINGLE FETUS: CPT | Mod: 26,,, | Performed by: OBSTETRICS & GYNECOLOGY

## 2025-01-09 ENCOUNTER — PATIENT MESSAGE (OUTPATIENT)
Dept: OBSTETRICS AND GYNECOLOGY | Facility: CLINIC | Age: 40
End: 2025-01-09
Payer: COMMERCIAL

## 2025-01-09 ENCOUNTER — TELEPHONE (OUTPATIENT)
Dept: OBSTETRICS AND GYNECOLOGY | Facility: CLINIC | Age: 40
End: 2025-01-09
Payer: COMMERCIAL

## 2025-01-09 DIAGNOSIS — Z32.00 ENCOUNTER FOR CONFIRMATION OF PREGNANCY TEST RESULT WITH PHYSICAL EXAMINATION: Primary | ICD-10-CM

## 2025-01-09 NOTE — TELEPHONE ENCOUNTER
----- Message from Familia sent at 1/9/2025  3:04 PM CST -----  Type:  Patient Returning Call    Who Called:  pt  Who Left Message for Patient:  Marilin  Does the patient know what this is regarding?:  Yes  Best Call Back Number:  [phone]  Additional Information:  Please call back to advise Thanks!

## 2025-01-09 NOTE — TELEPHONE ENCOUNTER
LVM in regards to scheduling repeat u/s to see progression. Pt was requested to give the office a call back.

## 2025-01-09 NOTE — TELEPHONE ENCOUNTER
Spoke with patient in regards to scheduling repeat u/s in 10-14 days.   Pt has been scheduled for 1/21/25 at 10am for u/s.  Pt verbalized understanding.

## 2025-01-15 ENCOUNTER — INITIAL PRENATAL (OUTPATIENT)
Dept: OBSTETRICS AND GYNECOLOGY | Facility: CLINIC | Age: 40
End: 2025-01-15
Payer: COMMERCIAL

## 2025-01-15 ENCOUNTER — LAB VISIT (OUTPATIENT)
Dept: LAB | Facility: HOSPITAL | Age: 40
End: 2025-01-15
Attending: OBSTETRICS & GYNECOLOGY
Payer: COMMERCIAL

## 2025-01-15 VITALS
BODY MASS INDEX: 31.39 KG/M2 | HEART RATE: 93 BPM | WEIGHT: 160.69 LBS | DIASTOLIC BLOOD PRESSURE: 70 MMHG | SYSTOLIC BLOOD PRESSURE: 114 MMHG

## 2025-01-15 DIAGNOSIS — O36.80X1 PREGNANCY WITH INCONCLUSIVE FETAL VIABILITY, FETUS 1 OF MULTIPLE GESTATION: ICD-10-CM

## 2025-01-15 DIAGNOSIS — O36.80X1 PREGNANCY WITH INCONCLUSIVE FETAL VIABILITY, FETUS 1 OF MULTIPLE GESTATION: Primary | ICD-10-CM

## 2025-01-15 LAB — HCG INTACT+B SERPL-ACNC: NORMAL MIU/ML

## 2025-01-15 PROCEDURE — 36415 COLL VENOUS BLD VENIPUNCTURE: CPT | Mod: PO | Performed by: OBSTETRICS & GYNECOLOGY

## 2025-01-15 PROCEDURE — 0500F INITIAL PRENATAL CARE VISIT: CPT | Mod: S$GLB,,, | Performed by: OBSTETRICS & GYNECOLOGY

## 2025-01-15 PROCEDURE — 99999 PR PBB SHADOW E&M-EST. PATIENT-LVL II: CPT | Mod: PBBFAC,,, | Performed by: OBSTETRICS & GYNECOLOGY

## 2025-01-15 PROCEDURE — 84702 CHORIONIC GONADOTROPIN TEST: CPT | Performed by: OBSTETRICS & GYNECOLOGY

## 2025-01-15 NOTE — PROGRESS NOTES
Amara Reis  39 y.o.  MRN  [id number] PATIENT   1985   FINAL EDC:   ___   by ___    Baby: ___    SO Name: Ankur ALLERGIES:    Morphine  Flagyl      GBS: ___  Date: ___ OB PROBLEM LIST:    AMA, 41yo @ delivery    [  ] consider ASA 81mg    [  ] Targeted US    [  ] BPP weekly @ 32wks if >39    [  ] Growth US @ 32wks    [  ] deliver @ 39wks if >39 or IVF   TO-DO THROUGHOUT PREGNANCY:  Depression Screen: ___  Circumcision: _decline__  Rhogam: ___  Flu Shot: ___  TDAP: ___  Infant feeding: _Breast__   Plans for epidural: ___  Classes at hospital: ___  PP contraception: ___  Delivery Consent: ___  TOLAC counseling: ___  BTL counseling: ___  Pediatrician: ___ MEDICATIONS:    PNV  Folic acid  Iron     TODAY'S PROGRESS NOTE    01/15/2025    OB INTAKE APPOINTMENT  Amara Reis is a 39 y.o. who presents today for her OB Intake Appointment.    She denies any problems so far besides a little nausea.     PREGNANCY DATING:    Pregnancy test today = positive  LMP 24 ---gives EDC---> 25 ----> 5+1 wks EGA today    Sure LMP: Yes  Prior US done: No  Other information relevant to dating (sure conception date, IVF date, etc.): No     CARRIER SCREENING PREVIOUSLY DONE:  Cystic Fibrosis, Spinal Muscular Atrophy, Fragile X:  Not sure if previously tested  Hemoglobinopathies:  per patient, was previously negative     FAMILY LINEAGE AND HISTORY:  Ashkenazi or North American Faith:  No  Intellectual disability:  Yes -  Brother had down syndrome, and brother with autism  Premature ovarian failure (<40yoa):  No  Cajun or Telugu Maldivian:  Yes - Mom    MISCELLANEOUS:  Does the patient have cats? No    MEDICATIONS:  Current Outpatient Medications   Medication Instructions    multivit 47-iron-folate 1-dha (WESCAP-PN DHA) 27 mg iron-1 mg -300 mg Cap 1 tablet, Oral, Daily    prenatal no115/iron/folic acid (PRENATAL 19 ORAL) Take by mouth.    promethazine (PHENERGAN) 12.5 mg, Oral, Every 6 hours PRN  "      --------------------------------------------------------------------------------     ASSESMENT & PLAN:  Pregnancy confirmed today with a positive pregnancy test.  Patient's medical history was obtained today.    A first trimester dating ultrasound was ordered and scheduled (ideally done between 8 and 10wks).  Gave patient our OB Welcome Packet and reviewed its contents.  Our discussion included:  an overview of appointments, hydration / nutrition / weight gain advice, safe OTC medications, what substances and exposures to avoid, vaccine recommendations, advice for morning sickness, dental hygiene advice, recommendations regarding exercise, advice for travel in pregnancy, information about breastfeeding, postpartum birth control, and circumcision, pediatricians in the community, WIC enrollment, how to contact us for concerns or problems during pregnancy, warning or danger signs.  Also provided [facility]'s publication, "Your Pregnancy from A-Z."    Advised patient to enroll in [employer] if not already done.    Medication management:.  Advised patient to start a prenatal vitamin if not already taking.  It should contain 600mcg folic acid, 27mg iron, and 200mg DHA.     Genetic and Carrier Screening options were discussed in detail with the patient.  Once her EDC is confirmed, she may go to [facility] for the test(s) if desired @ 9wks or greater.  She was encouraged to review the detailed information available in the OB Welcome Packet.    The patient was directed to the lab for  Routine OB labs.    PAP smear: up to date    SAB precautions reviewed    Timing of next clinic appointment will be determined by dating ultrasound.  Will send patient a message in [employer].    Krishna Mendoza MD     LABS   INITIAL LABS:    Use LNOB (for Epic auto-fill)  Use LLWOBLABS (for manual entry) OTHER LABS:    Use L28W (for Epic auto-fill)  Use LLWOBLABS (for manual entry)   ULTRASOUNDS   ANATOMY SCAN:    Use LLWOANATOMYSCAN      HISTORY "   MEDICAL HISTORY:    Pre-pregnancy BMI = 26  HLD  Endometriosis/ PCOS / possible Fibroids SURGICAL HISTORY:    Lesion on nose  Myringotomy with TM tube insertion  GYN ( D&C, Laprarotomy, and Laparoscopy for ovarian cysts)       OBSTETRIC HISTORY   Month/Year Mode of Delivery EGA Wt. 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  Education Level: Some College  Occupation: homemaker  Jewish: non denomenation GYN HISTORY:    PAP'S: remote h/o abnormal & cleared to routine surveillance  LAST PAP: PAP neg / HPV neg / Date: 11/3/2024  STD Hx: no past history  GENITAL HSV: no FAMILY HISTORY:    HTN: yes (mom and dad)  DIABETES: no  BLEEDING D/O: no  CLOTTING D/O: no  BIRTH DEFECTS: no  MENTAL DISABILITY: yes (sibling)  GYN CANCER: no  She is one of 13 children  Brother had down syndrome, 2 holes in heart,  RSV  Brother has autism       US OB/GYN Procedure (Viewpoint) - Extended List  Study Date: 2025  Patient Info    Amara Reis (39 y.o.)   History:     Providers    Ordering provider: Helen Caban NP        Result Information    Status: Final result (Collected: 2025 10:57) Provider Status: Reviewed    Reviewed By    Marilin Gil MA on 2025 16:12   Helen Caban NP on 2025 11:46      Ty Result Report     Display PDF report for US MFM Procedure(Viewpoint)     US OB/GYN Procedure (Viewpoint) - Extended List (Order [id number])  Result Narrative      Indication  ========  Indication: Estimation of Gestational Age  Indication: Advanced Maternal Age (AMA),  Multigravida    History  ======  Previous Outcomes   5  Para 3    Method  ======  Transabdominal and transvaginal ultrasound examination. View: Sufficient    Pregnancy  =========  Clakr pregnancy. Number of embryos: 1    Dating  ======  LMP on: 2024  GA by LMP 9 w + 1 d  GERARD by LMP: 2025  Ultrasound examination on: 2025  GA by U/S based upon: CRL  GA by U/S 6 w + 2 d  GERARD by U/S: 2025  Assigned: based on ultrasound (CRL), selected on 2025  Assigned GA 6 w + 2 d  Assigned GERARD: 2025    Assessment  ==========  Gestational sac: visualized  GS 21.9 mm 7w 1d Rempen  Location: intrauterine  Yolk sac: visualized  Amniotic sac: visualized  Embryo: visualized  CRL 5.6 mm 6w 2d Hadlock  Cardiac activity: absent  Other: Subchorionic hemorrhage 17 x 9 x 7 mm    Maternal Structures  ===============  Uterus / Cervix  Uterus: Normal  Fibroids: Fibroids identified  Uterine fibroid D1 8 mm  Uterine fibroid D2 8 mm  Uterine fibroid D3 10 mm  Uterine fibroid mean 8.7 mm  Uterine fibroid vol 0.335 cmï¿½  Uterine fibroids findings: Subserous  Cervix: Visualized  Approach: Transvaginal  Ovaries / Tubes / Adnexa  Rt ovary: Normal  Rt ovary D1 35 mm  Rt ovary D2 19 mm  Rt ovary D3 31 mm  Rt ovary Vol 10.7 cmï¿½  Lt ovary: Normal  Lt ovary D1 22 mm  Lt ovary D2 15 mm  Lt ovary D3 28 mm  Lt ovary Vol 4.6 cmï¿½  Cul de Sac / Bladder / Kidneys / Other  Free fluid: No free fluid visualized    Impression  =========  A clark IUP is identified. No embryonic cardiac activity is identified; however, when the CRL measures under 7 mm, this may not be a pathologic finding.  The tentative diagnosis, based on ultrasound findings alone, is an IUP of uncertain prognosis. Other clinical data, as available, should be used in conjunction with  ultrasound findings to make a final determination regarding pregnancy prognosis.  An area of hemorrhage was seen in the subchorionic region of the placenta. A fibroid uterus as  described above was seen.  The maternal adnexae are normal in appearance. The amount of free fluid seen in the pelvis with within the normal physiologic range.    Recommendation

## 2025-01-16 ENCOUNTER — TELEPHONE (OUTPATIENT)
Dept: OBSTETRICS AND GYNECOLOGY | Facility: CLINIC | Age: 40
End: 2025-01-16
Payer: COMMERCIAL

## 2025-01-16 ENCOUNTER — HOSPITAL ENCOUNTER (EMERGENCY)
Facility: HOSPITAL | Age: 40
Discharge: HOME | End: 2025-01-17
Attending: EMERGENCY MEDICINE
Payer: COMMERCIAL

## 2025-01-16 DIAGNOSIS — O03.9 SPONTANEOUS MISCARRIAGE: Primary | ICD-10-CM

## 2025-01-16 DIAGNOSIS — O36.80X1 PREGNANCY WITH INCONCLUSIVE FETAL VIABILITY, FETUS 1 OF MULTIPLE GESTATION: Primary | ICD-10-CM

## 2025-01-16 LAB
B-HCG UR QL: POSITIVE
BASOPHILS # BLD AUTO: 0.03 K/UL (ref 0–0.2)
BASOPHILS NFR BLD: 0.4 % (ref 0–1.9)
BILIRUB UR QL STRIP: NEGATIVE
CLARITY UR: CLEAR
COLOR UR: YELLOW
CTP QC/QA: YES
DIFFERENTIAL METHOD BLD: ABNORMAL
EOSINOPHIL # BLD AUTO: 0.1 K/UL (ref 0–0.5)
EOSINOPHIL NFR BLD: 1.4 % (ref 0–8)
ERYTHROCYTE [DISTWIDTH] IN BLOOD BY AUTOMATED COUNT: 13.5 % (ref 11.5–14.5)
GLUCOSE UR QL STRIP: NEGATIVE
HCT VFR BLD AUTO: 34.9 % (ref 37–48.5)
HGB BLD-MCNC: 11.3 G/DL (ref 12–16)
HGB UR QL STRIP: ABNORMAL
IMM GRANULOCYTES # BLD AUTO: 0.03 K/UL (ref 0–0.04)
IMM GRANULOCYTES NFR BLD AUTO: 0.4 % (ref 0–0.5)
KETONES UR QL STRIP: NEGATIVE
LEUKOCYTE ESTERASE UR QL STRIP: NEGATIVE
LYMPHOCYTES # BLD AUTO: 1.8 K/UL (ref 1–4.8)
LYMPHOCYTES NFR BLD: 21.8 % (ref 18–48)
MCH RBC QN AUTO: 29 PG (ref 27–31)
MCHC RBC AUTO-ENTMCNC: 32.4 G/DL (ref 32–36)
MCV RBC AUTO: 90 FL (ref 82–98)
MICROSCOPIC COMMENT: ABNORMAL
MONOCYTES # BLD AUTO: 0.5 K/UL (ref 0.3–1)
MONOCYTES NFR BLD: 5.6 % (ref 4–15)
NEUTROPHILS # BLD AUTO: 5.9 K/UL (ref 1.8–7.7)
NEUTROPHILS NFR BLD: 70.4 % (ref 38–73)
NITRITE UR QL STRIP: NEGATIVE
NRBC BLD-RTO: 0 /100 WBC
PH UR STRIP: 6 [PH] (ref 5–8)
PLATELET # BLD AUTO: 232 K/UL (ref 150–450)
PMV BLD AUTO: 9.5 FL (ref 9.2–12.9)
PROT UR QL STRIP: ABNORMAL
RBC # BLD AUTO: 3.89 M/UL (ref 4–5.4)
RBC #/AREA URNS HPF: 85 /HPF (ref 0–4)
SP GR UR STRIP: 1.02 (ref 1–1.03)
SQUAMOUS #/AREA URNS HPF: 1 /HPF
URN SPEC COLLECT METH UR: ABNORMAL
UROBILINOGEN UR STRIP-ACNC: NEGATIVE EU/DL
WBC # BLD AUTO: 8.32 K/UL (ref 3.9–12.7)
WBC #/AREA URNS HPF: 1 /HPF (ref 0–5)

## 2025-01-16 PROCEDURE — 85025 COMPLETE CBC W/AUTO DIFF WBC: CPT | Performed by: EMERGENCY MEDICINE

## 2025-01-16 PROCEDURE — 84702 CHORIONIC GONADOTROPIN TEST: CPT | Performed by: EMERGENCY MEDICINE

## 2025-01-16 PROCEDURE — 80053 COMPREHEN METABOLIC PANEL: CPT | Performed by: EMERGENCY MEDICINE

## 2025-01-16 PROCEDURE — 81001 URINALYSIS AUTO W/SCOPE: CPT | Performed by: EMERGENCY MEDICINE

## 2025-01-16 PROCEDURE — 99284 EMERGENCY DEPT VISIT MOD MDM: CPT | Mod: 25

## 2025-01-16 PROCEDURE — 25000003 PHARM REV CODE 250: Performed by: EMERGENCY MEDICINE

## 2025-01-16 PROCEDURE — 81025 URINE PREGNANCY TEST: CPT | Performed by: EMERGENCY MEDICINE

## 2025-01-16 RX ORDER — ACETAMINOPHEN 500 MG
1000 TABLET ORAL
Status: COMPLETED | OUTPATIENT
Start: 2025-01-16 | End: 2025-01-16

## 2025-01-16 RX ADMIN — ACETAMINOPHEN 1000 MG: 500 TABLET, FILM COATED ORAL at 10:01

## 2025-01-16 NOTE — PROGRESS NOTES
[facility] Note      Subjective:   Chief Complaint:  Initial Prenatal Visit (/)      Date: 1/15/2025     Patient ID: Amara Reis is a  39 y.o. female.    Contraception: None    Patient's last menstrual period was 2024 (exact date).    The patient presents today due to the following:  She was last seen by Bernice Caban NP for an OB intake appointment.  [as the patient is new to me her previous office note and chart were reviewed.]  She had a last menstrual period of 2024 but admits to somewhat irregular menses.  She does track her menses.  This would give her a due date of 2025.    On 2025 she had an ultrasound which noted a gestational sac with fetal pole but fetal cardiac activity was not yet visualized.  In addition a 17 x 9 x 7 mm subchorionic hemorrhage as well as uterine myoma were noted.    She denies any pelvic pain and no vaginal bleeding.  No significant nausea but she is having some breast tenderness.      GYN & OB History  LMP: Patient's last menstrual period was 2024 (exact date).     Age of Menarche:13  Age at first pregnancy:18   Age at first live birth:22  Number of months breastfeeding:    Age at Menopause:    Comments:     OB History          6    Para   3    Term   3            AB   2    Living   3         SAB   2    IAB        Ectopic        Multiple   0    Live Births   3                 Allergies:   Review of patient's allergies indicates:   Allergen Reactions    Morphine Hives, Itching and Nausea And Vomiting    Flagyl [metronidazole hcl] Other (See Comments)     Oral form only--burns her   stomach. Take IV only.  Had to have surgery to fix this       Past Medical History:   Diagnosis Date    Anemia     Clostridium difficile colitis     hx--reoccurs with antibiotic use    Hypertrophy of nasal turbinates 2019       Past Surgical History:   Procedure Laterality Date    DILATION AND CURETTAGE OF  UTERUS      EXCISION OF LESION OF NOSE Bilateral 02/18/2019    Procedure: EXCISION, LESION, NOSE;  Surgeon: Chadd Ward MD;  Location: [facility] OR;  Service: ENT;  Laterality: Bilateral;    LAPAROSCOPY      Laparoscopy x 12 for ovarian cysts, endometriosis, laser vaporization of uterosacral nerve complexes (7/26/2013)    LAPAROTOMY      laparotomy for possible myomectomy    MYRINGOTOMY WITH INSERTION OF VENTILATION TUBE Right 02/18/2019    Procedure: MYRINGOTOMY, WITH TYMPANOSTOMY TUBE INSERTION;  Surgeon: Chadd Ward MD;  Location: [facility] OR;  Service: ENT;  Laterality: Right;       Medications    Current Outpatient Medications:     multivit 47-iron-folate 1-dha (WESCAP-PN DHA) 27 mg iron-1 mg -300 mg Cap, Take 1 tablet by mouth once daily., Disp: 30 capsule, Rfl: 5    prenatal no115/iron/folic acid (PRENATAL 19 ORAL), Take by mouth., Disp: , Rfl:     promethazine (PHENERGAN) 12.5 MG Tab, Take 1 tablet (12.5 mg total) by mouth every 6 (six) hours as needed (nausea)., Disp: 20 tablet, Rfl: 1     Social History     Tobacco Use    Smoking status: Never     Passive exposure: Never    Smokeless tobacco: Never   Substance Use Topics    Alcohol use: No     Comment: rarely    Drug use: Never       Family History   Problem Relation Name Age of Onset    Hypertension Mother Candy mccauley     Miscarriages / Stillbirths Mother Candy mccauley     Fibromyalgia Mother Candy mccauley     Hypertension Father Philippe mccauley     Thyroid disease Brother      Breast cancer Neg Hx      Colon cancer Neg Hx         Review of Systems (at today's evaluation)  Review of Systems   Constitutional:  Negative for fever and unexpected weight change.   Respiratory:  Negative for cough and shortness of breath.    Cardiovascular:  Negative for chest pain and palpitations.   Gastrointestinal:  Negative for constipation, diarrhea, nausea and vomiting.   Genitourinary:  Negative for dysuria, frequency, hematuria and urgency.        Gyn as per HPI    Integumentary:  Positive for breast tenderness.   Neurological:  Negative for headaches.   Breast: Positive for tenderness.       Objective:      Vitals:    01/15/25 1345   BP: 114/70   Pulse: 93     Physical Exam:   Constitutional: She appears well-developed and well-nourished. No distress.    HENT:   Head: Normocephalic.     Neck: No thyroid mass present.    Cardiovascular:  Normal rate.             Pulmonary/Chest: Effort normal. No respiratory distress.        Abdominal: Soft. There is no abdominal tenderness.   With the use of the V scan hand-held ultrasound transabdominal ultrasound was performed which noted a gestational sac with a visible fetal pole but fetal cardiac activity could not be confirmed with the hand-held ultrasound.             Musculoskeletal: Normal range of motion.      Right lower leg: No edema.      Left lower leg: No edema.       Neurological: She is alert.   No gross lesions noted.    Skin: Skin is warm and dry.    Psychiatric: She has a normal mood and affect. Her speech is normal and behavior is normal. Mood normal.         Recent Radiology Results:      US OB/GYN Procedure 2025    Indication  ========  Indication: Estimation of Gestational Age  Indication: Advanced Maternal Age (AMA), Multigravida    History  ======  Previous Outcomes   5  Para 3    Pregnancy  =========  Clark pregnancy. Number of embryos: 1    Dating  ======  LMP on: 2024  GA by LMP 9 w + 1 d  GERARD by LMP: 2025  Ultrasound examination on: 2025  GA by U/S based upon: CRL  GA by U/S 6 w + 2 d  GERARD by U/S: 2025  Assigned: based on ultrasound (CRL), selected on 2025  Assigned GA 6 w + 2 d  Assigned GERARD: 2025    Assessment  ==========  Gestational sac: visualized  GS 21.9 mm 7w 1d Rempen  Location: intrauterine  Yolk sac: visualized  Amniotic sac: visualized  Embryo: visualized  CRL 5.6 mm 6w 2d Hadlock  Cardiac activity: absent  Other: Subchorionic hemorrhage 17 x 9 x 7  mm    Maternal Structures  ===============  Uterus / Cervix  Uterus: Normal  Fibroids: Fibroids identified  Uterine fibroid D1 8 mm  Uterine fibroid D2 8 mm  Uterine fibroid D3 10 mm  Uterine fibroid mean 8.7 mm  Uterine fibroid vol 0.335 cmï¿½  Uterine fibroids findings: Subserous  Cervix: Visualized  Approach: Transvaginal  Ovaries / Tubes / Adnexa  Rt ovary: Normal  Rt ovary D1 35 mm  Rt ovary D2 19 mm  Rt ovary D3 31 mm  Rt ovary Vol 10.7 cmï¿½  Lt ovary: Normal  Lt ovary D1 22 mm  Lt ovary D2 15 mm  Lt ovary D3 28 mm  Lt ovary Vol 4.6 cmï¿½  Cul de Sac / Bladder / Kidneys / Other  Free fluid: No free fluid visualized    Impression  =========  A upton IUP is identified. No embryonic cardiac activity is identified; however, when the CRL measures under 7 mm, this may not be a pathologic finding.  The tentative diagnosis, based on ultrasound findings alone, is an IUP of uncertain prognosis. Other clinical data, as available, should be used in conjunction with  ultrasound findings to make a final determination regarding pregnancy prognosis.  An area of hemorrhage was seen in the subchorionic region of the placenta. A fibroid uterus as described above was seen.  The maternal adnexae are normal in appearance. The amount of free fluid seen in the pelvis with within the normal physiologic range.      Assessment:        1. Pregnancy with inconclusive fetal viability, fetus 1 of multiple gestation        Plan:      Pregnancy with inconclusive fetal viability, fetus 1 of multiple gestation  -     Cancel: HCG, Quantitative; Future; Expected date: 01/15/2025  -     HCG, Quantitative; Future; Expected date: 01/15/2025       No follow-ups on file.     The above was reviewed discussed with the patient.  We discussed the patient's advanced age findings on previous ultrasound and current findings situation.    Options reviewed and rather than initiate obstetrical care at this time we will confirm a viable intrauterine  pregnancy.      At this time will proceed as follows:   Blood for beta-hCG will be obtained   We will order a pelvic ultrasound to confirm viability based on the results of the beta-hCG.    The patient's questions were answered, and she is in agreement with the current plan.     Krishna Mendoza MD  Department OBGYN [facility]

## 2025-01-16 NOTE — TELEPHONE ENCOUNTER
Pt sent message stating she started noticing brown when wiping yesterday, and dark red today when wiping. Pt was scheduled for ultrasound Tuesday 1/21. Rescheduled pt to tomorrow for fetal viability tomorrow at 1:15, along with labs for HCG. Pt voiced understanding.

## 2025-01-17 VITALS
BODY MASS INDEX: 31.25 KG/M2 | SYSTOLIC BLOOD PRESSURE: 114 MMHG | WEIGHT: 160 LBS | OXYGEN SATURATION: 99 % | RESPIRATION RATE: 19 BRPM | HEART RATE: 83 BPM | TEMPERATURE: 98 F | DIASTOLIC BLOOD PRESSURE: 67 MMHG

## 2025-01-17 LAB
ALBUMIN SERPL BCP-MCNC: 4.5 G/DL (ref 3.5–5.2)
ALP SERPL-CCNC: 44 U/L (ref 55–135)
ALT SERPL W/O P-5'-P-CCNC: 24 U/L (ref 10–44)
ANION GAP SERPL CALC-SCNC: 7 MMOL/L (ref 8–16)
AST SERPL-CCNC: 17 U/L (ref 10–40)
BILIRUB SERPL-MCNC: 0.3 MG/DL (ref 0.1–1)
BUN SERPL-MCNC: 13 MG/DL (ref 6–20)
CALCIUM SERPL-MCNC: 9.5 MG/DL (ref 8.7–10.5)
CHLORIDE SERPL-SCNC: 104 MMOL/L (ref 95–110)
CO2 SERPL-SCNC: 26 MMOL/L (ref 23–29)
CREAT SERPL-MCNC: 0.6 MG/DL (ref 0.5–1.4)
EST. GFR  (NO RACE VARIABLE): >60 ML/MIN/1.73 M^2
GLUCOSE SERPL-MCNC: 93 MG/DL (ref 70–110)
HCG INTACT+B SERPL-ACNC: 9329.34 MIU/ML
POTASSIUM SERPL-SCNC: 3.8 MMOL/L (ref 3.5–5.1)
PROT SERPL-MCNC: 7.5 G/DL (ref 6–8.4)
SODIUM SERPL-SCNC: 137 MMOL/L (ref 136–145)

## 2025-01-17 NOTE — ED PROVIDER NOTES
Encounter Date: 2025       History     Chief Complaint   Patient presents with    Vaginal Bleeding     Diagnosed with subchoriaonic hemorrhage; 10 week pregnancy. Spotting began yesterday with passing clots today. PT states felt light headed and decided to come get checked out.      HPI  Pt  currently estimated 7wks3d by u/s performed on 24 (although LMP 24 which would be 10w1d) presents to ED with concern for vaginal spotting that began yesterday described as dark brown blood on tissue with wiping and passing a very large clot tonight prompting ED visit.  Patient states that she originally had an ultrasound on  which per chart review, showed a gestational sac with fetal pole but no fetal cardiac activity as well as a subchorionic hemorrhage and uterine myoma.  Had clinic visit yesterday and had limited bedside transabdominal ultrasound which again noted a gestational sac without fetal cardiac activity.  Patient states she was scheduled to come and have a lab draw and formal ultrasound tomorrow.  She is denying any significant abdominal pain or other complaints.  She does request Tylenol for headache.  She also felt lightheaded after seeing blood clot earlier.    Review of patient's allergies indicates:   Allergen Reactions    Morphine Hives, Itching and Nausea And Vomiting    Flagyl [metronidazole hcl] Other (See Comments)     Oral form only--burns her   stomach. Take IV only.  Had to have surgery to fix this     Past Medical History:   Diagnosis Date    Anemia     Clostridium difficile colitis     hx--reoccurs with antibiotic use    Hypertrophy of nasal turbinates 2019     Past Surgical History:   Procedure Laterality Date    DILATION AND CURETTAGE OF UTERUS      EXCISION OF LESION OF NOSE Bilateral 2019    Procedure: EXCISION, LESION, NOSE;  Surgeon: Chadd Ward MD;  Location: [facility];  Service: ENT;  Laterality: Bilateral;    LAPAROSCOPY      Laparoscopy x 12 for  ovarian cysts, endometriosis, laser vaporization of uterosacral nerve complexes (7/26/2013)    LAPAROTOMY      laparotomy for possible myomectomy    MYRINGOTOMY WITH INSERTION OF VENTILATION TUBE Right 02/18/2019    Procedure: MYRINGOTOMY, WITH TYMPANOSTOMY TUBE INSERTION;  Surgeon: Chadd Ward MD;  Location: [facility] OR;  Service: ENT;  Laterality: Right;     Family History   Problem Relation Name Age of Onset    Hypertension Mother Candy mccauley     Miscarriages / Stillbirths Mother Candy mccauley     Fibromyalgia Mother Candy mccauley     Hypertension Father Philippe mccauley     Thyroid disease Brother      Breast cancer Neg Hx      Colon cancer Neg Hx       Social History     Tobacco Use    Smoking status: Never     Passive exposure: Never    Smokeless tobacco: Never   Substance Use Topics    Alcohol use: No     Comment: rarely    Drug use: Never     Review of Systems   Constitutional:  Negative for fever.   HENT:  Negative for sore throat.    Respiratory:  Negative for shortness of breath.    Cardiovascular:  Negative for chest pain.   Gastrointestinal:  Negative for nausea.   Genitourinary:  Positive for vaginal bleeding. Negative for dysuria.   Musculoskeletal:  Negative for back pain.   Skin:  Negative for rash.   Neurological:  Positive for light-headedness. Negative for weakness.   Hematological:  Does not bruise/bleed easily.       Physical Exam     Initial Vitals [01/16/25 2151]   BP Pulse Resp Temp SpO2   131/82 87 19 98 °F (36.7 °C) 99 %      MAP       --         Physical Exam    Nursing note and vitals reviewed.  Constitutional: She appears well-developed and well-nourished. No distress.   HENT:   Head: Normocephalic and atraumatic. Mouth/Throat: Oropharynx is clear and moist.   Eyes: EOM are normal. Pupils are equal, round, and reactive to light.   Neck: Neck supple.   Normal range of motion.  Cardiovascular:  Normal rate and regular rhythm.           Pulmonary/Chest: Breath sounds normal. No respiratory  distress.   Abdominal: Abdomen is soft. There is no abdominal tenderness. There is no rebound and no guarding.   Musculoskeletal:         General: No tenderness or edema. Normal range of motion.      Cervical back: Normal range of motion and neck supple.     Neurological: She is alert and oriented to person, place, and time. She has normal strength. No cranial nerve deficit. GCS score is 15. GCS eye subscore is 4. GCS verbal subscore is 5. GCS motor subscore is 6.   Skin: Skin is warm and dry. Capillary refill takes less than 2 seconds. No rash noted.   Psychiatric: She has a normal mood and affect. Thought content normal.         ED Course   Procedures  Labs Reviewed   CBC W/ AUTO DIFFERENTIAL - Abnormal       Result Value    WBC 8.32      RBC 3.89 (*)     Hemoglobin 11.3 (*)     Hematocrit 34.9 (*)     MCV 90      MCH 29.0      MCHC 32.4      RDW 13.5      Platelets 232      MPV 9.5      Immature Granulocytes 0.4      Gran # (ANC) 5.9      Immature Grans (Abs) 0.03      Lymph # 1.8      Mono # 0.5      Eos # 0.1      Baso # 0.03      nRBC 0      Gran % 70.4      Lymph % 21.8      Mono % 5.6      Eosinophil % 1.4      Basophil % 0.4      Differential Method Automated      Narrative:     Release to patient->Immediate   COMPREHENSIVE METABOLIC PANEL - Abnormal    Sodium 137      Potassium 3.8      Chloride 104      CO2 26      Glucose 93      BUN 13      Creatinine 0.6      Calcium 9.5      Total Protein 7.5      Albumin 4.5      Total Bilirubin 0.3      Alkaline Phosphatase 44 (*)     AST 17      ALT 24      eGFR >60.0      Anion Gap 7 (*)     Narrative:     Release to patient->Immediate   URINALYSIS, REFLEX TO URINE CULTURE - Abnormal    Specimen UA Urine, Clean Catch      Color, UA Yellow      Appearance, UA Clear      pH, UA 6.0      Specific Gravity, UA 1.025      Protein, UA Trace (*)     Glucose, UA Negative      Ketones, UA Negative      Bilirubin (UA) Negative      Occult Blood UA 3+ (*)     Nitrite, UA  Negative      Urobilinogen, UA Negative      Leukocytes, UA Negative      Narrative:     Specimen Source->Urine   URINALYSIS MICROSCOPIC - Abnormal    RBC, UA 85 (*)     WBC, UA 1      Squam Epithel, UA 1      Microscopic Comment SEE COMMENT      Narrative:     Specimen Source->Urine   POCT URINE PREGNANCY - Abnormal    POC Preg Test, Ur Positive (*)      Acceptable Yes     HCG, QUANTITATIVE    HCG Quant 9329.34      Narrative:     Release to patient->Immediate          Imaging Results              US OB <14 Wks, TransAbd, Single Gestation (Final result)  Result time 25 01:05:05      Final result by Yohan Esqueda MD (25 01:05:05)                   Impression:      No intrauterine pregnancy. Correlate for normal early intrauterine pregnancy, failed intrauterine pregnancy, or ectopic pregnancy. Recommend continued follow-up with ultrasound and correlation with beta HCG levels.      Electronically signed by: Yohan Esqueda  Date:    2025  Time:    01:05               Narrative:    EXAMINATION:  US OB <14 WEEKS TRANSABDOM, SINGLE GESTATION    CLINICAL HISTORY:  Threatened     TECHNIQUE:  First trimester obstetrical ultrasound    COMPARISON:  None    FINDINGS:  No intrauterine pregnancy.  Endometrial thickness is 5 mm.  Uterus measures 8.5 x 5.4 x 7.0 cm.    Ovaries are unremarkable.    Cervix is closed.    No free fluid.                                       Medications   acetaminophen tablet 1,000 mg (1,000 mg Oral Given 25 8726)     Medical Decision Making  Amount and/or Complexity of Data Reviewed  External Data Reviewed: notes.  Labs: ordered. Decision-making details documented in ED Course.  Radiology: ordered and independent interpretation performed. Decision-making details documented in ED Course.    Risk  OTC drugs.                     Patient presents to ED as above.  Differential includes not limited to threatened miscarriage, incomplete versus  complete miscarriage.  All labs reviewed by me and significant for normal WBC count, stable hemoglobin, downtrending beta hCG of 9329 from 04925 yesterday.  Patient's previous blood type is A positive.  Ultrasound obtained and per radiology, shows No intrauterine pregnancy.   Cervix closed.  This appears to be a completed miscarriage.  I did discuss results with patient at bedside.  She verbalizes understanding.  I have advised her to follow up with her obstetrician within the next 1 week for recheck.  ED return precautions discussed and provided.                 Clinical Impression:  Final diagnoses:  [O03.9] Spontaneous miscarriage (Primary)          ED Disposition Condition    Discharge Stable          ED Prescriptions    None       Follow-up Information       Follow up With Specialties Details Why Contact Info Additional Information    [facility] - Emergency Dept Emergency Medicine  As needed, If symptoms worsen [address] [facility] [id number] [phone] 1st floor             Danna Interiano MD  01/17/25 0143

## 2025-01-17 NOTE — DISCHARGE INSTRUCTIONS
It unfortunately appears that you have had a miscarriage based on your ultrasound tonight.  Please follow up with your obstetrician within 1 week for recheck.

## 2025-01-20 ENCOUNTER — PATIENT MESSAGE (OUTPATIENT)
Dept: OBSTETRICS AND GYNECOLOGY | Facility: CLINIC | Age: 40
End: 2025-01-20
Payer: COMMERCIAL

## 2025-01-24 ENCOUNTER — TELEPHONE (OUTPATIENT)
Dept: OBSTETRICS AND GYNECOLOGY | Facility: CLINIC | Age: 40
End: 2025-01-24
Payer: COMMERCIAL

## 2025-01-24 DIAGNOSIS — O03.9 MISCARRIAGE: Primary | ICD-10-CM

## 2025-01-24 NOTE — TELEPHONE ENCOUNTER
----- Message from Venkat sent at 1/24/2025 11:35 AM CST -----  Contact: Self  Type:  Patient Returning Call    Who Called:  Patient  Who Left Message for Patient:  Luisa  Does the patient know what this is regarding?:  Yes  Best Call Back Number:  [phone]   Additional Information:

## 2025-01-24 NOTE — TELEPHONE ENCOUNTER
Contacted pt and rescheduled her ER follow up visit 01/28/25 with Dr Mendoza. Pt voiced understanding.

## 2025-01-28 ENCOUNTER — OFFICE VISIT (OUTPATIENT)
Dept: OBSTETRICS AND GYNECOLOGY | Facility: CLINIC | Age: 40
End: 2025-01-28
Payer: COMMERCIAL

## 2025-01-28 ENCOUNTER — HOSPITAL ENCOUNTER (OUTPATIENT)
Dept: OBSTETRICS AND GYNECOLOGY | Facility: CLINIC | Age: 40
Discharge: HOME | End: 2025-01-28
Attending: STUDENT IN AN ORGANIZED HEALTH CARE EDUCATION/TRAINING PROGRAM
Payer: COMMERCIAL

## 2025-01-28 VITALS
DIASTOLIC BLOOD PRESSURE: 79 MMHG | BODY MASS INDEX: 31.88 KG/M2 | SYSTOLIC BLOOD PRESSURE: 139 MMHG | WEIGHT: 162.38 LBS | HEART RATE: 78 BPM | HEIGHT: 60 IN

## 2025-01-28 DIAGNOSIS — O03.9 SAB (SPONTANEOUS ABORTION): Primary | ICD-10-CM

## 2025-01-28 DIAGNOSIS — O03.9 MISCARRIAGE: ICD-10-CM

## 2025-01-28 PROCEDURE — 3008F BODY MASS INDEX DOCD: CPT | Mod: CPTII,S$GLB,, | Performed by: STUDENT IN AN ORGANIZED HEALTH CARE EDUCATION/TRAINING PROGRAM

## 2025-01-28 PROCEDURE — 1160F RVW MEDS BY RX/DR IN RCRD: CPT | Mod: CPTII,S$GLB,, | Performed by: STUDENT IN AN ORGANIZED HEALTH CARE EDUCATION/TRAINING PROGRAM

## 2025-01-28 PROCEDURE — 3075F SYST BP GE 130 - 139MM HG: CPT | Mod: CPTII,S$GLB,, | Performed by: STUDENT IN AN ORGANIZED HEALTH CARE EDUCATION/TRAINING PROGRAM

## 2025-01-28 PROCEDURE — 3078F DIAST BP <80 MM HG: CPT | Mod: CPTII,S$GLB,, | Performed by: STUDENT IN AN ORGANIZED HEALTH CARE EDUCATION/TRAINING PROGRAM

## 2025-01-28 PROCEDURE — 99999 PR PBB SHADOW E&M-EST. PATIENT-LVL III: CPT | Mod: PBBFAC,,, | Performed by: STUDENT IN AN ORGANIZED HEALTH CARE EDUCATION/TRAINING PROGRAM

## 2025-01-28 PROCEDURE — 1159F MED LIST DOCD IN RCRD: CPT | Mod: CPTII,S$GLB,, | Performed by: STUDENT IN AN ORGANIZED HEALTH CARE EDUCATION/TRAINING PROGRAM

## 2025-01-28 PROCEDURE — 99213 OFFICE O/P EST LOW 20 MIN: CPT | Mod: S$GLB,,, | Performed by: STUDENT IN AN ORGANIZED HEALTH CARE EDUCATION/TRAINING PROGRAM

## 2025-01-28 NOTE — PROGRESS NOTES
Chief Complaint   Patient presents with    Follow-up     ED follow up, missed AB       History of Present Illness   Amara Reis is 39 y.o. BF  patient who presents today to follow ER visit where a complete SAB was diagnosed.  Patient presented to the ER on 25 c/o vaginal spotting and passage of large clot.    US:  25 - CRL without FHT  . - No intrauterine pregnancy.  Endometrial thickness is 5 mm.  Uterus measures 8.5 x 5.4 x 7.0 cm. Ovaries are unremarkable.    Patient reported bleeding had stopped a couple of days ago but returned after she had been doing some housework.    History  PMH: Anemia, C. Diff colitis  Psx: D&C, exision of nose lesion, Dx LSC x 12 (ovarian cyst, endo, vaporization of uterosacral nerve complexes), ex lap   All: Morphin, Oral flagyl  OB:   GYN: Denies any STIs or abnl Pap  FH: Denies any female/colon cancer or MI prior to 49yo  SH: Denies RUBINA  Meds:  Current Outpatient Medications   Medication Instructions    multivit 47-iron-folate 1-dha ([facility] DHA) 27 mg iron-1 mg -300 mg Cap 1 tablet, Oral, Daily    prenatal no115/iron/folic acid (PRENATAL 19 ORAL) Take by mouth.       Review of Systems   Constitutional:  Negative for chills and fever.   Eyes:  Negative for visual disturbance.   Respiratory:  Negative for cough and shortness of breath.    Cardiovascular:  Negative for chest pain and palpitations.   Gastrointestinal:  Negative for abdominal pain, nausea and vomiting.   Genitourinary:  Positive for vaginal bleeding. Negative for dysmenorrhea, menstrual problem, vaginal discharge, vaginal pain and vaginal odor.   Neurological:  Negative for headaches.   Psychiatric/Behavioral:  Negative for depression and sleep disturbance. The patient is not nervous/anxious.    All other systems reviewed and are negative.  Breast: Negative for lump and mastodynia        Physical Examination:  Vitals:    25 0938   BP: 139/79   BP Location: Left arm   Patient  Position: Sitting   Pulse: 78   Weight: 73.6 kg (162 lb 5.9 oz)   Height: 5' (1.524 m)        Physical Exam  Vitals reviewed.   Constitutional:       Appearance: Normal appearance.   HENT:      Head: Normocephalic and atraumatic.   Eyes:      Conjunctiva/sclera: Conjunctivae normal.   Cardiovascular:      Rate and Rhythm: Normal rate and regular rhythm.   Pulmonary:      Effort: Pulmonary effort is normal.      Breath sounds: Normal breath sounds. No wheezing.   Abdominal:      General: Abdomen is flat.      Palpations: Abdomen is soft.      Tenderness: There is no abdominal tenderness.   Musculoskeletal:         General: Normal range of motion.      Cervical back: Normal range of motion.   Skin:     General: Skin is warm and dry.   Neurological:      General: No focal deficit present.      Mental Status: She is alert and oriented to person, place, and time.   Psychiatric:         Mood and Affect: Mood normal.         Behavior: Behavior normal.         Thought Content: Thought content normal.         Judgment: Judgment normal.          Assessment:    1. SAB (spontaneous )            Plan:  Reviewed US findings and thin endometrial lining c/w with completed Ab  Patient will try to conceive in the future  RV PRN    I spent a total of 20 minutes on the day of the visit.This includes face to face time and non-face to face time preparing to see the patient (eg, review of tests), obtaining and/or reviewing separately obtained history, documenting clinical information in the electronic or other health record, independently interpreting results and communicating results to the patient/family/caregiver, or care coordinator.

## 2025-01-30 DIAGNOSIS — E55.9 VITAMIN D DEFICIENCY: ICD-10-CM

## 2025-01-30 RX ORDER — ERGOCALCIFEROL 1.25 MG/1
CAPSULE ORAL
Qty: 4 CAPSULE | Refills: 5 | Status: SHIPPED | OUTPATIENT
Start: 2025-01-30

## 2025-02-04 ENCOUNTER — OFFICE VISIT (OUTPATIENT)
Dept: FAMILY MEDICINE | Facility: CLINIC | Age: 40
End: 2025-02-04
Payer: COMMERCIAL

## 2025-02-04 VITALS
BODY MASS INDEX: 31.22 KG/M2 | HEART RATE: 84 BPM | DIASTOLIC BLOOD PRESSURE: 96 MMHG | HEIGHT: 60 IN | SYSTOLIC BLOOD PRESSURE: 133 MMHG | WEIGHT: 159 LBS | OXYGEN SATURATION: 100 %

## 2025-02-04 DIAGNOSIS — E66.9 OBESITY (BMI 30-39.9): ICD-10-CM

## 2025-02-04 DIAGNOSIS — Z71.3 WEIGHT LOSS COUNSELING, ENCOUNTER FOR: Primary | ICD-10-CM

## 2025-02-04 PROCEDURE — 3008F BODY MASS INDEX DOCD: CPT | Mod: CPTII,S$GLB,, | Performed by: FAMILY MEDICINE

## 2025-02-04 PROCEDURE — 1159F MED LIST DOCD IN RCRD: CPT | Mod: CPTII,S$GLB,, | Performed by: FAMILY MEDICINE

## 2025-02-04 PROCEDURE — 3075F SYST BP GE 130 - 139MM HG: CPT | Mod: CPTII,S$GLB,, | Performed by: FAMILY MEDICINE

## 2025-02-04 PROCEDURE — 3080F DIAST BP >= 90 MM HG: CPT | Mod: CPTII,S$GLB,, | Performed by: FAMILY MEDICINE

## 2025-02-04 PROCEDURE — 99999 PR PBB SHADOW E&M-EST. PATIENT-LVL III: CPT | Mod: PBBFAC,,, | Performed by: FAMILY MEDICINE

## 2025-02-04 PROCEDURE — 99213 OFFICE O/P EST LOW 20 MIN: CPT | Mod: S$GLB,,, | Performed by: FAMILY MEDICINE

## 2025-02-04 RX ORDER — SEMAGLUTIDE 1 MG/.5ML
1 INJECTION, SOLUTION SUBCUTANEOUS
Qty: 2 ML | Refills: 0 | Status: SHIPPED | OUTPATIENT
Start: 2025-04-04

## 2025-02-04 RX ORDER — SEMAGLUTIDE 1.7 MG/.75ML
1.7 INJECTION, SOLUTION SUBCUTANEOUS
Qty: 2 ML | Refills: 1 | Status: SHIPPED | OUTPATIENT
Start: 2025-05-04

## 2025-02-04 RX ORDER — SEMAGLUTIDE 0.25 MG/.5ML
0.25 INJECTION, SOLUTION SUBCUTANEOUS
Qty: 2 ML | Refills: 0 | Status: SHIPPED | OUTPATIENT
Start: 2025-02-04

## 2025-02-04 RX ORDER — SEMAGLUTIDE 0.5 MG/.5ML
0.5 INJECTION, SOLUTION SUBCUTANEOUS
Qty: 2 ML | Refills: 0 | Status: SHIPPED | OUTPATIENT
Start: 2025-03-04

## 2025-02-04 NOTE — PROGRESS NOTES
"  SUBJECTIVE:    Patient ID: Amara Reis is a 39 y.o. female.    Chief Complaint: Weight Loss  38 yo female here today to follow-up on her medical weight loss management .  Pt became pregnant and had to stop the Wegovy, she has subsequently had a miscarrage at 4 month, she would like to restart the Wegovy. Past medical history is notable for PCOS.  Patient has been on Wegovy in the past without any unwanted side effects.        High school weight:  85  Ideal Body weight 100  Goal Weight 120     06/23/50511  08/02/24          151  11/05/24 142  02/04/25 159     History of uncontrolled hypertension? no  History of cardiovascular disease? no  History of glaucoma? no  History of eating disorder? As a child, used to starve self "to stay skinny" under her mother's guidance. Was 85 lb at first pregnancy  History of substance use? none  History of cholelithiasis? no  History of pancreatitis? no  Personal or family history of Medullary Thyroid Cancer or MEN2? No    HPI      Past Medical History:   Diagnosis Date    Anemia     Clostridium difficile colitis     hx--reoccurs with antibiotic use    Hypertrophy of nasal turbinates 02/18/2019     Social History     Socioeconomic History    Marital status:    Tobacco Use    Smoking status: Never     Passive exposure: Never    Smokeless tobacco: Never   Substance and Sexual Activity    Alcohol use: No     Comment: rarely    Drug use: Never    Sexual activity: Yes     Partners: Male     Social Drivers of Health     Financial Resource Strain: Medium Risk (5/16/2024)    Overall Financial Resource Strain (CARDIA)     Difficulty of Paying Living Expenses: Somewhat hard   Food Insecurity: No Food Insecurity (5/16/2024)    Hunger Vital Sign     Worried About Running Out of Food in the Last Year: Never true     Ran Out of Food in the Last Year: Never true   Transportation Needs: No Transportation Needs (2/13/2023)    Received from [facility] and Its Subsidiaries and Affiliates, [facility] and Its Subsidiaries and Affiliates    PRAPARE - Transportation     Lack of Transportation (Medical): No     Lack of Transportation (Non-Medical): No   Physical Activity: Inactive (5/16/2024)    Exercise Vital Sign     Days of Exercise per Week: 0 days     Minutes of Exercise per Session: 0 min   Stress: Stress Concern Present (5/16/2024)    [employer] [employer] - Occupational Stress Questionnaire     Feeling of Stress : Rather much   Housing Stability: Unknown (5/16/2024)    Housing Stability Vital Sign     Unable to Pay for Housing in the Last Year: No     Past Surgical History:   Procedure Laterality Date    DILATION AND CURETTAGE OF UTERUS      EXCISION OF LESION OF NOSE Bilateral 02/18/2019    Procedure: EXCISION, LESION, NOSE;  Surgeon: Chadd Ward MD;  Location: [facility] OR;  Service: ENT;  Laterality: Bilateral;    LAPAROSCOPY      Laparoscopy x 12 for ovarian cysts, endometriosis, laser vaporization of uterosacral nerve complexes (7/26/2013)    LAPAROTOMY      laparotomy for possible myomectomy    MYRINGOTOMY WITH INSERTION OF VENTILATION TUBE Right 02/18/2019    Procedure: MYRINGOTOMY, WITH TYMPANOSTOMY TUBE INSERTION;  Surgeon: Chadd Ward MD;  Location: [facility] OR;  Service: ENT;  Laterality: Right;     Family History   Problem Relation Name Age of Onset    Hypertension Mother Candy mccauley     Miscarriages / Stillbirths Mother Candy mccauley     Fibromyalgia Mother Candy mccauley     Hypertension Father Philippe mccauley     Thyroid disease Brother      Breast cancer Neg Hx      Colon cancer Neg Hx       Current Outpatient Medications   Medication Sig Dispense Refill    multivit 47-iron-folate 1-dha ([facility] DHA) 27 mg iron-1 mg -300 mg Cap Take 1 tablet by mouth once daily. 30 capsule 5    prenatal no115/iron/folic acid (PRENATAL 19 ORAL) Take by mouth.      VITAMIN D2 1,250 mcg (50,000 unit)  capsule TAKE one CAPSULE BY MOUTH EVERY SEVEN DAYS 4 capsule 5    semaglutide, weight loss, (WEGOVY) 0.25 mg/0.5 mL PnIj Inject 0.25 mg into the skin every 7 days. 2 mL 0    [START ON 3/4/2025] semaglutide, weight loss, (WEGOVY) 0.5 mg/0.5 mL PnIj Inject 0.5 mg into the skin every 7 days. 2 mL 0    [START ON 4/4/2025] semaglutide, weight loss, (WEGOVY) 1 mg/0.5 mL PnIj Inject 1 mg into the skin every 7 days. 2 mL 0    [START ON 5/4/2025] semaglutide, weight loss, (WEGOVY) 1.7 mg/0.75 mL PnIj Inject 1.7 mg into the skin every 7 days. 2 mL 1     No current facility-administered medications for this visit.     Review of patient's allergies indicates:   Allergen Reactions    Morphine Hives, Itching and Nausea And Vomiting    Flagyl [metronidazole hcl] Other (See Comments)     Oral form only--burns her   stomach. Take IV only.  Had to have surgery to fix this       Review of Systems   Constitutional:  Negative for activity change, diaphoresis, fatigue and unexpected weight change.   HENT:  Negative for congestion, hearing loss, postnasal drip, rhinorrhea, sinus pain and trouble swallowing.    Eyes:  Negative for discharge and visual disturbance.   Respiratory:  Negative for cough, chest tightness, shortness of breath and wheezing.    Cardiovascular:  Negative for chest pain and palpitations.   Gastrointestinal:  Negative for blood in stool, constipation, diarrhea and vomiting.   Endocrine: Negative for polydipsia and polyuria.   Genitourinary:  Negative for difficulty urinating, dysuria, hematuria and menstrual problem.   Musculoskeletal:  Negative for arthralgias, joint swelling and neck pain.   Neurological:  Negative for weakness and headaches.   Psychiatric/Behavioral:  Negative for confusion and dysphoric mood.           Blood pressure (!) 133/96, pulse 84, height 5' (1.524 m), weight 72.1 kg (159 lb), last menstrual period 11/05/2024, SpO2 100%, unknown if currently breastfeeding. Body mass index is 31.05 kg/m².    Objective:      Physical Exam  Vitals reviewed.   Constitutional:       General: She is not in acute distress.     Appearance: She is well-developed.   HENT:      Head: Normocephalic and atraumatic.      Right Ear: External ear normal.      Left Ear: External ear normal.   Eyes:      General:         Right eye: No discharge.         Left eye: No discharge.      Conjunctiva/sclera: Conjunctivae normal.      Pupils: Pupils are equal, round, and reactive to light.   Cardiovascular:      Rate and Rhythm: Normal rate and regular rhythm.      Heart sounds: Normal heart sounds. No murmur heard.  Pulmonary:      Effort: Pulmonary effort is normal. No respiratory distress.      Breath sounds: Normal breath sounds.   Skin:     General: Skin is warm and dry.         Assessment:       1. Weight loss counseling, encounter for    2. Obesity (BMI 30-39.9)         Plan:           Weight loss counseling, encounter for  -     semaglutide, weight loss, (WEGOVY) 0.25 mg/0.5 mL PnIj; Inject 0.25 mg into the skin every 7 days.  Dispense: 2 mL; Refill: 0  -     semaglutide, weight loss, (WEGOVY) 0.5 mg/0.5 mL PnIj; Inject 0.5 mg into the skin every 7 days.  Dispense: 2 mL; Refill: 0  -     semaglutide, weight loss, (WEGOVY) 1 mg/0.5 mL PnIj; Inject 1 mg into the skin every 7 days.  Dispense: 2 mL; Refill: 0  -     semaglutide, weight loss, (WEGOVY) 1.7 mg/0.75 mL PnIj; Inject 1.7 mg into the skin every 7 days.  Dispense: 2 mL; Refill: 1    To support continued weight loss, it is essential to incorporate regular physical activity into your daily routine, aiming for at least 150 minutes of moderate-intensity exercise per week. Focus on a balanced diet rich in whole foods, such as fruits, vegetables, lean proteins, and whole grains, while minimizing the intake of processed foods, sugary beverages, and high-fat snacks. Practice mindful eating by paying attention to hunger cues and portion sizes, which can help prevent overeating.  Additionally, ensure adequate hydration by drinking plenty of water throughout the day, and consider keeping a food diary to track your progress and identify areas for improvement.     Obesity (BMI 30-39.9)  -     semaglutide, weight loss, (WEGOVY) 0.25 mg/0.5 mL PnIj; Inject 0.25 mg into the skin every 7 days.  Dispense: 2 mL; Refill: 0  -     semaglutide, weight loss, (WEGOVY) 0.5 mg/0.5 mL PnIj; Inject 0.5 mg into the skin every 7 days.  Dispense: 2 mL; Refill: 0  -     semaglutide, weight loss, (WEGOVY) 1 mg/0.5 mL PnIj; Inject 1 mg into the skin every 7 days.  Dispense: 2 mL; Refill: 0  -     semaglutide, weight loss, (WEGOVY) 1.7 mg/0.75 mL PnIj; Inject 1.7 mg into the skin every 7 days.  Dispense: 2 mL; Refill: 1

## 2025-02-07 NOTE — PROGRESS NOTES
ALLERGY & IMMUNOLOGY CLINIC -  NEW PATIENT     HISTORY OF PRESENT ILLNESS     Patient ID: Amara Reis is a 39 y.o. female    CC:   Chief Complaint   Patient presents with    Allergies       HPI: Amara Reis is a 39 y.o. female presents for evaluation of:    02/10/2025  Presents today for evaluation of food allergy. States that she previously did not experience much in the way of an atopic history such as asthma or allergic rhinitis. Has noticed recently within the previous months to experience a scratchy throat with occasional diffuse hives/pruritus. Unsure whether the hives are related to dietary intakes as she states she will sometimes have urticaria without recent food or medication ingestion. Denies multi-system involvement associated with foods consumed. Does believe that citrus fruits will aggravate symptoms including kiwi, pineapple, star fruit). Administers Benadryl and falls asleep for 8-9 hours after taking and hives can still be present. Unable to estimate frequency or how long individual episodes will last. Has not needed to use an Epipen or felt like an ER trip was warranted      H/o Asthma: denies  H/o Eczema: denies  H/o Rhinitis: denies  Venom Allergy: denies  Latex Allergy: denies  Env/Occ: denies any environmental or occupational exposures     REVIEW OF SYSTEMS     CONST: no F/C/NS, no unintentional weight changes  Balance of review of systems negative except as mentioned above     MEDICAL HISTORY     MedHx: active problems reviewed  SurgHx:   Past Surgical History:   Procedure Laterality Date    DILATION AND CURETTAGE OF UTERUS      EXCISION OF LESION OF NOSE Bilateral 02/18/2019    Procedure: EXCISION, LESION, NOSE;  Surgeon: Chdad Ward MD;  Location: [facility];  Service: ENT;  Laterality: Bilateral;    LAPAROSCOPY      Laparoscopy x 12 for ovarian cysts, endometriosis, laser vaporization of uterosacral nerve complexes (7/26/2013)    LAPAROTOMY      laparotomy for possible myomectomy     MYRINGOTOMY WITH INSERTION OF VENTILATION TUBE Right 02/18/2019    Procedure: MYRINGOTOMY, WITH TYMPANOSTOMY TUBE INSERTION;  Surgeon: Chadd Ward MD;  Location: [facility];  Service: ENT;  Laterality: Right;     SocHx:   -Smoking: Denies  FamHx:   Son with allergic rhinitis   Otherwise no Family History of asthma, allergic rhinitis, atopic dermatitis, drug allergy, food allergy, venom allergy or immune deficiency.     Allergies: see below  Medications: MAR reviewed       PHYSICAL EXAM     VS: Ht 5' (1.524 m)   Wt 72.1 kg (158 lb 15.2 oz)   LMP 11/05/2024 (Exact Date)   BMI 31.04 kg/m²   GENERAL: awake, alert, cooperative with exam  EYES: PERRL, EOMI, no conjunctival injection, no discharge, no infraorbital shiners  EARS: external auditory canals normal B/L, TM normal B/L; Extruded ear tube in left ear  NOSE: NT 2-3+ and pink B/L, no stringing mucous, no polyps  LUNGS: CTAB, no w/r/c, no increased WOB  HEART: Normal Rate and regular rhythm, normal S1/S2, no m/g/r  EXTREMITIES: +2 distal pulses, no c/c/e  DERM: no rashes, no skin breaks     CHART REVIEW     Previous Notes     ASSESSMENT/PLAN     Amara Reis is a 39 y.o. female with       1. Chronic rhinitis    2. Pollen-food allergy, subsequent encounter    3. Pruritus    4. Chronic urticaria      Suspicious for pollen food allergy syndrome vs IgE mediated hypersensitivity; unclear form history whether component of chronic idiopathic urticaria is present as well given association of urticaria in the absence of food/medication consumption. Send for region 6 allergen panel today to establish possible environmental allergen sensitization as well as food specific IgE levels given strict avoidance at this time    Follow up: Pending results-Consider arranging skin prick testing as needed       Behzad Slaughter MD    I spent a total of 45 minutes on the day of the visit. This includes face to face time and non-face to face time preparing to see the patient (eg,  review of tests), obtaining and/or reviewing separately obtained history, documenting clinical information in the electronic or other health record, independently interpreting results and communicating results to the patient/family/caregiver, or care coordinator.

## 2025-02-10 ENCOUNTER — LAB VISIT (OUTPATIENT)
Dept: LAB | Facility: HOSPITAL | Age: 40
End: 2025-02-10
Attending: OBSTETRICS & GYNECOLOGY
Payer: COMMERCIAL

## 2025-02-10 ENCOUNTER — OFFICE VISIT (OUTPATIENT)
Dept: ALLERGY | Facility: CLINIC | Age: 40
End: 2025-02-10
Payer: COMMERCIAL

## 2025-02-10 VITALS — HEIGHT: 60 IN | BODY MASS INDEX: 31.2 KG/M2 | WEIGHT: 158.94 LBS

## 2025-02-10 DIAGNOSIS — O36.80X1 PREGNANCY WITH INCONCLUSIVE FETAL VIABILITY, FETUS 1 OF MULTIPLE GESTATION: ICD-10-CM

## 2025-02-10 DIAGNOSIS — J31.0 CHRONIC RHINITIS: ICD-10-CM

## 2025-02-10 DIAGNOSIS — L29.9 PRURITUS: ICD-10-CM

## 2025-02-10 DIAGNOSIS — L50.8 CHRONIC URTICARIA: ICD-10-CM

## 2025-02-10 DIAGNOSIS — J31.0 CHRONIC RHINITIS: Primary | ICD-10-CM

## 2025-02-10 DIAGNOSIS — T78.1XXD POLLEN-FOOD ALLERGY, SUBSEQUENT ENCOUNTER: ICD-10-CM

## 2025-02-10 LAB — HCG INTACT+B SERPL-ACNC: <2.4 MIU/ML

## 2025-02-10 PROCEDURE — 82785 ASSAY OF IGE: CPT | Performed by: STUDENT IN AN ORGANIZED HEALTH CARE EDUCATION/TRAINING PROGRAM

## 2025-02-10 PROCEDURE — 86003 ALLG SPEC IGE CRUDE XTRC EA: CPT | Performed by: STUDENT IN AN ORGANIZED HEALTH CARE EDUCATION/TRAINING PROGRAM

## 2025-02-10 PROCEDURE — 99999 PR PBB SHADOW E&M-EST. PATIENT-LVL III: CPT | Mod: PBBFAC,,, | Performed by: STUDENT IN AN ORGANIZED HEALTH CARE EDUCATION/TRAINING PROGRAM

## 2025-02-10 PROCEDURE — 84702 CHORIONIC GONADOTROPIN TEST: CPT | Performed by: OBSTETRICS & GYNECOLOGY

## 2025-02-10 PROCEDURE — 1159F MED LIST DOCD IN RCRD: CPT | Mod: CPTII,S$GLB,, | Performed by: STUDENT IN AN ORGANIZED HEALTH CARE EDUCATION/TRAINING PROGRAM

## 2025-02-10 PROCEDURE — 99204 OFFICE O/P NEW MOD 45 MIN: CPT | Mod: S$GLB,,, | Performed by: STUDENT IN AN ORGANIZED HEALTH CARE EDUCATION/TRAINING PROGRAM

## 2025-02-10 PROCEDURE — 36415 COLL VENOUS BLD VENIPUNCTURE: CPT | Mod: PO | Performed by: OBSTETRICS & GYNECOLOGY

## 2025-02-10 PROCEDURE — 86003 ALLG SPEC IGE CRUDE XTRC EA: CPT | Mod: 59 | Performed by: STUDENT IN AN ORGANIZED HEALTH CARE EDUCATION/TRAINING PROGRAM

## 2025-02-10 PROCEDURE — 3008F BODY MASS INDEX DOCD: CPT | Mod: CPTII,S$GLB,, | Performed by: STUDENT IN AN ORGANIZED HEALTH CARE EDUCATION/TRAINING PROGRAM

## 2025-02-14 ENCOUNTER — PATIENT MESSAGE (OUTPATIENT)
Dept: ALLERGY | Facility: CLINIC | Age: 40
End: 2025-02-14
Payer: COMMERCIAL

## 2025-02-14 LAB
A ALTERNATA IGE QN: <0.1 KU/L
A FUMIGATUS IGE QN: <0.1 KU/L
ALLERGEN BOXELDER MAPLE TREE IGE: <0.1 KU/L
ALLERGEN MULBERRY TREE IGE: <0.1 KU/L
ALLERGEN PIGWEED IGE: <0.1 KU/L
ALLERGEN WALNUT TREE IGE: <0.1 KU/L
BERMUDA GRASS IGE QN: <0.1 KU/L
C HERBARUM IGE QN: <0.1 KU/L
CAT DANDER IGE QN: <0.1 KU/L
COMMON RAGWEED IGE QN: <0.1 KU/L
D FARINAE IGE QN: <0.1 KU/L
D PTERONYSS IGE QN: <0.1 KU/L
DEPRECATED TIMOTHY IGE RAST QL: NORMAL
DOG DANDER IGE QN: <0.1 KU/L
ELDER IGE QN: <0.1 KU/L
IGE: 5.7 IU/ML
KIWIFRUIT IGE QN: <0.1 KU/L
MOUSE URINE PROT IGE QN: <0.1 KU/L
MT JUNIPER IGE QN: <0.1 KU/L
ORANGE IGE QN: <0.1 KU/L
P NOTATUM IGE QN: <0.1 KU/L
PECAN/HICK TREE IGE QN: <0.1 KU/L
PINEAPPLE IGE QN: <0.1 KU/L
RAST ALLERGEN INTERPRETATION: NORMAL
RAST CLASS: NORMAL
ROACH IGE QN: <0.1 KU/L
SILVER BIRCH IGE QN: <0.1 KU/L
TIMOTHY IGE QN: <0.1 KU/L
WATERMELON IGE QN: <0.1 KU/L
WHITE ELM IGE QN: <0.1 KU/L
WHITE OAK IGE QN: <0.1 KU/L

## 2025-02-27 ENCOUNTER — PATIENT MESSAGE (OUTPATIENT)
Dept: OBSTETRICS AND GYNECOLOGY | Facility: CLINIC | Age: 40
End: 2025-02-27
Payer: COMMERCIAL

## 2025-03-06 ENCOUNTER — TELEPHONE (OUTPATIENT)
Dept: OBSTETRICS AND GYNECOLOGY | Facility: CLINIC | Age: 40
End: 2025-03-06
Payer: COMMERCIAL

## 2025-03-06 DIAGNOSIS — N93.9 ABNORMAL UTERINE BLEEDING (AUB): Primary | ICD-10-CM

## 2025-03-06 RX ORDER — TRANEXAMIC ACID 650 MG/1
1300 TABLET ORAL 3 TIMES DAILY
Qty: 30 TABLET | Refills: 1 | Status: SHIPPED | OUTPATIENT
Start: 2025-03-06 | End: 2025-03-07 | Stop reason: SDUPTHER

## 2025-03-06 NOTE — TELEPHONE ENCOUNTER
----- Message from Krishna Mendoza MD sent at 3/6/2025 11:34 AM CST -----  Please contact this patient and let her know I have sent in a short refill on her TXA for her abnormal bleeding.  Since we have never address this with her and the treatment was initially started by another physician please get her into the office for office evaluation so we can continue this treatment in the future.

## 2025-03-07 ENCOUNTER — OFFICE VISIT (OUTPATIENT)
Dept: OBSTETRICS AND GYNECOLOGY | Facility: CLINIC | Age: 40
End: 2025-03-07
Payer: COMMERCIAL

## 2025-03-07 VITALS
SYSTOLIC BLOOD PRESSURE: 124 MMHG | WEIGHT: 159.38 LBS | BODY MASS INDEX: 31.13 KG/M2 | DIASTOLIC BLOOD PRESSURE: 84 MMHG

## 2025-03-07 DIAGNOSIS — N93.9 ABNORMAL UTERINE BLEEDING (AUB): Primary | ICD-10-CM

## 2025-03-07 DIAGNOSIS — R32 URINARY INCONTINENCE IN FEMALE: ICD-10-CM

## 2025-03-07 PROCEDURE — 3074F SYST BP LT 130 MM HG: CPT | Mod: CPTII,S$GLB,, | Performed by: OBSTETRICS & GYNECOLOGY

## 2025-03-07 PROCEDURE — 99999 PR PBB SHADOW E&M-EST. PATIENT-LVL III: CPT | Mod: PBBFAC,,, | Performed by: OBSTETRICS & GYNECOLOGY

## 2025-03-07 PROCEDURE — 3079F DIAST BP 80-89 MM HG: CPT | Mod: CPTII,S$GLB,, | Performed by: OBSTETRICS & GYNECOLOGY

## 2025-03-07 PROCEDURE — 3008F BODY MASS INDEX DOCD: CPT | Mod: CPTII,S$GLB,, | Performed by: OBSTETRICS & GYNECOLOGY

## 2025-03-07 PROCEDURE — 99213 OFFICE O/P EST LOW 20 MIN: CPT | Mod: S$GLB,,, | Performed by: OBSTETRICS & GYNECOLOGY

## 2025-03-07 PROCEDURE — 1159F MED LIST DOCD IN RCRD: CPT | Mod: CPTII,S$GLB,, | Performed by: OBSTETRICS & GYNECOLOGY

## 2025-03-07 RX ORDER — TRANEXAMIC ACID 650 MG/1
1300 TABLET ORAL 3 TIMES DAILY
Qty: 30 TABLET | Refills: 1 | Status: SHIPPED | OUTPATIENT
Start: 2025-03-07 | End: 2026-03-07

## 2025-03-07 NOTE — PROGRESS NOTES
[facility] Note    Pertinent Med & GYN Problem List      Subjective:   Chief Complaint:  Vaginal Bleeding      Date: 3/7/2025     Patient ID: Amara Reis is a  39 y.o. female.    Contraception: None    Patient's last menstrual period was 2025.    The patient presents today due to the following:    Gynecologic issues:  The patient reports regular but heavy bleeding with clots with her monthly cycles.    She does desire pregnancy in the future and is therefore being treated with TXA monthly with the onset of menses.  She reports heavy menses for approximately two years.    She reports a miscarriage without subsequent issues in 2025.    In addition to the above she denies any dysuria or hematuria but reports issues with stress associated urinary incontinence.    Pap smear history:  Positive history of abnormal Pap smears.  Last Pap smear: 10/2024    Personal or family history of bleeding or blood clotting disorders:  Negative     Family history:  Breast cancer:  Negative  Colon cancer:  Negative   Gyn related cancer:  Negative      GYN & OB History  LMP: Patient's last menstrual period was 2025.     Age of Menarche:13  Age at first pregnancy:18   Age at first live birth:22  Number of months breastfeeding:      OB History          5    Para   3    Term   3            AB   2    Living   3         SAB   2    IAB        Ectopic        Multiple   0    Live Births   3           Obstetric Comments   Vaginal delivery x 3  SAB x 2               Allergies:   Review of patient's allergies indicates:   Allergen Reactions    Morphine Hives, Itching and Nausea And Vomiting    Flagyl [metronidazole hcl] Other (See Comments)     Oral form only--burns her   stomach. Take IV only.  Had to have surgery to fix this       Past Medical History:   Diagnosis Date    Anemia     Clostridium difficile colitis     hx--reoccurs with antibiotic use    Hypertrophy of  nasal turbinates 02/18/2019       Past Surgical History:   Procedure Laterality Date    DILATION AND CURETTAGE OF UTERUS      EXCISION OF LESION OF NOSE Bilateral 02/18/2019    Procedure: EXCISION, LESION, NOSE;  Surgeon: Chadd Ward MD;  Location: [facility] OR;  Service: ENT;  Laterality: Bilateral;    LAPAROSCOPY      Laparoscopy x 12 for ovarian cysts, endometriosis, laser vaporization of uterosacral nerve complexes (7/26/2013)    LAPAROTOMY      laparotomy for possible myomectomy    MYRINGOTOMY WITH INSERTION OF VENTILATION TUBE Right 02/18/2019    Procedure: MYRINGOTOMY, WITH TYMPANOSTOMY TUBE INSERTION;  Surgeon: Chadd Ward MD;  Location: [facility] OR;  Service: ENT;  Laterality: Right;       Medications  Current Medications[1]     Social History[2]    Family History   Problem Relation Name Age of Onset    Hypertension Mother Candy mccauley     Miscarriages / Stillbirths Mother Candy mccauley     Fibromyalgia Mother Candy mccauley     Hypertension Father Philippe mccauley     Thyroid disease Brother      Breast cancer Neg Hx      Colon cancer Neg Hx         Review of Systems (at today's evaluation)  Review of Systems   Constitutional:  Negative for fever and unexpected weight change.   Respiratory:  Negative for cough and shortness of breath.    Cardiovascular:  Negative for chest pain and palpitations.   Gastrointestinal:  Negative for constipation, diarrhea, nausea and vomiting.   Genitourinary:  Negative for dysuria, frequency, hematuria and urgency.        Gyn as per HPI   Neurological:  Negative for headaches.        Objective:      Vitals:    03/07/25 1448   BP: 124/84     Physical Exam:   Constitutional: She appears well-developed and well-nourished. No distress.    HENT:   Head: Normocephalic.     Neck: No thyroid mass present.    Cardiovascular:  Normal rate.             Pulmonary/Chest: Effort normal. No respiratory distress.        Abdominal: Soft. There is no abdominal tenderness.              Musculoskeletal: Normal range of motion.      Right lower leg: No edema.      Left lower leg: No edema.       Neurological: She is alert.   No gross lesions noted.    Skin: Skin is warm and dry.    Psychiatric: She has a normal mood and affect. Her speech is normal and behavior is normal. Mood normal.         Office urinalysis on 3/7/2025    Negative:  Glucose / Bilirubin / Ketones / Blood / Protein / Nitrites  POSITIVE:  LEUKOCYTE ESTERASE Trace      Assessment:        1. Abnormal uterine bleeding (AUB)    2. Urinary incontinence in female        Plan:      Abnormal uterine bleeding (AUB)  -     tranexamic acid (LYSTEDA) 650 mg tablet; Take 2 tablets (1,300 mg total) by mouth 3 (three) times daily. Take for up to 5 days during your period (you will use up to 30 tablets per menstrual cycle).  Dispense: 30 tablet; Refill: 1    Urinary incontinence in female  -     Ambulatory referral/consult to Urogynecology; Future; Expected date: 03/14/2025       Follow up for as needed / for any GYN related issues.     The above was reviewed discussed with the patient.  We discussed the patient's issues with heavy bleeding.  In light of her desire for pregnancy in the future she does not desire traditional hormonal intervention that would disrupt the ability to become pregnant.  At this time she is comfortable with the TXA used monthly to aid in her bleeding and cramping.  We discussed her age and pregnancy related issues.      We discussed her urinary incontinence including findings on her recent urinalysis as above.  Urine will be sent for culture and sensitivity but the patient is being referred to Urogynecology for further evaluation and potential treatment.    The patient's questions were answered, and she is in agreement with the current plan.     Krishna Mendoza MD  Department OBGYN  [facility]         [1]   Current Outpatient Medications:     multivit 47-iron-folate 1-dha ([facility] DHA) 27 mg iron-1 mg -300 mg Cap,  Take 1 tablet by mouth once daily., Disp: 30 capsule, Rfl: 5    prenatal no115/iron/folic acid (PRENATAL 19 ORAL), Take by mouth., Disp: , Rfl:     semaglutide, weight loss, (WEGOVY) 0.5 mg/0.5 mL PnIj, Inject 0.5 mg into the skin every 7 days., Disp: 2 mL, Rfl: 0    [START ON 4/4/2025] semaglutide, weight loss, (WEGOVY) 1 mg/0.5 mL PnIj, Inject 1 mg into the skin every 7 days., Disp: 2 mL, Rfl: 0    [START ON 5/4/2025] semaglutide, weight loss, (WEGOVY) 1.7 mg/0.75 mL PnIj, Inject 1.7 mg into the skin every 7 days., Disp: 2 mL, Rfl: 1    tranexamic acid (LYSTEDA) 650 mg tablet, Take 2 tablets (1,300 mg total) by mouth 3 (three) times daily. Take for up to 5 days during your period (you will use up to 30 tablets per menstrual cycle)., Disp: 30 tablet, Rfl: 1    VITAMIN D2 1,250 mcg (50,000 unit) capsule, TAKE one CAPSULE BY MOUTH EVERY SEVEN DAYS, Disp: 4 capsule, Rfl: 5  [2]   Social History  Tobacco Use    Smoking status: Never     Passive exposure: Never    Smokeless tobacco: Never   Substance Use Topics    Alcohol use: No     Comment: rarely    Drug use: Never

## 2025-03-10 ENCOUNTER — PATIENT MESSAGE (OUTPATIENT)
Dept: ADMINISTRATIVE | Facility: HOSPITAL | Age: 40
End: 2025-03-10
Payer: COMMERCIAL

## 2025-03-11 ENCOUNTER — PATIENT OUTREACH (OUTPATIENT)
Dept: ADMINISTRATIVE | Facility: HOSPITAL | Age: 40
End: 2025-03-11
Payer: COMMERCIAL

## 2025-03-11 NOTE — PROGRESS NOTES
Population Health Chart Review & Patient Outreach Details      Additional [facility] Health Notes:               Updates Requested / Reviewed:      Updated Care Coordination Note, Care Everywhere, , and Immunizations Reconciliation Completed or Queried: [place]         Health Maintenance Topics Overdue:      [facility] Score: 1                             Health Maintenance Topic(s) Outreach Outcomes & Actions Taken:    Blood Pressure - Outreach Outcomes & Actions Taken  : Remote Blood Pressure Reading Captured

## 2025-03-25 ENCOUNTER — OFFICE VISIT (OUTPATIENT)
Dept: URGENT CARE | Facility: CLINIC | Age: 40
End: 2025-03-25
Payer: COMMERCIAL

## 2025-03-25 ENCOUNTER — OFFICE VISIT (OUTPATIENT)
Dept: OTOLARYNGOLOGY | Facility: CLINIC | Age: 40
End: 2025-03-25
Payer: COMMERCIAL

## 2025-03-25 VITALS — HEIGHT: 60 IN | BODY MASS INDEX: 30.23 KG/M2 | WEIGHT: 154 LBS

## 2025-03-25 DIAGNOSIS — H72.91 ACUTE OTITIS MEDIA WITH PERFORATION, RIGHT: Primary | ICD-10-CM

## 2025-03-25 DIAGNOSIS — H66.004 RECURRENT ACUTE SUPPURATIVE OTITIS MEDIA OF RIGHT EAR WITHOUT SPONTANEOUS RUPTURE OF TYMPANIC MEMBRANE: Primary | ICD-10-CM

## 2025-03-25 DIAGNOSIS — H66.91 ACUTE OTITIS MEDIA WITH PERFORATION, RIGHT: Primary | ICD-10-CM

## 2025-03-25 PROCEDURE — 99999 PR PBB SHADOW E&M-EST. PATIENT-LVL II: CPT | Mod: PBBFAC,,, | Performed by: OTOLARYNGOLOGY

## 2025-03-25 PROCEDURE — 99213 OFFICE O/P EST LOW 20 MIN: CPT | Mod: S$GLB,,, | Performed by: OTOLARYNGOLOGY

## 2025-03-25 PROCEDURE — 3008F BODY MASS INDEX DOCD: CPT | Mod: S$GLB,,, | Performed by: OTOLARYNGOLOGY

## 2025-03-25 PROCEDURE — 1159F MED LIST DOCD IN RCRD: CPT | Mod: S$GLB,,, | Performed by: OTOLARYNGOLOGY

## 2025-03-25 PROCEDURE — 1160F RVW MEDS BY RX/DR IN RCRD: CPT | Mod: S$GLB,,, | Performed by: OTOLARYNGOLOGY

## 2025-03-25 PROCEDURE — 99214 OFFICE O/P EST MOD 30 MIN: CPT | Mod: S$GLB,,,

## 2025-03-25 RX ORDER — CIPROFLOXACIN HYDROCHLORIDE 3 MG/ML
SOLUTION/ DROPS OPHTHALMIC
Qty: 10 ML | Refills: 3 | Status: SHIPPED | OUTPATIENT
Start: 2025-03-25

## 2025-03-25 NOTE — PROGRESS NOTES
"Subjective:       Patient ID: Amara Reis is a 39 y.o. female.    Chief Complaint: Ear Problem (Patient is having ear issues. Her right ear is "bleeding no other drainage, just blood." She was seen at urgent care but was unsure about the abx. )      Patient over a year ago she was having problems with the right ear which has a long term issue for her.  She has a T-tube placed probably by Dr. Ward in [place] and when I saw her there was crusting around it but it was in place and she has seen Nikolai for a few different issues in the interim but she comes in today her story is such that yesterday everything was just fine last night she thought she was dreaming but in any case she has developed significant amount of bleeding from the right ear and discomfort and reduced hearing.  She has not been sick with any kind of cold or other issue and the ear was seeming to be fine as recently as yesterday.          Objective:      ENT Physical Exam    So her left tympanic membrane and ear canal look fine     On the right side there has a good bit of fresh blood a small amount of a green like semi solid exudate and after suctioning this to the extent that was tolerable there still bled deep in the ear canal and she is sensitive to manipulations I was afraid to try and suctioned deeper and I do not think there has a useful point two doing that with the exception of being more sure what the status of the tube is.              Assessment:       1. Recurrent acute suppurative otitis media of right ear without spontaneous rupture of tympanic membrane         Plan:          So for now I just want her to use some Cipro drops, I have sent in a large bottle and I am going to see her in a few days in [place] to reexamine and see how things stand come hopefully the blood we will be rinsed out by the drops and we can get a feel for if she is making the rapid improvement that she is hoping for.        "

## 2025-03-25 NOTE — PROGRESS NOTES
Subjective:      Patient ID: Amara Reis is a 39 y.o. female.    Vitals:  vitals were not taken for this visit.     Chief Complaint: No chief complaint on file.    Downtime note.    Patient presenting to urgent care for evaluation of right ear pain, and drainage.  She states this morning she woke up with blood draining from right ear.  Denies recent URI, antecedent flights or diving, Q-tip use, or altitude shifting.  Was unable to equalize.  States it feels like something moving in ear.  Also reports history of PE tube recently.      Constitution: Negative for fever and international travel in last 60 days.   HENT:  Positive for ear pain and ear discharge. Negative for congestion.    Respiratory:  Negative for cough.       Objective:     Physical Exam   Constitutional: She is oriented to person, place, and time. She is cooperative.   HENT:   Head: Normocephalic and atraumatic.   Ears:   Left Ear: Tympanic membrane, external ear and ear canal normal.      Comments: Unable to fully visualize TM due to amount of blood, and caseous material within EAC.  Nose: Nose normal.   Mouth/Throat: Uvula is midline, oropharynx is clear and moist and mucous membranes are normal. Mucous membranes are moist. Oropharynx is clear.   Eyes: Conjunctivae and lids are normal. Pupils are equal, round, and reactive to light. Extraocular movement intact   Neck: Trachea normal and phonation normal. Neck supple.   Cardiovascular: Normal rate, regular rhythm, normal heart sounds and normal pulses.   Pulmonary/Chest: Effort normal and breath sounds normal.   Abdominal: Normal appearance.   Neurological: no focal deficit. She is alert, oriented to person, place, and time and at baseline. She has normal motor skills, normal sensation and intact cranial nerves (2-12). Gait and coordination normal. GCS eye subscore is 4. GCS verbal subscore is 5. GCS motor subscore is 6.   Skin: Skin is warm and dry. Capillary refill takes 2 to 3 seconds.    Psychiatric: She experiences Normal attention and Normal perception. Her speech is normal and behavior is normal. Mood, judgment and thought content normal.   Nursing note and vitals reviewed.      Assessment:     1. Acute otitis media with perforation, right        Plan:       Acute otitis media with perforation, right          Medical Decision Making:   Initial Assessment:   Unable to visualize TM.  Suspect perf.  Place patient on Augmentin and then instructed her to follow up with her ENT doctor with completion.

## 2025-03-26 ENCOUNTER — PATIENT OUTREACH (OUTPATIENT)
Dept: FAMILY MEDICINE | Facility: CLINIC | Age: 40
End: 2025-03-26
Payer: COMMERCIAL

## 2025-03-26 VITALS — SYSTOLIC BLOOD PRESSURE: 124 MMHG | DIASTOLIC BLOOD PRESSURE: 84 MMHG

## 2025-03-28 ENCOUNTER — OFFICE VISIT (OUTPATIENT)
Dept: OTOLARYNGOLOGY | Facility: CLINIC | Age: 40
End: 2025-03-28
Payer: COMMERCIAL

## 2025-03-28 VITALS — WEIGHT: 157.88 LBS | BODY MASS INDEX: 30.99 KG/M2 | HEIGHT: 60 IN

## 2025-03-28 DIAGNOSIS — H92.21 BLOOD IN RIGHT EAR CANAL: Primary | ICD-10-CM

## 2025-03-28 DIAGNOSIS — Z45.89 TYMPANOSTOMY TUBE CHECK: ICD-10-CM

## 2025-03-28 PROCEDURE — 99999 PR PBB SHADOW E&M-EST. PATIENT-LVL III: CPT | Mod: PBBFAC,,, | Performed by: OTOLARYNGOLOGY

## 2025-03-28 NOTE — PROGRESS NOTES
"Subjective:       Patient ID: Amara Reis is a 39 y.o. female.    Chief Complaint: Ear Problem (Patient here to fo/up on ear. States, "theres still some blood, its bleeding a little bit." )      This patient, she has a T-tube placed in the past and it was last seen in placed by Nikolai in August of last year an in general she has done pretty well.  It was originally placed for a persistent serous effusion with a resulting hearing loss, probably by Dr. Ward in [place]     I saw her just a few days ago it started bleeding and was hurting it was unclear what was going on I put her on Cipro drops she tells me that is feeling better and she is here to reexamine             Objective:      ENT Physical Exam    So I can see most of the eardrum and it looks normal inferiorly there still a crust of blood probably on the shank of the T-tube and the inferior most aspect of the TM isn't visible probably where the T-tube is present but I can not see for sure fact issues doing quite a lot better as far as discomfort goes and the exam is easier because much less blood in the ear canal        Assessment:       1. Blood in right ear canal    2. Tympanostomy tube check         Plan:          She is doing better and think for now continues the eardrops and just lets me know if things do not returned to their usual condition    We did discuss the potential role to remove the tube if it looks like that is causing more trouble than the benefits that is provided, in the uncertainty about if the hole would heal over and when it does if the effusion that it was placed for would return and the hope is that if things returned to a condition where it is not bothering her to leave it alone.      She reminds me that I tried to remove it in the office perhaps but it was too sensitive.        "

## 2025-03-31 RX ORDER — AZELASTINE 1 MG/ML
SPRAY, METERED NASAL 2 TIMES DAILY
COMMUNITY
Start: 2025-02-10 | End: 2025-04-01 | Stop reason: SDUPTHER

## 2025-03-31 RX ORDER — AMOXICILLIN AND CLAVULANATE POTASSIUM 875; 125 MG/1; MG/1
1 TABLET, FILM COATED ORAL 2 TIMES DAILY
COMMUNITY
Start: 2025-03-25 | End: 2025-04-01 | Stop reason: ALTCHOICE

## 2025-04-01 ENCOUNTER — OFFICE VISIT (OUTPATIENT)
Dept: FAMILY MEDICINE | Facility: CLINIC | Age: 40
End: 2025-04-01
Payer: COMMERCIAL

## 2025-04-01 VITALS
HEART RATE: 80 BPM | BODY MASS INDEX: 30.64 KG/M2 | TEMPERATURE: 99 F | HEIGHT: 60 IN | RESPIRATION RATE: 20 BRPM | WEIGHT: 156.06 LBS | DIASTOLIC BLOOD PRESSURE: 88 MMHG | SYSTOLIC BLOOD PRESSURE: 128 MMHG

## 2025-04-01 DIAGNOSIS — E55.9 VITAMIN D DEFICIENCY: ICD-10-CM

## 2025-04-01 DIAGNOSIS — J30.2 SEASONAL ALLERGIES: ICD-10-CM

## 2025-04-01 DIAGNOSIS — B37.9 ANTIBIOTIC-INDUCED YEAST INFECTION: Primary | ICD-10-CM

## 2025-04-01 DIAGNOSIS — T36.95XA ANTIBIOTIC-INDUCED YEAST INFECTION: Primary | ICD-10-CM

## 2025-04-01 DIAGNOSIS — Z91.018 FOOD ALLERGY: ICD-10-CM

## 2025-04-01 DIAGNOSIS — B37.0 THRUSH: ICD-10-CM

## 2025-04-01 PROCEDURE — 3074F SYST BP LT 130 MM HG: CPT | Mod: CPTII,S$GLB,, | Performed by: NURSE PRACTITIONER

## 2025-04-01 PROCEDURE — 3008F BODY MASS INDEX DOCD: CPT | Mod: CPTII,S$GLB,, | Performed by: NURSE PRACTITIONER

## 2025-04-01 PROCEDURE — 99999 PR PBB SHADOW E&M-EST. PATIENT-LVL IV: CPT | Mod: PBBFAC,,, | Performed by: NURSE PRACTITIONER

## 2025-04-01 PROCEDURE — 1159F MED LIST DOCD IN RCRD: CPT | Mod: CPTII,S$GLB,, | Performed by: NURSE PRACTITIONER

## 2025-04-01 PROCEDURE — 3079F DIAST BP 80-89 MM HG: CPT | Mod: CPTII,S$GLB,, | Performed by: NURSE PRACTITIONER

## 2025-04-01 PROCEDURE — 99214 OFFICE O/P EST MOD 30 MIN: CPT | Mod: S$GLB,,, | Performed by: NURSE PRACTITIONER

## 2025-04-01 RX ORDER — NYSTATIN 100000 [USP'U]/ML
4 SUSPENSION ORAL 4 TIMES DAILY
Qty: 160 ML | Refills: 0 | Status: SHIPPED | OUTPATIENT
Start: 2025-04-01 | End: 2025-04-11

## 2025-04-01 RX ORDER — ERGOCALCIFEROL 1.25 MG/1
50000 CAPSULE ORAL
Qty: 4 CAPSULE | Refills: 5 | Status: SHIPPED | OUTPATIENT
Start: 2025-04-01

## 2025-04-01 RX ORDER — FLUCONAZOLE 150 MG/1
150 TABLET ORAL DAILY
Qty: 1 TABLET | Refills: 0 | Status: SHIPPED | OUTPATIENT
Start: 2025-04-01 | End: 2025-04-02

## 2025-04-01 RX ORDER — CETIRIZINE HYDROCHLORIDE 10 MG/1
10 TABLET ORAL DAILY
Qty: 30 TABLET | Refills: 11 | Status: SHIPPED | OUTPATIENT
Start: 2025-04-01 | End: 2026-04-01

## 2025-04-01 RX ORDER — AZELASTINE 1 MG/ML
1 SPRAY, METERED NASAL 2 TIMES DAILY
Qty: 30 ML | Refills: 2 | Status: SHIPPED | OUTPATIENT
Start: 2025-04-01

## 2025-04-01 NOTE — PROGRESS NOTES
Patient ID: Amara Reis is a 39 y.o. female.    Chief Complaint: Follow-up (40 yo female here of 6 month recheck. KM )    History of Present Illness    CHIEF COMPLAINT:  Ms. Reis presents with ongoing ear issues, including bleeding and possible infection, as well as concerns about food allergies.    HPI:  Ms. Reis reports ongoing issues with her ear, including bleeding and possible infection. She was prescribed Cipro drops on the 25th for her ear. The bleeding has improved but persists, evidenced by blood on cotton balls used to clean the ear. She believes she may have developed thrush, noting altered taste and a white appearance of her tongue.    Ms. Reis reports allergic reactions to seafood (crawfish, shrimp, and crabs) and pineapple, which causes throat swelling. She takes Benadryl every time she consumes seafood to manage symptoms. She was evaluated by an allergist (Dr. Slaughter) who performed allergy testing, but the results were normal. Ms. Reis expresses frustration that the allergist did not investigate further given her persistent symptoms.    Ms. Reis mentions a previous severe illness episode after giving birth, where she had severe thrush causing pain while eating and significant weight loss. She reports being placed on hospice care during this time due to the severity and unknown cause of her symptoms. She also developed C. diff after being prescribed multiple courses of antibiotics, including Cipro.    Ms. Reis denies any allergies detected in recent allergy panel testing.    MEDICATIONS:  Ms. Reis started Cipro drops on the 25th for an ear infection. She is on Vitamin D, taking a 50,000 unit capsule once a week, and Naproxen. Augmentin was discontinued by Dr. Reeder due to being too strong.    MEDICAL HISTORY:  Ms. Reis has a history of C. diff and thrush.    TEST RESULTS:  Ms. Reis's allergy panel showed normal results. Her Vitamin D levels are being managed with a  current prescription of a 50,000 unit capsule once a week. Ms. Reis underwent a skin allergy test, which yielded normal results.    ALLERGIES:  Ms. Reis is allergic to seafood, specifically crawfish, shrimp, and crabs, which cause her to break out and require Benadryl. She also has allergies to pineapple, which causes throat swelling, and kiwi, though the specific reaction is not specified.      ROS:  General: -fever, -chills, -fatigue, -weight gain, -weight loss  Eyes: -vision changes, -redness, -discharge  ENT: -ear pain, -nasal congestion, -sore throat, +ear discharge, +mouth lesions  Cardiovascular: -chest pain, -palpitations, -lower extremity edema  Respiratory: -cough, -shortness of breath  Gastrointestinal: -abdominal pain, -nausea, -vomiting, -diarrhea, -constipation, -blood in stool  Genitourinary: -dysuria, -hematuria, -frequency  Musculoskeletal: +joint pain, -muscle pain, +limb pain  Skin: -rash, -lesion  Neurological: -headache, -dizziness, -numbness, -tingling  Psychiatric: -anxiety, -depression, -sleep difficulty  Allergic: +food allergies, +allergic reactions             Past Medical History:   Diagnosis Date    Anemia     Clostridium difficile colitis     hx--reoccurs with antibiotic use    Hypertrophy of nasal turbinates 02/18/2019     Past Surgical History:   Procedure Laterality Date    DILATION AND CURETTAGE OF UTERUS      EXCISION OF LESION OF NOSE Bilateral 02/18/2019    Procedure: EXCISION, LESION, NOSE;  Surgeon: Chadd Ward MD;  Location: [facility] OR;  Service: ENT;  Laterality: Bilateral;    LAPAROSCOPY      Laparoscopy x 12 for ovarian cysts, endometriosis, laser vaporization of uterosacral nerve complexes (7/26/2013)    LAPAROTOMY      laparotomy for possible myomectomy    MYRINGOTOMY WITH INSERTION OF VENTILATION TUBE Right 02/18/2019    Procedure: MYRINGOTOMY, WITH TYMPANOSTOMY TUBE INSERTION;  Surgeon: Chadd Ward MD;  Location: [facility] OR;  Service: ENT;  Laterality: Right;          Tobacco History:  reports that she has never smoked. She has never been exposed to tobacco smoke. She has never used smokeless tobacco.      Review of patient's allergies indicates:   Allergen Reactions    Morphine Hives, Itching and Nausea And Vomiting    Flagyl [metronidazole hcl] Other (See Comments)     Oral form only--burns her   stomach. Take IV only.  Had to have surgery to fix this     Current Medications[1]           Objective:      Vitals:    04/01/25 1339   BP: 128/88   Pulse: 80   Resp: 20   Temp: 99 °F (37.2 °C)   TempSrc: Oral   Weight: 70.8 kg (156 lb 1.4 oz)   Height: 5' (1.524 m)     Physical Exam  Constitutional:       Appearance: Normal appearance.   HENT:      Right Ear: Drainage and tenderness present. A middle ear effusion is present. Tympanic membrane is scarred and erythematous.   Cardiovascular:      Rate and Rhythm: Normal rate and regular rhythm.   Neurological:      Mental Status: She is alert and oriented to person, place, and time. Mental status is at baseline.           Assessment:       1. Antibiotic-induced yeast infection    2. Thrush    3. Food allergy    4. Vitamin D deficiency    5. Seasonal allergies           Plan:       Assessment & Plan    H92.21 Otorrhagia, right ear  B37.0 Candidal stomatitis  E55.9 Vitamin D deficiency, unspecified  Z91.018 Allergy to other foods    IMPRESSION:  - Assessed ear condition, noting blood pooling at bottom of ear canal and potential tissue involvement.  - Considered risks of irrigation due to fragility of eardrum and existing bleeding.  - Evaluated allergy panel results, which came back normal despite reported seafood and pineapple reactions.  - Considered EpiPen for management of potential allergic reactions, despite negative allergy test results.  - Discussed possibility of inflammatory processes causing throat discomfort after consuming certain foods, despite negative allergy tests.    RIGHT EAR BLEEDING (OTORRHAGIA):  - Explained  risks associated with irrigating a bleeding ear.  - Continue Cipro ear drops, initiated on 25th.  - Ms. Reis reports ongoing bleeding from the right ear, though less severe than before.  - Observed blood pooled at the bottom of the ear canal during exam.  - Assessed the condition of the ear, noting the presence of blood and possible tissue.  - Advised against ear irrigation due to potential trauma.  - Instructed the patient to follow up if symptoms worsen or persist.    ORAL THRUSH (CANDIDAL STOMATITIS):  - Prescribed antifungal medication for oral thrush.  - Ms. Reis reports symptoms consistent with oral thrush, including altered taste and white appearance on tongue.  - Educated the patient about proper administration of the antifungal medication.  - Advised the patient to maintain good oral hygiene during treatment.    VITAMIN D DEFICIENCY:  - Continue Vitamin D 50,000 unit capsule once weekly.  - Instructed the patient on the importance of consistent supplementation.  - Recommend follow up in 3 months to reassess Vitamin D levels.    FOOD ALLERGIES:  - Ms. Reis reports allergic reactions to seafood and certain fruits, including throat swelling with pineapple and kiwi.  - Noted that previous allergy panel came back normal, but patient still experiences symptoms.  - Acknowledged the patient's reported symptoms despite normal test results.  - Advised the patient to continue taking Benadryl prophylactically before consuming seafood to manage symptoms.  - Discussed the importance of obtaining an epinephrine auto-injector (EpiPen) for potential severe reactions.  - Referred the patient to an allergist for further evaluation and management of food allergies.  - Educated the patient on the importance of avoiding known allergens and recognizing early signs of anaphylaxis.         Antibiotic-induced yeast infection  -     fluconazole (DIFLUCAN) 150 MG Tab; Take 1 tablet (150 mg total) by mouth once daily. for 1  day  Dispense: 1 tablet; Refill: 0    Thrush  -     nystatin (MYCOSTATIN) 100,000 unit/mL suspension; Take 4 mLs (400,000 Units total) by mouth 4 (four) times daily. for 10 days  Dispense: 160 mL; Refill: 0    Food allergy  -     Ambulatory referral/consult to Allergy; Future; Expected date: 04/08/2025    Vitamin D deficiency  -     ergocalciferol (VITAMIN D2) 50,000 unit Cap; Take 1 capsule (50,000 Units total) by mouth every 7 days.  Dispense: 4 capsule; Refill: 5    Seasonal allergies  -     azelastine (ASTELIN) 137 mcg (0.1 %) nasal spray; 1 spray (137 mcg total) by Nasal route 2 (two) times daily.  Dispense: 30 mL; Refill: 2  -     cetirizine (ZYRTEC) 10 MG tablet; Take 1 tablet (10 mg total) by mouth once daily.  Dispense: 30 tablet; Refill: 11      Follow up in about 6 months (around 10/1/2025), or if symptoms worsen or fail to improve.      Spent johnnie 30 minutes with patient which involved review of pts medical conditions, labs, medications and with 50% of time face-to-face discussion about medical problems, management and any applicable changes.   4/1/2025 Sejal Guerrero NP    This note was generated with the assistance of ambient listening technology. Verbal consent was obtained by the patient and accompanying visitor(s) for the recording of patient appointment to facilitate this note. I attest to having reviewed and edited the generated note for accuracy, though some syntax or spelling errors may persist. Please contact the author of this note for any clarification.             [1]   Current Outpatient Medications:     ciprofloxacin HCl (CILOXAN) 0.3 % ophthalmic solution, 3 drops into affected ear tid... EAR, Disp: 10 mL, Rfl: 3    prenatal no115/iron/folic acid (PRENATAL 19 ORAL), Take by mouth., Disp: , Rfl:     [START ON 4/4/2025] semaglutide, weight loss, (WEGOVY) 1 mg/0.5 mL PnIj, Inject 1 mg into the skin every 7 days., Disp: 2 mL, Rfl: 0    tranexamic acid (LYSTEDA) 650 mg tablet, Take 2  tablets (1,300 mg total) by mouth 3 (three) times daily. Take for up to 5 days during your period (you will use up to 30 tablets per menstrual cycle)., Disp: 30 tablet, Rfl: 1    azelastine (ASTELIN) 137 mcg (0.1 %) nasal spray, 1 spray (137 mcg total) by Nasal route 2 (two) times daily., Disp: 30 mL, Rfl: 2    cetirizine (ZYRTEC) 10 MG tablet, Take 1 tablet (10 mg total) by mouth once daily., Disp: 30 tablet, Rfl: 11    ergocalciferol (VITAMIN D2) 50,000 unit Cap, Take 1 capsule (50,000 Units total) by mouth every 7 days., Disp: 4 capsule, Rfl: 5    fluconazole (DIFLUCAN) 150 MG Tab, Take 1 tablet (150 mg total) by mouth once daily. for 1 day, Disp: 1 tablet, Rfl: 0    multivit 47-iron-folate 1-dha (WESCAP-PN DHA) 27 mg iron-1 mg -300 mg Cap, Take 1 tablet by mouth once daily., Disp: 30 capsule, Rfl: 5    nystatin (MYCOSTATIN) 100,000 unit/mL suspension, Take 4 mLs (400,000 Units total) by mouth 4 (four) times daily. for 10 days, Disp: 160 mL, Rfl: 0    semaglutide, weight loss, (WEGOVY) 0.5 mg/0.5 mL PnIj, Inject 0.5 mg into the skin every 7 days., Disp: 2 mL, Rfl: 0    [START ON 5/4/2025] semaglutide, weight loss, (WEGOVY) 1.7 mg/0.75 mL PnIj, Inject 1.7 mg into the skin every 7 days., Disp: 2 mL, Rfl: 1

## 2025-04-22 ENCOUNTER — OFFICE VISIT (OUTPATIENT)
Dept: UROGYNECOLOGY | Facility: CLINIC | Age: 40
End: 2025-04-22
Payer: COMMERCIAL

## 2025-04-22 ENCOUNTER — TELEPHONE (OUTPATIENT)
Dept: UROGYNECOLOGY | Facility: CLINIC | Age: 40
End: 2025-04-22

## 2025-04-22 DIAGNOSIS — N39.3 SUI (STRESS URINARY INCONTINENCE, FEMALE): ICD-10-CM

## 2025-04-22 DIAGNOSIS — R35.0 URINARY FREQUENCY: ICD-10-CM

## 2025-04-22 DIAGNOSIS — R32 URINARY INCONTINENCE IN FEMALE: Primary | ICD-10-CM

## 2025-04-22 LAB
BILIRUBIN, UA POC OHS: NEGATIVE
BLOOD, UA POC OHS: ABNORMAL
CLARITY, UA POC OHS: CLEAR
COLOR, UA POC OHS: YELLOW
GLUCOSE, UA POC OHS: NEGATIVE
KETONES, UA POC OHS: NEGATIVE
LEUKOCYTES, UA POC OHS: NEGATIVE
NITRITE, UA POC OHS: NEGATIVE
PH, UA POC OHS: 6
PROTEIN, UA POC OHS: ABNORMAL
SPECIFIC GRAVITY, UA POC OHS: >=1.03
UROBILINOGEN, UA POC OHS: 0.2

## 2025-04-22 PROCEDURE — 99999 PR PBB SHADOW E&M-EST. PATIENT-LVL III: CPT | Mod: PBBFAC,,, | Performed by: NURSE PRACTITIONER

## 2025-04-22 NOTE — PROGRESS NOTES
Subjective:       Patient ID: Amara Reis is a 39 y.o. female.    Chief Complaint: Urinary Incontinence      Amara eRis is a 39 y.o. female.  Who is new to me, presents today for consult in regards to urinary incontinence.  The patient states that she had issues with stress incontinence after her 1st child as during delivery her urethra was split she states that she felt things were fairly well-maintained until recently.  She was pregnant again but ended up having a miscarriage in January.  She feels that her JOEL has gotten worse since the miscarriage.  The patient is wanting to attempt pregnancy again.  NP did review with patient that medication or surgical intervention with incontinence at this point would not be recommended.  The patient has frequency about every 2-3 hours during the day.  She has nocturia times 1-2.  She has positive JOEL with cough laugh sneeze but denies any real UUI.  She has some PVF at times.  She denies any recurrent UTI or kidney stones.  She drinks primarily slushes Gatorade and water.  She recently saw Dr. Mendoza on 03/07/2025 and did not have any vaginal concerns at that point.  NP did review the option of pelvic floor physical therapy which would probably be the best option at this point in time.  Patient is amenable to this idea and would like to try this.    Review of Systems   Constitutional:  Negative for activity change, fever and unexpected weight change.   HENT:  Negative for hearing loss.    Eyes:  Negative for visual disturbance.   Respiratory:  Negative for shortness of breath and wheezing.    Cardiovascular:  Negative for chest pain, palpitations and leg swelling.   Gastrointestinal:  Negative for abdominal pain, constipation and diarrhea.   Genitourinary:  Positive for frequency. Negative for dyspareunia, dysuria, urgency, vaginal bleeding and vaginal discharge.   Musculoskeletal:  Negative for gait problem and neck pain.   Skin:  Negative for rash and wound.    Allergic/Immunologic: Negative for immunocompromised state.   Neurological:  Negative for tremors, speech difficulty and weakness.   Hematological:  Does not bruise/bleed easily.   Psychiatric/Behavioral:  Negative for agitation and confusion.        Objective:      Physical Exam  Vitals reviewed. Exam conducted with a chaperone present.   Constitutional:       General: She is not in acute distress.     Appearance: She is well-developed.   HENT:      Head: Normocephalic and atraumatic.   Neck:      Thyroid: No thyromegaly.   Pulmonary:      Effort: Pulmonary effort is normal. No respiratory distress.   Abdominal:      Palpations: Abdomen is soft.      Tenderness: There is no abdominal tenderness.      Hernia: No hernia is present.   Musculoskeletal:         General: Normal range of motion.      Cervical back: Normal range of motion.   Skin:     General: Skin is warm and dry.      Findings: No rash.   Neurological:      Mental Status: She is alert and oriented to person, place, and time.   Psychiatric:         Mood and Affect: Mood normal.         Behavior: Behavior normal.         Thought Content: Thought content normal.       Pelvic Exam:  Deferred at this time      Assessment:       1. Urinary incontinence in female    2. Urinary frequency    3. JOEL (stress urinary incontinence, female)        Plan:       Urinary incontinence in female we will have her try pelvic floor PT  -     Ambulatory referral/consult to Urogynecology  -     POCT Urinalysis(Instrument)    Urinary frequency trial of pelvic floor PT as noted below  -     Ambulatory Referral/Consult to Physical Therapy; Future; Expected date: 04/29/2025    JOEL (stress urinary incontinence, female) trial of pelvic floor PT  -     Ambulatory Referral/Consult to Physical Therapy; Future; Expected date: 04/29/2025      RTC 3 months

## 2025-04-22 NOTE — TELEPHONE ENCOUNTER
States that it is her ex  and  that they both went in to the dr on the same day.  Does not want to even talk to ex  , does not care it is on there just knows  she did not have it done.

## 2025-04-22 NOTE — TELEPHONE ENCOUNTER
Please let the patient know that I did look into trying to get the nasal surgery removed from her profile.  I was told that since the surgeon of record documented it under her name there is not a way to delete this.  She would need to contact the surgeon or his office to get it listed under her 's name instead.

## 2025-05-28 ENCOUNTER — TELEPHONE (OUTPATIENT)
Dept: UROGYNECOLOGY | Facility: CLINIC | Age: 40
End: 2025-05-28
Payer: COMMERCIAL

## 2025-05-28 ENCOUNTER — OFFICE VISIT (OUTPATIENT)
Dept: OBSTETRICS AND GYNECOLOGY | Facility: CLINIC | Age: 40
End: 2025-05-28
Payer: COMMERCIAL

## 2025-05-28 VITALS
BODY MASS INDEX: 30.64 KG/M2 | SYSTOLIC BLOOD PRESSURE: 130 MMHG | HEIGHT: 60 IN | DIASTOLIC BLOOD PRESSURE: 86 MMHG | WEIGHT: 156.06 LBS | OXYGEN SATURATION: 99 % | HEART RATE: 88 BPM

## 2025-05-28 DIAGNOSIS — Z71.3 WEIGHT LOSS COUNSELING, ENCOUNTER FOR: ICD-10-CM

## 2025-05-28 DIAGNOSIS — R35.0 URINARY FREQUENCY: ICD-10-CM

## 2025-05-28 DIAGNOSIS — N39.3 SUI (STRESS URINARY INCONTINENCE, FEMALE): Primary | ICD-10-CM

## 2025-05-28 DIAGNOSIS — E66.9 OBESITY (BMI 30-39.9): ICD-10-CM

## 2025-05-28 DIAGNOSIS — R30.0 DYSURIA: Primary | ICD-10-CM

## 2025-05-28 DIAGNOSIS — E56.9 VITAMIN DEFICIENCY: ICD-10-CM

## 2025-05-28 LAB
BILIRUBIN, UA POC OHS: NEGATIVE
BLOOD, UA POC OHS: NEGATIVE
CLARITY, UA POC OHS: ABNORMAL
COLOR, UA POC OHS: YELLOW
GLUCOSE, UA POC OHS: NEGATIVE
KETONES, UA POC OHS: NEGATIVE
LEUKOCYTES, UA POC OHS: NEGATIVE
NITRITE, UA POC OHS: NEGATIVE
PH, UA POC OHS: 7.5
PROTEIN, UA POC OHS: 30
SPECIFIC GRAVITY, UA POC OHS: 1.02
UROBILINOGEN, UA POC OHS: 1

## 2025-05-28 PROCEDURE — 1159F MED LIST DOCD IN RCRD: CPT | Mod: CPTII,S$GLB,, | Performed by: FAMILY MEDICINE

## 2025-05-28 PROCEDURE — 99999 PR PBB SHADOW E&M-EST. PATIENT-LVL III: CPT | Mod: PBBFAC,,, | Performed by: FAMILY MEDICINE

## 2025-05-28 PROCEDURE — 81003 URINALYSIS AUTO W/O SCOPE: CPT | Mod: QW,S$GLB,, | Performed by: FAMILY MEDICINE

## 2025-05-28 PROCEDURE — 3075F SYST BP GE 130 - 139MM HG: CPT | Mod: CPTII,S$GLB,, | Performed by: FAMILY MEDICINE

## 2025-05-28 PROCEDURE — 99213 OFFICE O/P EST LOW 20 MIN: CPT | Mod: S$GLB,,, | Performed by: FAMILY MEDICINE

## 2025-05-28 PROCEDURE — 87086 URINE CULTURE/COLONY COUNT: CPT | Performed by: FAMILY MEDICINE

## 2025-05-28 PROCEDURE — 3008F BODY MASS INDEX DOCD: CPT | Mod: CPTII,S$GLB,, | Performed by: FAMILY MEDICINE

## 2025-05-28 PROCEDURE — 3079F DIAST BP 80-89 MM HG: CPT | Mod: CPTII,S$GLB,, | Performed by: FAMILY MEDICINE

## 2025-05-28 RX ORDER — SEMAGLUTIDE 1.7 MG/.75ML
1.7 INJECTION, SOLUTION SUBCUTANEOUS
Qty: 9 ML | Refills: 0 | Status: SHIPPED | OUTPATIENT
Start: 2025-05-28

## 2025-05-28 RX ORDER — NITROFURANTOIN 25; 75 MG/1; MG/1
100 CAPSULE ORAL 2 TIMES DAILY
Qty: 14 CAPSULE | Refills: 0 | Status: SHIPPED | OUTPATIENT
Start: 2025-05-28 | End: 2025-06-04

## 2025-05-28 RX ORDER — ASCORBIC ACID, CHOLECALCIFEROL, .ALPHA.-TOCOPHEROL ACETATE, DL-, PYRIDOXINE, FOLIC ACID, CYANOCOBALAMIN, CALCIUM, FERROUS FUMARATE, MAGNESIUM, DOCONEXENT 85; 200; 10; 25; 1; 12; 140; 27; 45; 300 [IU]/1; [IU]/1; [IU]/1; [IU]/1; MG/1; UG/1; MG/1; MG/1; MG/1; MG/1
1 CAPSULE, GELATIN COATED ORAL
Qty: 30 CAPSULE | Refills: 5 | Status: SHIPPED | OUTPATIENT
Start: 2025-05-28

## 2025-05-28 NOTE — TELEPHONE ENCOUNTER
Care Due:                  Date            Visit Type   Department     Provider  --------------------------------------------------------------------------------                                [facility]      [address]  Last Visit: 02-      [facility] ([facility])   [facility] [address]  Next Visit: 08-      [facility]   [facility]                                                            Last  Test          Frequency    Reason                     Performed    Due Date  --------------------------------------------------------------------------------    HBA1C.......  6 months...  semaglutide,.............  12- 06-    [facility] Embedded Care Due Messages. Reference number: [id number].   5/28/2025 10:41:09 AM CDT

## 2025-05-28 NOTE — PROGRESS NOTES
HISTORY OF THE PRESENT ILLNESS    2025  Amara Reis is a 39 y.o. here for Urinary Tract Infection  She presents today with complaints of possible UTI.     G'sP's:   LMP: Patient's last menstrual period was 2025 (approximate).  Relationship:   Contraception: nothing (desires pregnancy)    PAP'S: remote h/o abnormal & cleared to routine surveillance  PAP: PAP neg / HPV neg / Date: 11/3/2024     LABS & RADS   Lab Results   Component Value Date    WBC 8.32 2025    HGB 11.3 (L) 2025    HCT 34.9 (L) 2025     2025    MCV 90 2025      Lab Results   Component Value Date    TSH 0.986 2024    LABLH 3.8 2016    FSH 1.90 2016      Lab Results   Component Value Date    LABURIN No significant growth 2024     Lab Results   Component Value Date    HEPCAB Negative 2024    TREPABIGMIGG Nonreactive 2024    HEPBSAG Negative 2024    SWZ29BFGF Negative 2024     Lab Results   Component Value Date    LABCHLA Not Detected 2024    LABNGO Not Detected 2024          GYNECOLOGIC HISTORY  Had abnormal pap around , no biopsy or treatment, negative pap since.    Genital HSV: no  STD Hx: denies    Age of Menarche:13  Age at first pregnancy:18   Age at first live birth:22  Age at Menopause:      Period duration: 3-5 days  # Heavy Days: none  Pad/tampon ? on heavy days: hourly if not takingTXA (been taking for over a year)  Intermenstrual bleeding: No  Period frequency:regular every 28-30 days    OBSTETRIC HISTORY  OB History    Para Term  AB Living   5 3 3  2 3   SAB IAB Ectopic Multiple Live Births   2   0 3      # Outcome Date GA Lbr Black/2nd Weight Sex Type Anes PTL Lv   5 Term 22 38w4d 11:45 / 00:37 2.761 kg (6 lb 1.4 oz) F Vag-Spont EPI N ANGUS   4 SAB 2020           3 Term 2011 39w0d  3.175 kg (7 lb) F Vag-Spont  N ANGUS   2 Term 10/2008 37w0d  2.778 kg (6 lb 2 oz) M Vag-Spont   ANGUS   1 SAB                Obstetric Comments   Vaginal delivery x 3   SAB x 2       PAST MEDICAL HISTORY  -------------------------------------    Anemia    Clostridium difficile colitis    hx--reoccurs with antibiotic use    Hypertrophy of nasal turbinates         PAST SURGICAL HISTORY  ----------------------------    Dilation and curettage of uterus    Excision of lesion of nose    Procedure: EXCISION, LESION, NOSE;  Surgeon: Chadd Ward MD;  Location: [facility] OR;  Service: ENT;  Laterality: Bilateral;    Laparoscopy    Laparoscopy x 12 for ovarian cysts, endometriosis, laser vaporization of uterosacral nerve complexes (7/26/2013)    Laparotomy    laparotomy for possible myomectomy    Myringotomy with insertion of ventilation tube    Procedure: MYRINGOTOMY, WITH TYMPANOSTOMY TUBE INSERTION;  Surgeon: Chadd Ward MD;  Location: [facility] OR;  Service: ENT;  Laterality: Right;         ALLERGIES  Review of patient's allergies indicates:   Allergen Reactions    Morphine Hives, Itching and Nausea And Vomiting    Flagyl [metronidazole hcl] Other (See Comments)     Oral form only--burns her   stomach. Take IV only.  Had to have surgery to fix this       MEDICATIONS  Current Outpatient Medications   Medication Instructions    azelastine (ASTELIN) 137 mcg, Nasal, 2 times daily    cetirizine (ZYRTEC) 10 mg, Oral, Daily    ergocalciferol (VITAMIN D2) 50,000 Units, Oral, Every 7 days    nitrofurantoin, macrocrystal-monohydrate, (MACROBID) 100 MG capsule 100 mg, Oral, 2 times daily    PNV-DHA 27 mg iron-1 mg -300 mg Cap 1 capsule, Oral    prenatal no115/iron/folic acid (PRENATAL 19 ORAL) Take by mouth.    tranexamic acid (LYSTEDA) 1,300 mg, Oral, 3 times daily, Take for up to 5 days during your period (you will use up to 30 tablets per menstrual cycle).    WEGOVY 1.7 mg, Subcutaneous, Every 7 days       SOCIAL HISTORY  Social History[1]   Lives with:  and children  Domestic Violence: no  Occupation: homemaker      FAMILY  HISTORY  BLEEDING or  CLOTTING DISORDERS: none  BREAST CA: none  UTERINE CA: none  OVARIAN CA: none  COLON CA: none    REVIEW OF SYSTEMS  Review of Systems   Constitutional:  Negative for activity change, appetite change and fever.   Respiratory:  Negative for cough and shortness of breath.    Genitourinary:  Positive for dysuria and frequency. Negative for flank pain, pelvic pain, urgency and urinary incontinence. Vaginal discharge: egg white.  Psychiatric/Behavioral:  Negative for depression.      --------------------------------------------------------------------------------------------------------------    PHYSICAL EXAM  VITALS:  Vitals:    05/28/25 1503   BP: 130/86   BP Location: Right arm   Patient Position: Sitting   Pulse: 88   SpO2: 99%   Weight: 70.8 kg (156 lb 1.4 oz)   Height: 5' (1.524 m)     Exam conducted with a chaperone present.     Physical Exam:   Constitutional: She is oriented to person, place, and time. She appears well-developed and well-nourished.    HENT:   Head: Normocephalic and atraumatic.    Eyes: Pupils are equal, round, and reactive to light. Conjunctivae and EOM are normal.      Pulmonary/Chest: Effort normal.                      Neurological: She is alert and oriented to person, place, and time.    Skin: Skin is warm and dry.    Psychiatric: She has a normal mood and affect. Her behavior is normal.          ASSESSMENT AND PLAN:  Amara Reis 39 y.o.   Amara was seen today for urinary tract infection.    Diagnoses and all orders for this visit:    Dysuria  -     POCT Urinalysis(Instrument)  -     Urine Culture High Risk  -     nitrofurantoin, macrocrystal-monohydrate, (MACROBID) 100 MG capsule; Take 1 capsule (100 mg total) by mouth 2 (two) times daily. for 7 days      Cervical Cancer screening: next cervical CA screen (PAP+HPV) due Oct 2029  HPV Vaccine: declines    Mammogram: age 40    Patient was counseled today on :  Pap guidelines  Recommend periodic pelvic exams with  visual inspection and palpation  mammograms starting annually at age 40  Encouraged self breast awareness; RTC for breast concerns  Colonoscopy after the age of 50  Dexa Bone Scan and calcium and vitamin D supplementation in menopause and to see her PCP for other health maintenance.     RTC for periodic GYN exam, sooner prn      Helen Caban     Follow up if symptoms worsen or fail to improve.   Future Appointments   Date Time Provider Department Center   5/28/2025  4:00 PM Helen Caban, NP [facility]   5/29/2025  2:20 PM [facility]   7/29/2025  2:00 PM MAYNOR Barboza, [facility] [place]   8/4/2025  1:00 PM [facility]   10/1/2025  1:20 PM Sejal Guerrero[facility]         All of your core healthy metrics are met.            [1]   Social History  Tobacco Use    Smoking status: Never     Passive exposure: Never    Smokeless tobacco: Never   Substance Use Topics    Alcohol use: No     Comment: rarely    Drug use: Never

## 2025-05-28 NOTE — TELEPHONE ENCOUNTER
Patient states that she has not heard from the physical therapy  people. Looks like order was closed, can you put in another referral to Rhea Ward with PT.

## 2025-05-28 NOTE — TELEPHONE ENCOUNTER
Provider Staff:  Action required for this patient    Requires labs - A1c     Please see care gap opportunities below in Care Due Message.    Thanks!  [facility]     Appointments      Date Provider   Last Visit   2/4/2025 Hugh Horan MD   Next Visit   8/4/2025 Hugh Horan MD     Refill Decision Note   Amara Reis  is requesting a refill authorization.  Brief Assessment and Rationale for Refill:  Approve     Medication Therapy Plan:        Comments:     Note composed:11:06 AM 05/28/2025

## 2025-05-28 NOTE — TELEPHONE ENCOUNTER
----- Message from Norma sent at 5/28/2025  3:34 PM CDT -----  Type:  Appointment Request Name of Caller:PtWhen is the first available appointment?No accessWould the patient rather a call back or a response via [facility]? callRight On Interactive Call Back Number:[phone] Additional Information: pt called to request a appt for pelvic floor,order is needed to schedule please contact pt with further information

## 2025-05-29 ENCOUNTER — LAB VISIT (OUTPATIENT)
Dept: LAB | Facility: HOSPITAL | Age: 40
End: 2025-05-29
Attending: NURSE PRACTITIONER
Payer: COMMERCIAL

## 2025-05-29 ENCOUNTER — OFFICE VISIT (OUTPATIENT)
Dept: FAMILY MEDICINE | Facility: CLINIC | Age: 40
End: 2025-05-29
Payer: COMMERCIAL

## 2025-05-29 VITALS
DIASTOLIC BLOOD PRESSURE: 80 MMHG | BODY MASS INDEX: 30.09 KG/M2 | RESPIRATION RATE: 20 BRPM | SYSTOLIC BLOOD PRESSURE: 122 MMHG | WEIGHT: 153.25 LBS | TEMPERATURE: 99 F | HEART RATE: 92 BPM | HEIGHT: 60 IN

## 2025-05-29 DIAGNOSIS — M25.542 ARTHRALGIA OF BOTH HANDS: ICD-10-CM

## 2025-05-29 DIAGNOSIS — M25.541 ARTHRALGIA OF BOTH HANDS: Primary | ICD-10-CM

## 2025-05-29 DIAGNOSIS — R10.84 GENERALIZED ABDOMINAL PAIN: ICD-10-CM

## 2025-05-29 DIAGNOSIS — E66.811 CLASS 1 OBESITY DUE TO EXCESS CALORIES WITHOUT SERIOUS COMORBIDITY WITH BODY MASS INDEX (BMI) OF 30.0 TO 30.9 IN ADULT: ICD-10-CM

## 2025-05-29 DIAGNOSIS — M25.542 ARTHRALGIA OF BOTH HANDS: Primary | ICD-10-CM

## 2025-05-29 DIAGNOSIS — M25.541 ARTHRALGIA OF BOTH HANDS: ICD-10-CM

## 2025-05-29 DIAGNOSIS — E66.09 CLASS 1 OBESITY DUE TO EXCESS CALORIES WITHOUT SERIOUS COMORBIDITY WITH BODY MASS INDEX (BMI) OF 30.0 TO 30.9 IN ADULT: ICD-10-CM

## 2025-05-29 LAB
AMYLASE SERPL-CCNC: 85 UNIT/L (ref 20–110)
BACTERIA UR CULT: NO GROWTH
LIPASE SERPL-CCNC: 38 U/L (ref 4–60)
RHEUMATOID FACT SERPL-ACNC: <13 IU/ML

## 2025-05-29 PROCEDURE — 86038 ANTINUCLEAR ANTIBODIES: CPT

## 2025-05-29 PROCEDURE — 1159F MED LIST DOCD IN RCRD: CPT | Mod: CPTII,S$GLB,, | Performed by: NURSE PRACTITIONER

## 2025-05-29 PROCEDURE — 3074F SYST BP LT 130 MM HG: CPT | Mod: CPTII,S$GLB,, | Performed by: NURSE PRACTITIONER

## 2025-05-29 PROCEDURE — 99213 OFFICE O/P EST LOW 20 MIN: CPT | Mod: S$GLB,,, | Performed by: NURSE PRACTITIONER

## 2025-05-29 PROCEDURE — 86431 RHEUMATOID FACTOR QUANT: CPT

## 2025-05-29 PROCEDURE — 83690 ASSAY OF LIPASE: CPT

## 2025-05-29 PROCEDURE — 3008F BODY MASS INDEX DOCD: CPT | Mod: CPTII,S$GLB,, | Performed by: NURSE PRACTITIONER

## 2025-05-29 PROCEDURE — 99999 PR PBB SHADOW E&M-EST. PATIENT-LVL IV: CPT | Mod: PBBFAC,,, | Performed by: NURSE PRACTITIONER

## 2025-05-29 PROCEDURE — 3079F DIAST BP 80-89 MM HG: CPT | Mod: CPTII,S$GLB,, | Performed by: NURSE PRACTITIONER

## 2025-05-29 PROCEDURE — 82150 ASSAY OF AMYLASE: CPT

## 2025-05-29 PROCEDURE — 36415 COLL VENOUS BLD VENIPUNCTURE: CPT | Mod: PN

## 2025-05-29 RX ORDER — TIRZEPATIDE 2.5 MG/.5ML
2.5 INJECTION, SOLUTION SUBCUTANEOUS
Qty: 2 ML | Refills: 2 | Status: SHIPPED | OUTPATIENT
Start: 2025-05-29

## 2025-05-29 NOTE — PROGRESS NOTES
Patient ID: Amara Reis is a 39 y.o. female.    Chief Complaint: Hand Pain (40 yo female here c/o bilateral hand and feet pain times 1 month. Pt also c/o constant headaches times 1 week. KM)    History of Present Illness    CHIEF COMPLAINT:  Ms. Reis presents with concerns about abdominal pain and joint discomfort, particularly in the hands and feet.    HPI:  Ms. Reis reports recent severe abdominal pain, unresponsive to Tylenol and ibuprofen, leading her to use a heating pad for comfort. She was concerned about the possibility of appendicitis due to the pain intensity.    She describes ongoing joint pain, particularly in her hands and feet. She wakes up with hand pain and needs to adjust her hand position during the day to alleviate discomfort, noting occasional visible swelling.    Foot pain occurs primarily in the plantar and mid-foot regions, with asymmetrical severity during ambulation. The pain is not dependent on footwear, as she generally prefers to go barefoot due to discomfort.    No specific timeline for symptom onset is provided, but the abdominal pain episode was recent and the joint pain is an ongoing issue.    She denies any shooting pain in her foot.    MEDICATIONS:  Ms. Reis is on Tylenol and Ibuprofen as needed for pain management.    MEDICAL HISTORY:  Ms. Reis's last menstrual period (LMP) occurred one week earlier than expected.      ROS:  General: -fever, -chills, -fatigue, -weight gain, -weight loss  Eyes: -vision changes, -redness, -discharge  ENT: -ear pain, -nasal congestion, -sore throat  Cardiovascular: -chest pain, -palpitations, -lower extremity edema  Respiratory: -cough, -shortness of breath  Gastrointestinal: +abdominal pain, -nausea, -vomiting, -diarrhea, -constipation, -blood in stool  Genitourinary: -dysuria, -hematuria, -frequency  Musculoskeletal: -joint pain, -muscle pain, +pain with movement, +limb pain, +joint swelling  Skin: -rash, -lesion  Neurological:  -headache, -dizziness, -numbness, -tingling  Psychiatric: -anxiety, -depression, -sleep difficulty             Past Medical History:   Diagnosis Date    Anemia     Clostridium difficile colitis     hx--reoccurs with antibiotic use    Hypertrophy of nasal turbinates 02/18/2019     Past Surgical History:   Procedure Laterality Date    DILATION AND CURETTAGE OF UTERUS      EXCISION OF LESION OF NOSE Bilateral 02/18/2019    Procedure: EXCISION, LESION, NOSE;  Surgeon: Chadd Ward MD;  Location: [facility] OR;  Service: ENT;  Laterality: Bilateral;    LAPAROSCOPY      Laparoscopy x 12 for ovarian cysts, endometriosis, laser vaporization of uterosacral nerve complexes (7/26/2013)    LAPAROTOMY      laparotomy for possible myomectomy    MYRINGOTOMY WITH INSERTION OF VENTILATION TUBE Right 02/18/2019    Procedure: MYRINGOTOMY, WITH TYMPANOSTOMY TUBE INSERTION;  Surgeon: Chadd Ward MD;  Location: [facility] OR;  Service: ENT;  Laterality: Right;         Tobacco History:  reports that she has never smoked. She has never been exposed to tobacco smoke. She has never used smokeless tobacco.      Review of patient's allergies indicates:   Allergen Reactions    Morphine Hives, Itching and Nausea And Vomiting    Flagyl [metronidazole hcl] Other (See Comments)     Oral form only--burns her   stomach. Take IV only.  Had to have surgery to fix this     Current Medications[1]           Objective:      Vitals:    05/29/25 1448   BP: 122/80   Pulse: 92   Resp: 20   Temp: 99.2 °F (37.3 °C)   TempSrc: Oral   Weight: 69.5 kg (153 lb 3.5 oz)   Height: 5' (1.524 m)     Physical Exam  Vitals reviewed.   Constitutional:       General: She is not in acute distress.     Appearance: She is well-developed.   HENT:      Head: Normocephalic and atraumatic.      Right Ear: External ear normal.      Left Ear: External ear normal.   Eyes:      General:         Right eye: No discharge.         Left eye: No discharge.      Conjunctiva/sclera: Conjunctivae  normal.      Pupils: Pupils are equal, round, and reactive to light.   Cardiovascular:      Rate and Rhythm: Normal rate and regular rhythm.      Heart sounds: Normal heart sounds. No murmur heard.  Pulmonary:      Effort: Pulmonary effort is normal. No respiratory distress.      Breath sounds: Normal breath sounds.   Skin:     General: Skin is warm and dry.   Neurological:      Mental Status: She is oriented to person, place, and time. Mental status is at baseline.           Assessment:       1. Arthralgia of both hands    2. Generalized abdominal pain    3. Class 1 obesity due to excess calories without serious comorbidity with body mass index (BMI) of 30.0 to 30.9 in adult           Plan:       Assessment & Plan    M06.9 Rheumatoid arthritis, unspecified  R10.84 Generalized abdominal pain  M79.671 Pain in right foot  M79.672 Pain in left foot    RHEUMATOID ARTHRITIS:  - Ms. Reis reports joint swelling and pain in hands, sometimes waking up with hands hurting and needing to hold them out to relieve pain.  - Evaluated the hand pain and observed joint swelling.    ABDOMINAL PAIN:  - Ms. Reis reports severe abdominal pain, describing it as the worst pain experienced, and notes that menstrual cycle came down a week early.  - Evaluated abdominal pain symptoms.  - Ordered abdominal ultrasound as least invasive imaging option to evaluate pain and rule out potential causes.  - Will also check labs including liver and pancreas enzymes (e.g., amylase) to assess for inflammation or elevated levels.    RIGHT FOOT PAIN:  - Ms. Reis reports radiating pain in right foot that worsens with ambulation.  - Evaluated these symptoms.    LEFT FOOT PAIN:  - Ms. Reis reports radiating pain in left foot that worsens with ambulation.  - Evaluated these symptoms.         Arthralgia of both hands  -     Rheumatoid Factor; Future; Expected date: 05/29/2025  -     RUT Screen w/Reflex; Future; Expected date:  05/29/2025    Generalized abdominal pain  -     US Abdomen Complete; Future; Expected date: 05/29/2025  -     Amylase; Future; Expected date: 05/29/2025  -     Lipase; Future; Expected date: 05/29/2025    Class 1 obesity due to excess calories without serious comorbidity with body mass index (BMI) of 30.0 to 30.9 in adult  -     ZEPBOUND 2.5 mg/0.5 mL PnIj; Inject 2.5 mg into the skin every 7 days.  Dispense: 2 mL; Refill: 2      Follow up if symptoms worsen or fail to improve.        5/29/2025 Sejal Guerrero NP    This note was generated with the assistance of ambient listening technology. Verbal consent was obtained by the patient and accompanying visitor(s) for the recording of patient appointment to facilitate this note. I attest to having reviewed and edited the generated note for accuracy, though some syntax or spelling errors may persist. Please contact the author of this note for any clarification.             [1]   Current Outpatient Medications:     azelastine (ASTELIN) 137 mcg (0.1 %) nasal spray, 1 spray (137 mcg total) by Nasal route 2 (two) times daily., Disp: 30 mL, Rfl: 2    cetirizine (ZYRTEC) 10 MG tablet, Take 1 tablet (10 mg total) by mouth once daily., Disp: 30 tablet, Rfl: 11    ergocalciferol (VITAMIN D2) 50,000 unit Cap, Take 1 capsule (50,000 Units total) by mouth every 7 days., Disp: 4 capsule, Rfl: 5    PNV-DHA 27 mg iron-1 mg -300 mg Cap, TAKE one CAPSULE BY MOUTH ONCE DAILY, Disp: 30 capsule, Rfl: 5    prenatal no115/iron/folic acid (PRENATAL 19 ORAL), Take by mouth., Disp: , Rfl:     semaglutide, weight loss, (WEGOVY) 1.7 mg/0.75 mL PnIj, Inject 1.7 mg into the skin every 7 days., Disp: 9 mL, Rfl: 0    tranexamic acid (LYSTEDA) 650 mg tablet, Take 2 tablets (1,300 mg total) by mouth 3 (three) times daily. Take for up to 5 days during your period (you will use up to 30 tablets per menstrual cycle)., Disp: 30 tablet, Rfl: 1    nitrofurantoin, macrocrystal-monohydrate, (MACROBID) 100  MG capsule, Take 1 capsule (100 mg total) by mouth 2 (two) times daily. for 7 days (Patient not taking: Reported on 5/29/2025), Disp: 14 capsule, Rfl: 0    ZEPBOUND 2.5 mg/0.5 mL PnIj, Inject 2.5 mg into the skin every 7 days., Disp: 2 mL, Rfl: 2

## 2025-05-29 NOTE — TELEPHONE ENCOUNTER
Please call patient for P.T. appointment . Don't know why the referral was closed. Called yesterday saying have not heard from anyone.

## 2025-05-30 ENCOUNTER — RESULTS FOLLOW-UP (OUTPATIENT)
Dept: FAMILY MEDICINE | Facility: CLINIC | Age: 40
End: 2025-05-30

## 2025-05-30 LAB — ANA (OHS): NORMAL

## 2025-06-01 ENCOUNTER — HOSPITAL ENCOUNTER (OUTPATIENT)
Dept: RADIOLOGY | Facility: HOSPITAL | Age: 40
Discharge: HOME | End: 2025-06-01
Attending: NURSE PRACTITIONER
Payer: COMMERCIAL

## 2025-06-01 DIAGNOSIS — R10.84 GENERALIZED ABDOMINAL PAIN: ICD-10-CM

## 2025-06-01 PROCEDURE — 76700 US EXAM ABDOM COMPLETE: CPT | Mod: TC

## 2025-06-01 PROCEDURE — 76700 US EXAM ABDOM COMPLETE: CPT | Mod: 26,,, | Performed by: RADIOLOGY

## 2025-06-02 DIAGNOSIS — N93.9 ABNORMAL UTERINE BLEEDING (AUB): ICD-10-CM

## 2025-06-02 RX ORDER — TRANEXAMIC ACID 650 MG/1
TABLET ORAL
Qty: 30 TABLET | Refills: 1 | Status: SHIPPED | OUTPATIENT
Start: 2025-06-02

## 2025-06-06 ENCOUNTER — CLINICAL SUPPORT (OUTPATIENT)
Dept: REHABILITATION | Facility: HOSPITAL | Age: 40
End: 2025-06-06
Payer: COMMERCIAL

## 2025-06-06 DIAGNOSIS — N39.3 SUI (STRESS URINARY INCONTINENCE, FEMALE): ICD-10-CM

## 2025-06-06 DIAGNOSIS — M62.89 PELVIC FLOOR DYSFUNCTION: Primary | ICD-10-CM

## 2025-06-06 DIAGNOSIS — R35.0 URINARY FREQUENCY: ICD-10-CM

## 2025-06-06 PROCEDURE — 97161 PT EVAL LOW COMPLEX 20 MIN: CPT | Mod: PO

## 2025-06-06 PROCEDURE — 97530 THERAPEUTIC ACTIVITIES: CPT | Mod: PO

## 2025-06-06 NOTE — PATIENT INSTRUCTIONS
"    Home Exercise Program: 6/6/2025    1) Increase water to half your body weight in ounces (if you weigh 140#, drink 70 ounces)  2) Begin fiber (psyllium husk or metamucil) and Miralax each night     DIAPHRAGMATIC BREATHING  (do this daily, but also when you are feeling those sharp/shooting pains in the rectum)     The diaphragm is a dome shaped muscle that forms the floor of the rib cage. It is the most efficient muscle for breathing and relaxation, although most people are not used to using the diaphragm. Diaphragmatic or belly breathing is an important technique to learn because it helps settle down or relax the autonomic nervous system. The correct use of diaphragmatic breathing can help to quiet brain activity resulting in the relaxation of all the muscles and organs of the body. This is accomplished by slow rhythmic breathing concentrated in the diaphragm muscle rather than the chest.    How to do proper relaxation breathing:    Start by lying on your back or reclining in a chair in a relaxed position. Place one hand on your chest and the other on your abdomen.  Relax your jaw by placing your tongue on the floor of your mouth and keeping your teeth slightly apart.   Take a deep breath in, letting the abdomen expand and rise while you keep your upper chest, neck and shoulders relaxed.   As you breathe out, allow your abdomen and chest to fall. Exhale completely.  It doesn't matter if you breathe in/out through your nose and/or mouth. Do whichever feels comfortable.  Remember to breathe slowly.  Do not force your breathing. Do not hold your breath.  Repeat while doing stretches listed below:        "single knee to chest" - lay on your back, use your hands to pull your left knee to your chest, keeping the right leg straight on the bed. Hold this pose while you incorporate your diaphragmatic breathing. Repeat on the opposite side. Complete 3 sets of 10 breaths on both legs.         Butterfly stretch - lay on your " "back, knees bent and feet together. Let your knees open so they drop down towards the table. Think about relaxing in this position; perform belly breathing. Complete 3 sets of 10 breaths        "Happy Baby" - lay on your back. Open your knees slightly wider than your torso, then bring them up toward your armpits. Position each ankle directly over the knee, so your shins are perpendicular to the floor. Flex through the heels. Hold this pose while you incorporate your deep breathing. Complete 3 sets of 10 breaths.    HAPPY BABY MODIFICATION:    "Modified Happy Baby" - lay on your back, use your hands to pull your knees to your chest, then bring them out wide (about even with your shoulders).         "Child's Pose" - first start by getting onto your hands and knees, then move your feet so they are touching and your knees are wider than your hips. Sit back so that you are sitting on your heels and reach your arms forward. Think about resting in this position. Complete 3 sets of 10 breaths.      CHILD'S POSE VARIATIONS:                  Deep Squat - Start standing with your feet hip-width apart. Lower yourself down into a low squat position (pictured). Hold onto a sturdy piece of furniture if you need to so that you will feel comfortable enough to relax into this stretch. Try not to hold muscle tension in your hips or thighs as you incorporate your deep breathing in this position.   Complete 3 sets of 10 breaths.             Cat/Cow:   - start on hands/knees. You can make fists to protect your wrists.   - deep breath in as you drop you abdomen and create a curve in your back. Think about the pelvic outlet opening and your pelvic floor relaxing   - gently exhale (don't force air out) as you reverse the curve by rounding your back and pushing through your fists to extend your shoulders.     Complete 2 sets of 10 breaths      To go over in future visits:      Home Exercise Program: 06/06/2025    DOUBLE VOIDING - Double " voiding is a technique that may assist the bladder to empty more effectively when  urine is left in the bladder. It involves passing  urine more than once each time that you go to  the toilet. This makes sure that the bladder is  completely empty.    Here are 3 strategies you can try to fully empty your bladder.  You do not have to do all of these things every single time. Find which ones work best for you.     Check to make sure your pelvic floor is relaxed   Do a body scan - make sure your legs, buttocks, and abdominals are relaxed.  Take a couple deep, slow breaths to encourage your pelvic floor muscles to DROP (ie. Try Diaphragmatic Breathing).  Gently apply pressure over your bladder.  Change your pelvic position: lean forward, rock your pelvis forward and backward, stand up then sit back down.     Wait at least 30 seconds to 1 minute to see if a second urine stream begins.     Do not stop your urine stream or push/strain!      1) Bowel function - consider the following recommendations for optimizing bowel function. Good bowel health is important for overall pelvic floor function.     Adequate fluid intake is necessary for proper bowel function. Goal this week is to drink 64 ounces/day. Try completing your water bottle one time by lunch and second bottle by dinner time.     Fiber adds bulk to stool and promotes more frequent and regular bowel movements. Strategies to increase fiber intake:  One cup of high fiber cereal every day.  1/2 to 1 cup of prune juice or several stewed prunes every day, followed by a cup of hot water or tea.   2 cups of fruit or 2 1/2 cups vegetables, 6-8 oz high fiber grains daily  Fiber supplements, I.e. Metamucil, Citrucel, Konsyl, Benefiber  2 tablespoons of flax seed meal (can be added to cereal, smoothies, yogurt, oatmeal, etc)     Positioning and techniques for elimination     Bowel Movement Mechanics  1. Sit on the toilet comfortably with legs and buttocks relaxed.  2. Put your  "feet on a waste basket or squatty potty  3. Lean forward while keeping your back straight, forearms rest on knees.  4. Keep your knees apart.  5. Fully relax pelvic floor muscles - think about softening, opening, dropping  6. Use gentle belly breaths and bulge lower abdominals like making a beer belly  7. Keep belly big on exhalation  8. Exhale like blowing out birthday candles  9. Keep breathing like this through entire bowel movement  10. Make sure to give enough time, ok for it to take several minutes     Do not strain and Do not hold your breath.       (Search "Squatty Potty" on [employer])       Bowel Massage -  Try performing this massage to your abdomen every morning before getting out of bed. Try to do it for 3-5 minutes. Follow with some nice deep breaths and drink a glass of warm water afterwards.        "
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
## 2025-06-09 DIAGNOSIS — K59.09 CHRONIC CONSTIPATION: Primary | ICD-10-CM

## 2025-06-09 RX ORDER — LACTULOSE 10 G/10G
10 SOLUTION ORAL DAILY
Qty: 30 PACKET | Refills: 5 | Status: SHIPPED | OUTPATIENT
Start: 2025-06-09 | End: 2025-12-06

## 2025-06-27 ENCOUNTER — PATIENT MESSAGE (OUTPATIENT)
Dept: FAMILY MEDICINE | Facility: CLINIC | Age: 40
End: 2025-06-27
Payer: COMMERCIAL

## 2025-06-30 DIAGNOSIS — E66.811 CLASS 1 OBESITY DUE TO EXCESS CALORIES WITHOUT SERIOUS COMORBIDITY WITH BODY MASS INDEX (BMI) OF 30.0 TO 30.9 IN ADULT: Primary | ICD-10-CM

## 2025-06-30 DIAGNOSIS — E66.09 CLASS 1 OBESITY DUE TO EXCESS CALORIES WITHOUT SERIOUS COMORBIDITY WITH BODY MASS INDEX (BMI) OF 30.0 TO 30.9 IN ADULT: Primary | ICD-10-CM

## 2025-06-30 RX ORDER — TIRZEPATIDE 5 MG/.5ML
5 INJECTION, SOLUTION SUBCUTANEOUS
Qty: 2 ML | Refills: 2 | Status: SHIPPED | OUTPATIENT
Start: 2025-06-30 | End: 2025-09-28

## 2025-07-01 ENCOUNTER — OFFICE VISIT (OUTPATIENT)
Dept: FAMILY MEDICINE | Facility: CLINIC | Age: 40
End: 2025-07-01
Payer: COMMERCIAL

## 2025-07-01 ENCOUNTER — OFFICE VISIT (OUTPATIENT)
Dept: URGENT CARE | Facility: CLINIC | Age: 40
End: 2025-07-01
Payer: COMMERCIAL

## 2025-07-01 VITALS
BODY MASS INDEX: 29.45 KG/M2 | HEART RATE: 87 BPM | WEIGHT: 150 LBS | SYSTOLIC BLOOD PRESSURE: 135 MMHG | HEIGHT: 60 IN | RESPIRATION RATE: 17 BRPM | TEMPERATURE: 99 F | OXYGEN SATURATION: 98 % | DIASTOLIC BLOOD PRESSURE: 89 MMHG

## 2025-07-01 VITALS
WEIGHT: 150.38 LBS | SYSTOLIC BLOOD PRESSURE: 127 MMHG | OXYGEN SATURATION: 100 % | DIASTOLIC BLOOD PRESSURE: 95 MMHG | HEART RATE: 90 BPM | HEIGHT: 60 IN | BODY MASS INDEX: 29.52 KG/M2

## 2025-07-01 DIAGNOSIS — R09.82 PND (POST-NASAL DRIP): ICD-10-CM

## 2025-07-01 DIAGNOSIS — E66.9 OBESITY (BMI 30-39.9): Primary | ICD-10-CM

## 2025-07-01 DIAGNOSIS — J02.9 SORE THROAT: Primary | ICD-10-CM

## 2025-07-01 DIAGNOSIS — F41.9 ANXIETY: ICD-10-CM

## 2025-07-01 DIAGNOSIS — H60.501 ACUTE OTITIS EXTERNA OF RIGHT EAR, UNSPECIFIED TYPE: ICD-10-CM

## 2025-07-01 DIAGNOSIS — F32.1 MAJOR DEPRESSIVE DISORDER, SINGLE EPISODE, MODERATE: ICD-10-CM

## 2025-07-01 LAB
CTP QC/QA: YES
FLUAV AG NPH QL: NEGATIVE
FLUBV AG NPH QL: NEGATIVE
S PYO RRNA THROAT QL PROBE: NEGATIVE
SARS-COV+SARS-COV-2 AG RESP QL IA.RAPID: NEGATIVE

## 2025-07-01 PROCEDURE — 87804 INFLUENZA ASSAY W/OPTIC: CPT | Mod: QW,,,

## 2025-07-01 PROCEDURE — 3074F SYST BP LT 130 MM HG: CPT | Mod: CPTII,S$GLB,, | Performed by: FAMILY MEDICINE

## 2025-07-01 PROCEDURE — 3008F BODY MASS INDEX DOCD: CPT | Mod: CPTII,S$GLB,, | Performed by: FAMILY MEDICINE

## 2025-07-01 PROCEDURE — 99214 OFFICE O/P EST MOD 30 MIN: CPT | Mod: S$GLB,,,

## 2025-07-01 PROCEDURE — 99999 PR PBB SHADOW E&M-EST. PATIENT-LVL III: CPT | Mod: PBBFAC,,, | Performed by: FAMILY MEDICINE

## 2025-07-01 PROCEDURE — 3080F DIAST BP >= 90 MM HG: CPT | Mod: CPTII,S$GLB,, | Performed by: FAMILY MEDICINE

## 2025-07-01 PROCEDURE — 99214 OFFICE O/P EST MOD 30 MIN: CPT | Mod: S$GLB,,, | Performed by: FAMILY MEDICINE

## 2025-07-01 PROCEDURE — 87880 STREP A ASSAY W/OPTIC: CPT | Mod: QW,,,

## 2025-07-01 PROCEDURE — 87811 SARS-COV-2 COVID19 W/OPTIC: CPT | Mod: QW,S$GLB,,

## 2025-07-01 RX ORDER — VENLAFAXINE HYDROCHLORIDE 37.5 MG/1
37.5 CAPSULE, EXTENDED RELEASE ORAL DAILY
Qty: 30 CAPSULE | Refills: 11 | Status: SHIPPED | OUTPATIENT
Start: 2025-07-01 | End: 2025-07-01

## 2025-07-01 RX ORDER — TIRZEPATIDE 7.5 MG/.5ML
7.5 INJECTION, SOLUTION SUBCUTANEOUS
Qty: 2 ML | Refills: 0 | Status: SHIPPED | OUTPATIENT
Start: 2025-08-01

## 2025-07-01 RX ORDER — TIRZEPATIDE 10 MG/.5ML
10 INJECTION, SOLUTION SUBCUTANEOUS
Qty: 2 ML | Refills: 3 | Status: SHIPPED | OUTPATIENT
Start: 2025-09-01

## 2025-07-01 RX ORDER — CIPROFLOXACIN AND DEXAMETHASONE 3; 1 MG/ML; MG/ML
4 SUSPENSION/ DROPS AURICULAR (OTIC) 2 TIMES DAILY
Qty: 7.5 ML | Refills: 0 | Status: SHIPPED | OUTPATIENT
Start: 2025-07-01 | End: 2025-07-08
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
## 2025-07-01 NOTE — PROGRESS NOTES
Subjective:      Patient ID: Amara Reis is a 39 y.o. female.    Vitals:  height is 5' (1.524 m) and weight is 68 kg (150 lb). Her oral temperature is 99.1 °F (37.3 °C). Her blood pressure is 135/89 and her pulse is 87. Her respiration is 17 and oxygen saturation is 98%.     Chief Complaint: Sinus Problem    39-year-old female patient with past medical history of migraine, vertigo, hypertension, and obstructive sleep apnea presents to the clinic with complaints of right ear pain, sore throat, and mild congestion that began this morning.  Patient states her daughter began feeling warm on Saturday and also has an ear infection.  Patient denies any fever, cough, chest pain, shortness of breath, nausea, vomiting, diarrhea, abdominal pain, or body aches.  Patient reports taking Zyrtec this morning.  Patient states she has Astelin nasal spray at home but has not been using it.  Patient states she has had repeated issues with her right ear.    Sinus Problem  This is a new problem. The current episode started today. The problem has been gradually worsening since onset. There has been no fever. Her pain is at a severity of 4/10. The pain is moderate. Associated symptoms include congestion, ear pain (Right) and a sore throat. Pertinent negatives include no chills, coughing, diaphoresis, headaches, neck pain or shortness of breath. Past treatments include nothing. The treatment provided no relief.       Constitution: Negative for chills, sweating, fatigue and fever.   HENT:  Positive for ear pain (Right), ear discharge (Bloody), congestion, postnasal drip and sore throat. Negative for hearing loss.    Neck: Negative for neck pain.   Cardiovascular:  Negative for chest pain and palpitations.   Respiratory:  Negative for cough and shortness of breath.    Gastrointestinal:  Negative for abdominal pain, nausea, vomiting and diarrhea.   Musculoskeletal:  Negative for muscle ache.   Skin:  Negative for color change, pale, rash  and erythema.   Neurological:  Negative for dizziness, headaches, disorientation and altered mental status.   Psychiatric/Behavioral:  Negative for altered mental status, disorientation and confusion.       Objective:     Physical Exam   Constitutional: She is oriented to person, place, and time. She appears well-developed. She is cooperative.  Non-toxic appearance. She does not appear ill. No distress.   HENT:   Head: Normocephalic and atraumatic.   Ears:   Right Ear: Hearing, external ear and ear canal normal. There is drainage (Bloody) and tenderness. No swelling. Tympanic membrane is perforated (PE present). Tympanic membrane is not erythematous and not bulging. no impacted cerumen  Left Ear: Hearing, tympanic membrane, external ear and ear canal normal. No no drainage, swelling or tenderness. Tympanic membrane is not erythematous and not bulging. no impacted cerumen  Nose: Congestion present. No mucosal edema, rhinorrhea or nasal deformity. No epistaxis. Right sinus exhibits no maxillary sinus tenderness and no frontal sinus tenderness. Left sinus exhibits no maxillary sinus tenderness and no frontal sinus tenderness.   Mouth/Throat: Uvula is midline and mucous membranes are normal. Mucous membranes are moist. No trismus in the jaw. Normal dentition. No uvula swelling. Posterior oropharyngeal erythema present. No oropharyngeal exudate or posterior oropharyngeal edema.   Eyes: Conjunctivae and lids are normal. Pupils are equal, round, and reactive to light. Right eye exhibits no discharge. Left eye exhibits no discharge. No scleral icterus. Extraocular movement intact   Neck: Trachea normal and phonation normal. Neck supple. No edema present. No erythema present. No neck rigidity present.   Cardiovascular: Normal rate, regular rhythm, normal heart sounds and normal pulses.   No murmur heard.  Pulmonary/Chest: Effort normal and breath sounds normal. No respiratory distress. She has no decreased breath sounds. She  has no wheezes. She has no rhonchi. She has no rales.   Abdominal: Normal appearance. She exhibits no distension.   Musculoskeletal: Normal range of motion.         General: No deformity. Normal range of motion.   Neurological: She is alert and oriented to person, place, and time. She exhibits normal muscle tone. Coordination normal.   Skin: Skin is warm, dry, intact, not diaphoretic, not pale and no rash. No erythema   Psychiatric: Her speech is normal and behavior is normal. Judgment and thought content normal.   Nursing note and vitals reviewed.      Assessment:     1. Sore throat    2. PND (post-nasal drip)    3. Acute otitis externa of right ear, unspecified type        Plan:       Sore throat  -     SARS Coronavirus 2 Antigen, POCT Manual Read  -     POCT Influenza A/B Rapid Antigen  -     POCT rapid strep A    PND (post-nasal drip)  -     SARS Coronavirus 2 Antigen, POCT Manual Read  -     POCT Influenza A/B Rapid Antigen    Acute otitis externa of right ear, unspecified type    Other orders  -     ciprofloxacin-dexAMETHasone 0.3-0.1% (CIPRODEX) 0.3-0.1 % DrpS; Place 4 drops into the right ear 2 (two) times daily. for 7 days  Dispense: 7.5 mL; Refill: 0                Labs:  Influenza A and B negative.  COVID negative.  Rapid strep negative  Take medications as prescribed.  Suggest continuing at home Zyrtec and Astelin nasal spray per package instructions.  Tylenol/Motrin per package instructions for any pain or fever.  Assure adequate hydration and rest.  Throat lozenges or Chloraseptic per package instructions for sore throat.    Warm salt water gargles every 2-3 hours as needed for sore throat.    Nasal saline flushes or irrigation as directed for nasal saline congestion and sinus related symptoms.  Follow-up with PCP in 1-2 days.  Follow up with ENT if symptoms fail to improve in the next 10-14 days.  Return to clinic as needed.  To ED for any new or acutely worsening symptoms.  Patient in agreement with  plan of care.    DISCLAIMER: Please note that my documentation in this Electronic Healthcare Record was produced using speech recognition software and therefore may contain errors related to that software system.These could include grammar, punctuation and spelling errors or the inclusion/exclusion of phrases that were not intended. Garbled syntax, mangled pronouns, and other bizarre constructions may be attributed to that software system.

## 2025-07-07 ENCOUNTER — TELEPHONE (OUTPATIENT)
Dept: FAMILY MEDICINE | Facility: CLINIC | Age: 40
End: 2025-07-07
Payer: COMMERCIAL

## 2025-07-07 NOTE — TELEPHONE ENCOUNTER
----- Message from Karina sent at 7/7/2025 10:26 AM CDT -----  Patient calling in regarding to a post dated prescription for next month. But the prescription was accidentally knocked out for this month. Please reauth the prescription for patient.   [facility]   [phone]

## 2025-07-10 ENCOUNTER — CLINICAL SUPPORT (OUTPATIENT)
Dept: REHABILITATION | Facility: HOSPITAL | Age: 40
End: 2025-07-10
Payer: COMMERCIAL

## 2025-07-10 DIAGNOSIS — M62.89 PELVIC FLOOR DYSFUNCTION: Primary | ICD-10-CM

## 2025-07-10 PROCEDURE — 97112 NEUROMUSCULAR REEDUCATION: CPT | Mod: PO

## 2025-07-10 PROCEDURE — 97140 MANUAL THERAPY 1/> REGIONS: CPT | Mod: PO

## 2025-07-10 NOTE — PROGRESS NOTES
Outpatient Rehab    Physical Therapy Progress Note    Patient Name: Amara Reis  MRN: [id number]  YOB: 1985  Encounter Date: 7/10/2025    Therapy Diagnosis:   Encounter Diagnosis   Name Primary?    Pelvic floor dysfunction Yes     Physician: MAYNOR Barboza,*    Physician Orders: Eval and Treat  Medical Diagnosis: JOEL (stress urinary incontinence, female)  Urinary frequency  Surgical Diagnosis: Not applicable for this Episode   Surgical Date: Not applicable for this Episode  Days Since Last Surgery: Not applicable for this Episode    Visit # / Visits Authorized:  1 / 10  Insurance Authorization Period: 6/4/2025 to 12/31/2025  Date of Evaluation: 6/6/2025  Plan of Care Certification: 6/9/2025 to 8/29/2025      PT/PTA: PT   Number of PTA visits since last PT visit:0  Time In: 1405   Time Out: 1500  Total Time (in minutes): 55   Total Billable Time (in minutes): 55    FOTO:perform 2/3 on 7/17/25    Precautions:     standard      Subjective   She is going to start taking Effexor on the 23rd and got a referral to psychiatry but has not recieved a call back. She was prescribed a laxative (Kristalose) and this has helped her to go more. She has been drinking more water..         Objective   Pelvic External Observations  Consent for Examination: Yes, Chaperone Present: Declines chaperone    Pelvic Assessment  Perineal Skin Quality: Unremarkable  Pelvic Scarring: Yes  Pelvic Scarring Details: Perineal scarring palpable at midline  Perineal Body Resting Position: Normal  Perineal Body Length: Less than or equal to 3 centimeters  Perineal Descent Bearing Down: Substitution observed  Volitional Contraction: Present  Volitional Relaxation: Incomplete  Volitional Bearing Down: Substitution observed          Pelvic Floor Palpation  Pelvic Floor Exams Performed: External Pelvic and Internal Vaginal                                           Pelvic Floor Special Tests  Single Digit Intravaginal  Assessment  Intravaginal Pelvic Floor Strength: 3  Intravaginal Pelvic Floor Endurance (sec): 10  Intravaginal Pelvic Floor Repetitions: 3  Intravaginal Pelvic Floor Co-Contraction Substitution: Present  Intravaginal Pelvic Floor Relaxation Response: Incomplete, Delayed  Additional Intravaginal Assessment Details: Right periurethral musculature weaker than left. Significant dyssynergic defecation present with inability to lengthen pelvic floor. Patient contracts with inhalation             Treatment:  Manual Therapy  MT 1: Myofascial release of bilateral pelvic floor, cues for pelvic lengthening, especially with bearing down  MT 2: Diaphragm release  MT 3: Colon massage and cupping along colon and rectus abdominis  Balance/Neuromuscular Re-Education  NMR 1: Pelvic coordination assessment as noted above  NMR 2: pelvic down training techniques with diaphragmatic breathing  NMR 3: Bearing down review and mechanics  NMR 4: Eccentic abdominal wall lengthening with diaphragmatic breathing    Time Entry(in minutes):  Manual Therapy Time Entry: 25  Neuromuscular Re-Education Time Entry: 30    Assessment & Plan   Assessment: Amara presents today with elevated baseline pelvic tone, perineal scar tissue palpable and restricting bowel emptying, and significant dyssynergic defecation. Patient will benefit from further down training, colon massage to allow for increased intestinal motility with decreased abdominal discomfort, and biofeedback for improved down training proprioception. Continue to progress skilled pelvic floor physical therapy services as appropriate to address deficits listed above.   Evaluation/Treatment Tolerance: Patient tolerated treatment well    The patient will continue to benefit from skilled outpatient physical therapy in order to address the deficits listed in the problem list on the initial evaluation, provide patient and family education, and maximize the patients level of independence in the home  and community environments.     The patient's spiritual, cultural, and educational needs were considered, and the patient is agreeable to the plan of care and goals.           Plan: Continue current POC focusing on coordination and down training as noted above.    Goals:   Active       LTG       - Pt will demonstrate excellent knowledge and adherence to HEP for continued self-maintenance of symptoms.  (Not Progressing)       Start:  06/09/25    Expected End:  08/29/25            - Pt will report FOTO score of 60% or better, indicating clinically relevant increase in function.  (Not Progressing)       Start:  06/09/25    Expected End:  08/29/25            - Pt will report ability to delay urinary urge for at least 15 minutes to maintain continence with ADL/IADLs.   (Not Progressing)       Start:  06/09/25    Expected End:  08/29/25            - Pt will report bearing down appropriately 100% of the time for improved bowel function and decreased stress on adjacent pelvic structures.         Start:  06/09/25    Expected End:  08/29/25            - Pt will report >/=70% improved incidence of urinary incontinence for improved hygiene and ADL/IADL tolerance.   (Not Progressing)       Start:  06/09/25    Expected End:  08/29/25               STG       - Pt will demonstrate excellent knowledge and adherence to HEP to facilitate optimal recovery.  (Not Progressing)       Start:  06/09/25    Expected End:  08/29/25            - Pt will demonstrate proper PFM contraction, relaxation, and lengthening coordinated with TA and breath for improved muscle coordination needed for functional activity. (Not Progressing)       Start:  06/09/25    Expected End:  08/29/25                Rhea Ward, PT

## 2025-07-12 ENCOUNTER — PATIENT MESSAGE (OUTPATIENT)
Dept: OBSTETRICS AND GYNECOLOGY | Facility: CLINIC | Age: 40
End: 2025-07-12
Payer: COMMERCIAL

## 2025-07-14 ENCOUNTER — TELEPHONE (OUTPATIENT)
Dept: OBSTETRICS AND GYNECOLOGY | Facility: CLINIC | Age: 40
End: 2025-07-14
Payer: COMMERCIAL

## 2025-07-14 DIAGNOSIS — N93.9 ABNORMAL UTERINE BLEEDING (AUB): Primary | ICD-10-CM

## 2025-07-14 RX ORDER — TRANEXAMIC ACID 650 MG/1
1300 TABLET ORAL 3 TIMES DAILY
Qty: 540 TABLET | Refills: 3 | Status: SHIPPED | OUTPATIENT
Start: 2025-07-14 | End: 2026-07-14

## 2025-07-24 DIAGNOSIS — Z12.31 OTHER SCREENING MAMMOGRAM: ICD-10-CM

## 2025-07-29 ENCOUNTER — PATIENT MESSAGE (OUTPATIENT)
Dept: ADMINISTRATIVE | Facility: HOSPITAL | Age: 40
End: 2025-07-29
Payer: COMMERCIAL

## 2025-07-29 ENCOUNTER — HOSPITAL ENCOUNTER (OUTPATIENT)
Dept: RADIOLOGY | Facility: HOSPITAL | Age: 40
Discharge: HOME | End: 2025-07-29
Attending: FAMILY MEDICINE
Payer: COMMERCIAL

## 2025-07-29 DIAGNOSIS — Z12.31 OTHER SCREENING MAMMOGRAM: ICD-10-CM

## 2025-07-29 PROCEDURE — 77063 BREAST TOMOSYNTHESIS BI: CPT | Mod: 26,,, | Performed by: RADIOLOGY

## 2025-07-29 PROCEDURE — 77067 SCR MAMMO BI INCL CAD: CPT | Mod: TC,PO

## 2025-07-29 PROCEDURE — 77067 SCR MAMMO BI INCL CAD: CPT | Mod: 26,,, | Performed by: RADIOLOGY

## 2025-07-31 ENCOUNTER — OFFICE VISIT (OUTPATIENT)
Dept: UROGYNECOLOGY | Facility: CLINIC | Age: 40
End: 2025-07-31
Payer: COMMERCIAL

## 2025-07-31 ENCOUNTER — CLINICAL SUPPORT (OUTPATIENT)
Dept: REHABILITATION | Facility: HOSPITAL | Age: 40
End: 2025-07-31
Payer: COMMERCIAL

## 2025-07-31 DIAGNOSIS — N39.3 SUI (STRESS URINARY INCONTINENCE, FEMALE): Primary | ICD-10-CM

## 2025-07-31 DIAGNOSIS — R35.0 URINARY FREQUENCY: ICD-10-CM

## 2025-07-31 DIAGNOSIS — R31.21 ASYMPTOMATIC MICROSCOPIC HEMATURIA: ICD-10-CM

## 2025-07-31 DIAGNOSIS — M62.89 PELVIC FLOOR DYSFUNCTION: Primary | ICD-10-CM

## 2025-07-31 LAB
BILIRUBIN, UA POC OHS: ABNORMAL
BLOOD, UA POC OHS: ABNORMAL
CLARITY, UA POC OHS: CLEAR
COLOR, UA POC OHS: ABNORMAL
GLUCOSE, UA POC OHS: NEGATIVE
KETONES, UA POC OHS: NEGATIVE
LEUKOCYTES, UA POC OHS: NEGATIVE
NITRITE, UA POC OHS: NEGATIVE
PH, UA POC OHS: 6
PROTEIN, UA POC OHS: 30
SPECIFIC GRAVITY, UA POC OHS: >=1.03
UROBILINOGEN, UA POC OHS: 1

## 2025-07-31 PROCEDURE — 99999 PR PBB SHADOW E&M-EST. PATIENT-LVL III: CPT | Mod: PBBFAC,,, | Performed by: NURSE PRACTITIONER

## 2025-07-31 PROCEDURE — 97140 MANUAL THERAPY 1/> REGIONS: CPT | Mod: PO

## 2025-07-31 PROCEDURE — 97112 NEUROMUSCULAR REEDUCATION: CPT | Mod: PO

## 2025-07-31 PROCEDURE — 87086 URINE CULTURE/COLONY COUNT: CPT | Performed by: NURSE PRACTITIONER

## 2025-07-31 PROCEDURE — 81001 URINALYSIS AUTO W/SCOPE: CPT | Performed by: NURSE PRACTITIONER

## 2025-07-31 NOTE — PROGRESS NOTES
"  Outpatient Rehab    Physical Therapy Visit    Patient Name: Amara Reis  MRN: [id number]  YOB: 1985  Encounter Date: 7/31/2025    Therapy Diagnosis:   Encounter Diagnosis   Name Primary?    Pelvic floor dysfunction Yes     Physician: MAYNOR Barboza,*    Physician Orders: Eval and Treat  Medical Diagnosis: JOEL (stress urinary incontinence, female)  Urinary frequency  Surgical Diagnosis: Not applicable for this Episode   Surgical Date: Not applicable for this Episode  Days Since Last Surgery: Not applicable for this Episode    Visit # / Visits Authorized:  2 / 10  Insurance Authorization Period: 6/4/2025 to 12/31/2025  Date of Evaluation: 6/6/2025  Plan of Care Certification: 6/9/2025 to 8/29/2025      PT/PTA: PT   Number of PTA visits since last PT visit:0  Time In: 1304   Time Out: 1400  Total Time (in minutes): 56   Total Billable Time (in minutes): 56    FOTO:  Intake Score (%): 49  Survey Score 2 (%): Not applicable for this Episode  Survey Score 3 (%): Not applicable for this Episode    Precautions:  Additional Precautions and Protocol Details: standard      Subjective   follows up about abnormal mammogram in 1-2 weeks. She is supposed to have surgery for her ear tomorrow. She has only leaked once since her last visit. She has not been able to focus on relaxation because of the stress she has been under. Bowel movements have been better..         Objective            Treatment:  Manual Therapy  MT 2: Diaphragm release  MT 3: Colon massage and cupping along colon and rectus abdominis  Balance/Neuromuscular Re-Education  NMR 2: pelvic down training techniques with diaphragmatic breathing  NMR 4: Child's pose + diaphragmatic breathing 2 x 10 breaths  NMR 5: Aurora pose + diaphragmatic breathing 2 x 10 breaths  NMR 6: Cat/cows + diaphragmatic breathing 2 x 10 breaths  NMR 7: Cobra pose + diaphragmatic breathing 15 x 5"  NMR 8: Split stance + diaphragmatic breathing 5 x 10" B  NMR 9: Hip " "bridge, blue theraband + TrA, full pelvic relaxation between reps 2 x 10, 5"  NMR 10: SL clams 15 x 5" blue theraband    Time Entry(in minutes):  Manual Therapy Time Entry: 10  Neuromuscular Re-Education Time Entry: 46    Assessment & Plan   Assessment: Amara tolerated all down training tasks very well today without restriction or reported elevation in pain. She additionally tolerated moderate introduction to deep core and glute activation tasks that will allow for offloading of the pelvic floor, reducing tension. Frequent cues to ensure complete pelvic relaxation between reps and to avoid breath holding.   Evaluation/Treatment Tolerance: Patient tolerated treatment well    The patient will continue to benefit from skilled outpatient physical therapy in order to address the deficits listed in the problem list on the initial evaluation, provide patient and family education, and maximize the patients level of independence in the home and community environments.     The patient's spiritual, cultural, and educational needs were considered, and the patient is agreeable to the plan of care and goals.           Plan: Continue current POC focusing on coordination and down training as noted above.    Goals:   Active       LTG       - Pt will demonstrate excellent knowledge and adherence to HEP for continued self-maintenance of symptoms.  (Not Progressing)       Start:  06/09/25    Expected End:  08/29/25            - Pt will report FOTO score of 60% or better, indicating clinically relevant increase in function.  (Not Progressing)       Start:  06/09/25    Expected End:  08/29/25            - Pt will report ability to delay urinary urge for at least 15 minutes to maintain continence with ADL/IADLs.   (Not Progressing)       Start:  06/09/25    Expected End:  08/29/25            - Pt will report bearing down appropriately 100% of the time for improved bowel function and decreased stress on adjacent pelvic structures.    "      Start:  06/09/25    Expected End:  08/29/25            - Pt will report >/=70% improved incidence of urinary incontinence for improved hygiene and ADL/IADL tolerance.   (Not Progressing)       Start:  06/09/25    Expected End:  08/29/25               STG       - Pt will demonstrate excellent knowledge and adherence to HEP to facilitate optimal recovery.  (Not Progressing)       Start:  06/09/25    Expected End:  08/29/25            - Pt will demonstrate proper PFM contraction, relaxation, and lengthening coordinated with TA and breath for improved muscle coordination needed for functional activity. (Not Progressing)       Start:  06/09/25    Expected End:  08/29/25                Rhea Ward, PT

## 2025-07-31 NOTE — PROGRESS NOTES
Subjective:       Patient ID: Amara Reis is a 40 y.o. female.    Chief Complaint: Follow-up      Amara Reis is a 40 y.o. female.  Who presents today for follow-up in regards to her urinary concerns.  She was last seen in our office on 04/22/2025.  At that particular time she was having issues with urinary frequency and JOEL.  The patient is still considering having another child so it was decided that pelvic floor physical therapy would probably be the best initial step.  The referral was placed but patient was not able to start PT until just a few weeks ago.  She has had three sessions of PT. she is unsure if it has been all that helpful because in the past month she has had an enormous amount of stress occur in her personal life.  She is having issues remembering and focusing on everything because of what all is going on.  She thinks it might be helping some but is not 100% sure.  She is amenable to continue with the therapy at this point.  The patient is questioning the bilirubin blood and protein in her urine.  She states that she has had kidney issues in the past.  She states that she has seen Nephrology in the past but does not remember the provider's name.  NP reviewed that we will run some preliminary test on her urine today but can consider doing Nephrology referral in the future.  Patient verbalized understanding.  She denies any other acute complaints/concerns at this time.    Review of Systems   Constitutional:  Negative for activity change, fever and unexpected weight change.   HENT:  Negative for hearing loss.    Eyes:  Negative for visual disturbance.   Respiratory:  Negative for shortness of breath and wheezing.    Cardiovascular:  Negative for chest pain, palpitations and leg swelling.   Gastrointestinal:  Negative for abdominal pain, constipation and diarrhea.   Genitourinary:  Positive for frequency. Negative for dyspareunia, dysuria, urgency, vaginal bleeding and vaginal discharge.    Musculoskeletal:  Negative for gait problem and neck pain.   Skin:  Negative for rash and wound.   Allergic/Immunologic: Negative for immunocompromised state.   Neurological:  Negative for tremors, speech difficulty and weakness.   Hematological:  Does not bruise/bleed easily.   Psychiatric/Behavioral:  Positive for decreased concentration. Negative for agitation and confusion.        Objective:      Physical Exam  Vitals reviewed. Exam conducted with a chaperone present.   Constitutional:       General: She is not in acute distress.     Appearance: She is well-developed.   HENT:      Head: Normocephalic and atraumatic.   Neck:      Thyroid: No thyromegaly.   Pulmonary:      Effort: Pulmonary effort is normal. No respiratory distress.   Abdominal:      Palpations: Abdomen is soft.      Tenderness: There is no abdominal tenderness.      Hernia: No hernia is present.   Musculoskeletal:         General: Normal range of motion.      Cervical back: Normal range of motion.   Skin:     General: Skin is warm and dry.      Findings: No rash.   Neurological:      Mental Status: She is alert and oriented to person, place, and time.   Psychiatric:         Mood and Affect: Mood is anxious.         Behavior: Behavior normal.         Thought Content: Thought content normal.       Pelvic Exam:  Deferred at this time      Assessment:       1. JOEL (stress urinary incontinence, female)    2. Asymptomatic microscopic hematuria    3. Urinary frequency        Plan:       JOEL (stress urinary incontinence, female) continue with pelvic floor PT  -     POCT Urinalysis(Instrument)    Asymptomatic microscopic hematuria labs as noted below  -     Urinalysis Microscopic  -     Cytology, Urine  -     Urine Culture High Risk ($$)    Urinary frequency continue with pelvic floor PT      RTC 4 months          This note was primarily composed with dictation software.  Grammatical errors may exist.

## 2025-08-01 LAB
AMORPH CRY UR QL COMP ASSIST: ABNORMAL
BACTERIA #/AREA URNS AUTO: ABNORMAL /HPF
MICROSCOPIC COMMENT: ABNORMAL
SQUAMOUS #/AREA URNS AUTO: 6 /HPF
WBC #/AREA URNS AUTO: 9 /HPF (ref 0–5)

## 2025-08-02 LAB — BACTERIA UR CULT: NORMAL

## 2025-08-04 ENCOUNTER — OFFICE VISIT (OUTPATIENT)
Dept: FAMILY MEDICINE | Facility: CLINIC | Age: 40
End: 2025-08-04
Payer: COMMERCIAL

## 2025-08-04 VITALS
OXYGEN SATURATION: 98 % | SYSTOLIC BLOOD PRESSURE: 122 MMHG | HEIGHT: 60 IN | HEART RATE: 94 BPM | BODY MASS INDEX: 28.35 KG/M2 | DIASTOLIC BLOOD PRESSURE: 80 MMHG | WEIGHT: 144.38 LBS

## 2025-08-04 DIAGNOSIS — Z71.3 WEIGHT LOSS COUNSELING, ENCOUNTER FOR: Primary | ICD-10-CM

## 2025-08-04 DIAGNOSIS — E66.3 OVERWEIGHT (BMI 25.0-29.9): ICD-10-CM

## 2025-08-04 PROCEDURE — 3008F BODY MASS INDEX DOCD: CPT | Mod: CPTII,S$GLB,, | Performed by: FAMILY MEDICINE

## 2025-08-04 PROCEDURE — 99999 PR PBB SHADOW E&M-EST. PATIENT-LVL III: CPT | Mod: PBBFAC,,, | Performed by: FAMILY MEDICINE

## 2025-08-04 PROCEDURE — 3079F DIAST BP 80-89 MM HG: CPT | Mod: CPTII,S$GLB,, | Performed by: FAMILY MEDICINE

## 2025-08-04 PROCEDURE — 1159F MED LIST DOCD IN RCRD: CPT | Mod: CPTII,S$GLB,, | Performed by: FAMILY MEDICINE

## 2025-08-04 PROCEDURE — 99213 OFFICE O/P EST LOW 20 MIN: CPT | Mod: S$GLB,,, | Performed by: FAMILY MEDICINE

## 2025-08-04 PROCEDURE — 3074F SYST BP LT 130 MM HG: CPT | Mod: CPTII,S$GLB,, | Performed by: FAMILY MEDICINE

## 2025-08-04 RX ORDER — VENLAFAXINE HYDROCHLORIDE 37.5 MG/1
37.5 CAPSULE, EXTENDED RELEASE ORAL
COMMUNITY
Start: 2025-08-01

## 2025-08-05 NOTE — PROGRESS NOTES
"  SUBJECTIVE:    Patient ID: Amara Reis is a 40 y.o. female.    Chief Complaint: Weight Loss  38 yo female here today to follow-up on her medical weight loss management .  Pt became pregnant and had to stop the Wegovy, she has subsequently had a miscarrage at 4 month, she would like to restart the Zepbound she has been currently on 5mg. Past medical history is notable for PCOS.  Patient has been on GLP-1 in the past without any unwanted side effects.        High school weight:  85  Ideal Body weight 100  Goal Weight 120     06/23/37138  08/02/24          151  11/05/24          142  02/04/25          159  07/01/25 150  08/04/25 144     History of uncontrolled hypertension? no  History of cardiovascular disease? no  History of glaucoma? no  History of eating disorder? As a child, used to starve self "to stay skinny" under her mother's guidance. Was 85 lb at first pregnancy  History of substance use? none  History of cholelithiasis? no  History of pancreatitis? no  Personal or family history of Medullary Thyroid Cancer or MEN2? No  HPI      Past Medical History:   Diagnosis Date    Anemia     Clostridium difficile colitis     hx--reoccurs with antibiotic use    Hypertrophy of nasal turbinates 02/18/2019     Social History[1]  Past Surgical History:   Procedure Laterality Date    DILATION AND CURETTAGE OF UTERUS      EXCISION OF LESION OF NOSE Bilateral 02/18/2019    Procedure: EXCISION, LESION, NOSE;  Surgeon: Chadd Ward MD;  Location: [facility] OR;  Service: ENT;  Laterality: Bilateral;    LAPAROSCOPY      Laparoscopy x 12 for ovarian cysts, endometriosis, laser vaporization of uterosacral nerve complexes (7/26/2013)    LAPAROTOMY      laparotomy for possible myomectomy    MYRINGOTOMY WITH INSERTION OF VENTILATION TUBE Right 02/18/2019    Procedure: MYRINGOTOMY, WITH TYMPANOSTOMY TUBE INSERTION;  Surgeon: Chadd Ward MD;  Location: [facility] OR;  Service: ENT;  Laterality: Right;     Family History   Problem " Relation Name Age of Onset    Hypertension Mother Candy mccauley     Miscarriages / Stillbirths Mother Candy mccauley     Fibromyalgia Mother Candy mccauley     Hypertension Father Philippe mccauley     Thyroid disease Brother      Breast cancer Neg Hx      Colon cancer Neg Hx       Current Medications[2]  Review of patient's allergies indicates:   Allergen Reactions    Morphine Hives, Itching and Nausea And Vomiting    Flagyl [metronidazole hcl] Other (See Comments)     Oral form only--burns her   stomach. Take IV only.  Had to have surgery to fix this       Review of Systems   Constitutional:  Negative for activity change, diaphoresis, fatigue and unexpected weight change.   HENT:  Negative for congestion, hearing loss, postnasal drip, rhinorrhea, sinus pain and trouble swallowing.    Eyes:  Negative for discharge and visual disturbance.   Respiratory:  Negative for cough, chest tightness, shortness of breath and wheezing.    Cardiovascular:  Negative for chest pain and palpitations.   Gastrointestinal:  Negative for blood in stool, constipation, diarrhea and vomiting.   Endocrine: Negative for polydipsia and polyuria.   Genitourinary:  Negative for difficulty urinating, dysuria, hematuria and menstrual problem.   Musculoskeletal:  Negative for arthralgias, joint swelling and neck pain.   Neurological:  Negative for weakness and headaches.   Psychiatric/Behavioral:  Negative for agitation, behavioral problems, confusion, decreased concentration, dysphoric mood, hallucinations, self-injury, sleep disturbance and suicidal ideas. The patient is not nervous/anxious and is not hyperactive.           Blood pressure 122/80, pulse 94, height 5' (1.524 m), weight 65.5 kg (144 lb 6.4 oz), SpO2 98%. Body mass index is 28.2 kg/m².   Objective:      Physical Exam  Vitals reviewed.   Constitutional:       General: She is not in acute distress.     Appearance: She is well-developed.   HENT:      Head: Normocephalic and atraumatic.       Right Ear: External ear normal.      Left Ear: External ear normal.   Eyes:      General:         Right eye: No discharge.         Left eye: No discharge.      Conjunctiva/sclera: Conjunctivae normal.      Pupils: Pupils are equal, round, and reactive to light.   Cardiovascular:      Rate and Rhythm: Normal rate and regular rhythm.      Heart sounds: Normal heart sounds. No murmur heard.  Pulmonary:      Effort: Pulmonary effort is normal. No respiratory distress.      Breath sounds: Normal breath sounds.   Skin:     General: Skin is warm and dry.   Neurological:      Mental Status: She is oriented to person, place, and time. Mental status is at baseline.   Psychiatric:         Attention and Perception: Attention normal.         Mood and Affect: Affect is labile and angry.         Speech: Speech normal.         Behavior: Behavior is cooperative.         Thought Content: Thought content normal.         Cognition and Memory: Cognition and memory normal.         Judgment: Judgment normal.         Assessment:       1. Weight loss counseling, encounter for    2. Overweight (BMI 25.0-29.9)         Plan:           Weight loss counseling, encounter for  Patient has lost weight over the last month will have her follow-up in 3 months by then she should be on a 10 mg of tirzepatide    Overweight (BMI 25.0-29.9)                               [1]   Social History  Socioeconomic History    Marital status:    Tobacco Use    Smoking status: Never     Passive exposure: Never    Smokeless tobacco: Never   Substance and Sexual Activity    Alcohol use: No     Comment: rarely    Drug use: Never    Sexual activity: Yes     Partners: Male     Social Drivers of Health     Financial Resource Strain: High Risk (3/25/2025)    Overall Financial Resource Strain (CARDIA)     Difficulty of Paying Living Expenses: Hard   Food Insecurity: Food Insecurity Present (3/25/2025)    Hunger Vital Sign     Worried About Running Out of Food in the  Last Year: Often true     Ran Out of Food in the Last Year: Sometimes true   Transportation Needs: Unmet Transportation Needs (3/25/2025)    PRAPARE - Transportation     Lack of Transportation (Medical): Yes     Lack of Transportation (Non-Medical): Yes   Physical Activity: Insufficiently Active (6/6/2025)    Exercise Vital Sign     Days of Exercise per Week: 3 days     Minutes of Exercise per Session: 30 min   Stress: Stress Concern Present (6/6/2025)    [employer] - Occupational Stress Questionnaire     Feeling of Stress : Very much   Housing Stability: High Risk (3/25/2025)    Housing Stability Vital Sign     Unable to Pay for Housing in the Last Year: Yes     Number of Times Moved in the Last Year: 0     Homeless in the Last Year: No   [2]   Current Outpatient Medications   Medication Sig Dispense Refill    cetirizine (ZYRTEC) 10 MG tablet Take 1 tablet (10 mg total) by mouth once daily. 30 tablet 11    ergocalciferol (VITAMIN D2) 50,000 unit Cap Take 1 capsule (50,000 Units total) by mouth every 7 days. 4 capsule 5    lactulose (KRISTALOSE) 10 gram packet Take 1 packet (10 g total) by mouth once daily. 30 packet 5    PNV-DHA 27 mg iron-1 mg -300 mg Cap TAKE one CAPSULE BY MOUTH ONCE DAILY 30 capsule 5    [START ON 9/1/2025] tirzepatide, weight loss, (ZEPBOUND) 10 mg/0.5 mL PnIj Inject 10 mg into the skin every 7 days. 2 mL 3    tirzepatide, weight loss, (ZEPBOUND) 7.5 mg/0.5 mL PnIj Inject 7.5 mg into the skin every 7 days. 2 mL 0    tranexamic acid (LYSTEDA) 650 mg tablet Take 2 tablets (1,300 mg total) by mouth 3 (three) times daily. Take for up to 5 days during your period (you will use up to 30 tablets per menstrual cycle). 540 tablet 3    venlafaxine (EFFEXOR-XR) 37.5 MG 24 hr capsule Take 37.5 mg by mouth.       No current facility-administered medications for this visit.

## 2025-08-07 ENCOUNTER — OFFICE VISIT (OUTPATIENT)
Dept: FAMILY MEDICINE | Facility: CLINIC | Age: 40
End: 2025-08-07
Payer: COMMERCIAL

## 2025-08-07 VITALS
BODY MASS INDEX: 28.74 KG/M2 | HEIGHT: 60 IN | TEMPERATURE: 99 F | SYSTOLIC BLOOD PRESSURE: 115 MMHG | OXYGEN SATURATION: 99 % | DIASTOLIC BLOOD PRESSURE: 78 MMHG | HEART RATE: 82 BPM | WEIGHT: 146.38 LBS

## 2025-08-07 DIAGNOSIS — R80.9 PROTEINURIA, UNSPECIFIED TYPE: Primary | ICD-10-CM

## 2025-08-07 PROCEDURE — 99213 OFFICE O/P EST LOW 20 MIN: CPT | Mod: S$GLB,,, | Performed by: NURSE PRACTITIONER

## 2025-08-07 PROCEDURE — 3078F DIAST BP <80 MM HG: CPT | Mod: CPTII,S$GLB,, | Performed by: NURSE PRACTITIONER

## 2025-08-07 PROCEDURE — 1159F MED LIST DOCD IN RCRD: CPT | Mod: CPTII,S$GLB,, | Performed by: NURSE PRACTITIONER

## 2025-08-07 PROCEDURE — 3008F BODY MASS INDEX DOCD: CPT | Mod: CPTII,S$GLB,, | Performed by: NURSE PRACTITIONER

## 2025-08-07 PROCEDURE — 99999 PR PBB SHADOW E&M-EST. PATIENT-LVL III: CPT | Mod: PBBFAC,,, | Performed by: NURSE PRACTITIONER

## 2025-08-07 PROCEDURE — 3074F SYST BP LT 130 MM HG: CPT | Mod: CPTII,S$GLB,, | Performed by: NURSE PRACTITIONER

## 2025-08-08 NOTE — PROGRESS NOTES
Patient ID: Amara Reis is a 40 y.o. female.    Chief Complaint: Follow-up (F/u on urine )    History of Present Illness    CHIEF COMPLAINT:  Ms. Reis presents with concerns about hematuria, elevated protein levels, and weight gain following a miscarriage.    HPI:  Ms. Reis reports hematuria for several months, describing urine as dark brown or shilo in color. She mentions consistently elevated protein levels for months in previous urine tests. She had a miscarriage in January and subsequently gained 30 lbs in 4 months. She is currently down to 140-something lbs, having lost most of the weight gained.    Ms. Reis reports urinary incontinence symptoms, with urinary leakage when sneezing or coughing. She has been evaluated by a gynecologist and has started pelvic therapy, which she feels is effective. The therapy includes massage techniques.    She mentions a history of kidney problems, though the specifics are unclear. She recalls being hospitalized for a condition where her kidneys swelled up, but could not remember the exact term.    She is scheduled for surgery tomorrow to correct a tube issue. She underwent pre-operative testing 2-3 days ago.    During a recent evaluation by Dr. Horan, a lump was found on the right side during a mammogram. She was scheduled for a follow-up mammogram, which has been rescheduled for next week due to her upcoming surgery.    MEDICAL HISTORY:  Ms. Reis has a history of miscarriage in January. She experienced a urethra injury after giving birth to Mya. She also developed urinary incontinence after pregnancy. Her medical history includes past kidney problems and hydronephrosis. Ms. Reis is currently on her menstrual cycle. She denies the possibility of pregnancy. Her obstetric history includes two pregnancies (G2), one live birth (P1), and one spontaneous  (SAB1) in January.    SURGICAL HISTORY:  Ms. Reis is scheduled for surgery tomorrow to correct  a tube issue.    TEST RESULTS:  Ms. Reis's urinalysis in January showed consistently high protein levels and hematuria. A recent urinalysis revealed trace protein and slightly elevated bilirubin. A CMP conducted in January indicated normal kidney function. A recent clean catch urine culture came back negative. She recently underwent urine cytology, which was negative for high-grade urothelial carcinoma.    IMAGING:  A recent mammogram revealed a lump on the right side.      ROS:  General: -fever, -chills, -fatigue, -weight gain, -weight loss  Eyes: -vision changes, -redness, -discharge  ENT: -ear pain, -nasal congestion, -sore throat  Cardiovascular: -chest pain, -palpitations, -lower extremity edema  Respiratory: -cough, -shortness of breath  Gastrointestinal: -abdominal pain, -nausea, -vomiting, -diarrhea, -constipation, -blood in stool  Genitourinary: -dysuria, -hematuria, -frequency, +urinary incontinence, +abnormal urine appearance  Musculoskeletal: -joint pain, -muscle pain  Skin: -rash, -lesion  Neurological: -headache, -dizziness, -numbness, -tingling  Psychiatric: -anxiety, -depression, -sleep difficulty  Female Genitourinary: +pelvic pain  Breasts: +breast lumps             Past Medical History:   Diagnosis Date    Anemia     Clostridium difficile colitis     hx--reoccurs with antibiotic use    Hypertrophy of nasal turbinates 02/18/2019     Past Surgical History:   Procedure Laterality Date    DILATION AND CURETTAGE OF UTERUS      EXCISION OF LESION OF NOSE Bilateral 02/18/2019    Procedure: EXCISION, LESION, NOSE;  Surgeon: Chadd Ward MD;  Location: [facility];  Service: ENT;  Laterality: Bilateral;    LAPAROSCOPY      Laparoscopy x 12 for ovarian cysts, endometriosis, laser vaporization of uterosacral nerve complexes (7/26/2013)    LAPAROTOMY      laparotomy for possible myomectomy    MYRINGOTOMY WITH INSERTION OF VENTILATION TUBE Right 02/18/2019    Procedure: MYRINGOTOMY, WITH TYMPANOSTOMY TUBE  INSERTION;  Surgeon: Chadd Ward MD;  Location: [facility];  Service: ENT;  Laterality: Right;         Tobacco History:  reports that she has never smoked. She has never been exposed to tobacco smoke. She has never used smokeless tobacco.      Review of patient's allergies indicates:   Allergen Reactions    Morphine Hives, Itching and Nausea And Vomiting    Flagyl [metronidazole hcl] Other (See Comments)     Oral form only--burns her   stomach. Take IV only.  Had to have surgery to fix this     Current Medications[1]           Objective:      Vitals:    08/07/25 1103   BP: 115/78   Pulse: 82   Temp: 98.8 °F (37.1 °C)   TempSrc: Oral   SpO2: 99%   Weight: 66.4 kg (146 lb 6.2 oz)   Height: 5' (1.524 m)     Physical Exam  Vitals reviewed.   Constitutional:       General: She is not in acute distress.     Appearance: She is well-developed.   HENT:      Head: Normocephalic and atraumatic.      Right Ear: External ear normal.      Left Ear: External ear normal.   Eyes:      General:         Right eye: No discharge.         Left eye: No discharge.      Conjunctiva/sclera: Conjunctivae normal.      Pupils: Pupils are equal, round, and reactive to light.   Cardiovascular:      Rate and Rhythm: Normal rate and regular rhythm.      Heart sounds: Normal heart sounds. No murmur heard.  Pulmonary:      Effort: Pulmonary effort is normal. No respiratory distress.      Breath sounds: Normal breath sounds.   Abdominal:      Palpations: Abdomen is soft.   Skin:     General: Skin is warm and dry.   Neurological:      Mental Status: She is oriented to person, place, and time. Mental status is at baseline.           Assessment:       1. Proteinuria, unspecified type           Plan:       Assessment & Plan    R31.9 Hematuria, unspecified  R80.9 Proteinuria, unspecified  N39.3 Stress incontinence (female) (male)  R92.8 Other abnormal and inconclusive findings on diagnostic imaging of breast  O03.9 Complete or unspecified spontaneous   without complication  S37.39XS Other injury of urethra, sequela  Z87.448 Personal history of other diseases of urinary system    HEMATURIA:  - Ms. Reis reports blood in urine for months, with urine appearing dark brown and shilo in color.  - Pre-operative urine test confirmed hematuria.  - Previous urine cytology was negative for high-grade urothelial carcinoma.  - Ordered urine culture to rule out infection.  - Ordered 24-hour urine protein test to investigate persistent elevated protein levels and blood in urine.  - Ordered CMP to assess renal function and liver enzymes.  - Instructed patient to bring the completed 24-hour urine collection to the lab at the hospital immediately after collection period ends.    PROTEINURIA:  - Previous tests show trace protein in urine, which should be negative.  - This finding, along with the hematuria, warrants further investigation.  - The 24-hour urine protein test ordered will quantify protein excretion and help determine if there is underlying kidney pathology.    STRESS INCONTINENCE:  - Ms. Reis reports urinary incontinence when sneezing or coughing.  - Referred to pelvic therapy for management of stress incontinence.    ABNORMAL MAMMOGRAM FINDINGS:  - Recent mammogram revealed a lump on the right side.  - Mammogram was scheduled to be redone but was rescheduled due to surgery.  - Will follow up on repeat mammogram after surgery.    SPONTANEOUS :  - Ms. Reis had a miscarriage in January.    URETHRA INJURY:  - Ms. Reis reports splitting of urethra after childbirth.  - Documented upcoming surgery to correct this urological issue.    HISTORY OF KIDNEY PROBLEMS:  - Ms. Reis has a documented history of kidney problems, which may be related to current hematuria and proteinuria findings.         Proteinuria, unspecified type  -     Protein,Total,24hr Urine w/o Creatinine; Future  -     Urinalysis, Reflex to Urine Culture Urine, Clean Catch  -      Comprehensive Metabolic Panel; Future; Expected date: 08/07/2025      No follow-ups on file.        8/7/2025 Sejal Guerrero NP    This note was generated with the assistance of ambient listening technology. Verbal consent was obtained by the patient and accompanying visitor(s) for the recording of patient appointment to facilitate this note. I attest to having reviewed and edited the generated note for accuracy, though some syntax or spelling errors may persist. Please contact the author of this note for any clarification.             [1]   Current Outpatient Medications:     cetirizine (ZYRTEC) 10 MG tablet, Take 1 tablet (10 mg total) by mouth once daily., Disp: 30 tablet, Rfl: 11    ergocalciferol (VITAMIN D2) 50,000 unit Cap, Take 1 capsule (50,000 Units total) by mouth every 7 days., Disp: 4 capsule, Rfl: 5    lactulose (KRISTALOSE) 10 gram packet, Take 1 packet (10 g total) by mouth once daily., Disp: 30 packet, Rfl: 5    PNV-DHA 27 mg iron-1 mg -300 mg Cap, TAKE one CAPSULE BY MOUTH ONCE DAILY, Disp: 30 capsule, Rfl: 5    tirzepatide, weight loss, (ZEPBOUND) 7.5 mg/0.5 mL PnIj, Inject 7.5 mg into the skin every 7 days., Disp: 2 mL, Rfl: 0    tranexamic acid (LYSTEDA) 650 mg tablet, Take 2 tablets (1,300 mg total) by mouth 3 (three) times daily. Take for up to 5 days during your period (you will use up to 30 tablets per menstrual cycle)., Disp: 540 tablet, Rfl: 3    venlafaxine (EFFEXOR-XR) 37.5 MG 24 hr capsule, Take 37.5 mg by mouth., Disp: , Rfl:     [START ON 9/1/2025] tirzepatide, weight loss, (ZEPBOUND) 10 mg/0.5 mL PnIj, Inject 10 mg into the skin every 7 days. (Patient not taking: Reported on 8/7/2025), Disp: 2 mL, Rfl: 3

## 2025-08-11 ENCOUNTER — OFFICE VISIT (OUTPATIENT)
Dept: PSYCHIATRY | Facility: CLINIC | Age: 40
End: 2025-08-11
Payer: MEDICAID

## 2025-08-11 ENCOUNTER — LAB VISIT (OUTPATIENT)
Dept: LAB | Facility: HOSPITAL | Age: 40
End: 2025-08-11
Attending: NURSE PRACTITIONER
Payer: COMMERCIAL

## 2025-08-11 ENCOUNTER — TELEPHONE (OUTPATIENT)
Dept: PSYCHIATRY | Facility: CLINIC | Age: 40
End: 2025-08-11
Payer: COMMERCIAL

## 2025-08-11 DIAGNOSIS — R80.9 PROTEINURIA, UNSPECIFIED TYPE: ICD-10-CM

## 2025-08-11 DIAGNOSIS — F43.10 POST TRAUMATIC STRESS DISORDER (PTSD): Primary | ICD-10-CM

## 2025-08-11 DIAGNOSIS — F41.9 ANXIETY: ICD-10-CM

## 2025-08-11 LAB
ALBUMIN SERPL-MCNC: 5 G/DL (ref 3.5–5.2)
ALP SERPL-CCNC: 42 UNIT/L (ref 55–135)
ALT SERPL-CCNC: 9 UNIT/L (ref 10–44)
ANION GAP (SMH): 5 MMOL/L (ref 8–16)
AST SERPL-CCNC: 13 UNIT/L (ref 10–40)
BILIRUB SERPL-MCNC: 0.7 MG/DL (ref 0.1–1)
BUN SERPL-MCNC: 15 MG/DL (ref 6–20)
CALCIUM SERPL-MCNC: 9.2 MG/DL (ref 8.7–10.5)
CHLORIDE SERPL-SCNC: 107 MMOL/L (ref 95–110)
CO2 SERPL-SCNC: 27 MMOL/L (ref 23–29)
CREAT SERPL-MCNC: 0.7 MG/DL (ref 0.5–1.4)
GFR SERPLBLD CREATININE-BSD FMLA CKD-EPI: >60 ML/MIN/1.73/M2
GLUCOSE SERPL-MCNC: 90 MG/DL (ref 70–110)
POTASSIUM SERPL-SCNC: 4.3 MMOL/L (ref 3.5–5.1)
PROT SERPL-MCNC: 7.9 GM/DL (ref 6–8.4)
SODIUM SERPL-SCNC: 139 MMOL/L (ref 136–145)

## 2025-08-11 PROCEDURE — 36415 COLL VENOUS BLD VENIPUNCTURE: CPT

## 2025-08-11 PROCEDURE — 84075 ASSAY ALKALINE PHOSPHATASE: CPT

## 2025-08-11 PROCEDURE — 1160F RVW MEDS BY RX/DR IN RCRD: CPT | Mod: CPTII,95,, | Performed by: PSYCHOLOGIST

## 2025-08-11 PROCEDURE — 1159F MED LIST DOCD IN RCRD: CPT | Mod: CPTII,95,, | Performed by: PSYCHOLOGIST

## 2025-08-11 PROCEDURE — 90791 PSYCH DIAGNOSTIC EVALUATION: CPT | Mod: AF,HB,95, | Performed by: PSYCHOLOGIST

## 2025-08-14 ENCOUNTER — CLINICAL SUPPORT (OUTPATIENT)
Dept: REHABILITATION | Facility: HOSPITAL | Age: 40
End: 2025-08-14
Payer: COMMERCIAL

## 2025-08-14 DIAGNOSIS — M62.89 PELVIC FLOOR DYSFUNCTION: Primary | ICD-10-CM

## 2025-08-14 PROCEDURE — 97112 NEUROMUSCULAR REEDUCATION: CPT | Mod: PO

## 2025-08-14 PROCEDURE — 97530 THERAPEUTIC ACTIVITIES: CPT | Mod: PO

## 2025-08-21 ENCOUNTER — CLINICAL SUPPORT (OUTPATIENT)
Dept: REHABILITATION | Facility: HOSPITAL | Age: 40
End: 2025-08-21
Payer: COMMERCIAL

## 2025-08-21 DIAGNOSIS — M62.89 PELVIC FLOOR DYSFUNCTION: Primary | ICD-10-CM

## 2025-08-21 PROCEDURE — 97140 MANUAL THERAPY 1/> REGIONS: CPT | Mod: PO

## 2025-08-21 PROCEDURE — 97112 NEUROMUSCULAR REEDUCATION: CPT | Mod: PO

## 2025-08-26 ENCOUNTER — OFFICE VISIT (OUTPATIENT)
Dept: PSYCHIATRY | Facility: CLINIC | Age: 40
End: 2025-08-26
Payer: MEDICAID

## 2025-08-26 DIAGNOSIS — F43.10 POST TRAUMATIC STRESS DISORDER (PTSD): Primary | ICD-10-CM

## 2025-08-26 PROCEDURE — 90837 PSYTX W PT 60 MINUTES: CPT | Mod: AH,HB,95, | Performed by: PSYCHOLOGIST

## 2025-08-26 PROCEDURE — 1159F MED LIST DOCD IN RCRD: CPT | Mod: CPTII,95,, | Performed by: PSYCHOLOGIST

## 2025-08-26 PROCEDURE — 1160F RVW MEDS BY RX/DR IN RCRD: CPT | Mod: CPTII,95,, | Performed by: PSYCHOLOGIST

## 2025-08-28 ENCOUNTER — CLINICAL SUPPORT (OUTPATIENT)
Dept: REHABILITATION | Facility: HOSPITAL | Age: 40
End: 2025-08-28
Payer: COMMERCIAL

## 2025-08-28 DIAGNOSIS — M62.89 PELVIC FLOOR DYSFUNCTION: Primary | ICD-10-CM

## 2025-08-28 PROCEDURE — 97112 NEUROMUSCULAR REEDUCATION: CPT | Mod: PO

## 2025-08-28 PROCEDURE — 97530 THERAPEUTIC ACTIVITIES: CPT | Mod: PO

## 2025-08-30 PROCEDURE — 84156 ASSAY OF PROTEIN URINE: CPT | Performed by: NURSE PRACTITIONER

## 2025-09-04 ENCOUNTER — CLINICAL SUPPORT (OUTPATIENT)
Dept: REHABILITATION | Facility: HOSPITAL | Age: 40
End: 2025-09-04
Payer: COMMERCIAL

## 2025-09-04 ENCOUNTER — HOSPITAL ENCOUNTER (OUTPATIENT)
Dept: RADIOLOGY | Facility: HOSPITAL | Age: 40
Discharge: HOME | End: 2025-09-04
Attending: FAMILY MEDICINE
Payer: COMMERCIAL

## 2025-09-04 DIAGNOSIS — R92.8 ABNORMAL MAMMOGRAM: ICD-10-CM

## 2025-09-04 DIAGNOSIS — M62.89 PELVIC FLOOR DYSFUNCTION: Primary | ICD-10-CM

## 2025-09-04 PROCEDURE — 77065 DX MAMMO INCL CAD UNI: CPT | Mod: 26,RT,, | Performed by: RADIOLOGY

## 2025-09-04 PROCEDURE — 97530 THERAPEUTIC ACTIVITIES: CPT | Mod: PO

## 2025-09-04 PROCEDURE — 77065 DX MAMMO INCL CAD UNI: CPT | Mod: TC,PO,RT

## 2025-09-04 PROCEDURE — 77061 BREAST TOMOSYNTHESIS UNI: CPT | Mod: 26,RT,, | Performed by: RADIOLOGY

## 2025-09-04 PROCEDURE — 97112 NEUROMUSCULAR REEDUCATION: CPT | Mod: PO
